# Patient Record
Sex: FEMALE | Race: OTHER | Employment: UNEMPLOYED | URBAN - METROPOLITAN AREA
[De-identification: names, ages, dates, MRNs, and addresses within clinical notes are randomized per-mention and may not be internally consistent; named-entity substitution may affect disease eponyms.]

---

## 2017-01-06 ENCOUNTER — CARE COORDINATION (OUTPATIENT)
Dept: CASE MANAGEMENT | Age: 72
End: 2017-01-06

## 2017-01-13 ENCOUNTER — CARE COORDINATION (OUTPATIENT)
Dept: CASE MANAGEMENT | Age: 72
End: 2017-01-13

## 2017-02-07 ENCOUNTER — HOSPITAL ENCOUNTER (OUTPATIENT)
Dept: CT IMAGING | Age: 72
Discharge: HOME | End: 2017-02-07
Payer: MEDICARE

## 2017-02-07 VITALS
SYSTOLIC BLOOD PRESSURE: 122 MMHG | WEIGHT: 233 LBS | BODY MASS INDEX: 35.31 KG/M2 | RESPIRATION RATE: 16 BRPM | HEART RATE: 68 BPM | HEIGHT: 68 IN | DIASTOLIC BLOOD PRESSURE: 73 MMHG

## 2017-02-07 DIAGNOSIS — C34.90 MALIGNANT NEOPLASM OF LUNG, UNSPECIFIED LATERALITY, UNSPECIFIED PART OF LUNG (HCC): ICD-10-CM

## 2017-02-07 PROCEDURE — 71275 CT ANGIOGRAPHY CHEST: CPT

## 2017-02-07 PROCEDURE — 6360000004 HC RX CONTRAST MEDICATION: Performed by: RADIOLOGY

## 2017-02-07 RX ADMIN — IOPAMIDOL 100 ML: 755 INJECTION, SOLUTION INTRAVENOUS at 12:35

## 2017-02-08 LAB
GFR AFRICAN AMERICAN: 59
GFR NON-AFRICAN AMERICAN: 49
PERFORMED ON: ABNORMAL
POC CREATININE: 1.1 MG/DL (ref 0.6–1.2)
POC SAMPLE TYPE: ABNORMAL

## 2017-04-21 ENCOUNTER — HOSPITAL ENCOUNTER (OUTPATIENT)
Dept: INFUSION THERAPY | Age: 72
Setting detail: INFUSION SERIES
Discharge: HOME | End: 2017-04-21
Payer: MEDICARE

## 2017-04-21 VITALS
SYSTOLIC BLOOD PRESSURE: 144 MMHG | RESPIRATION RATE: 18 BRPM | DIASTOLIC BLOOD PRESSURE: 67 MMHG | HEART RATE: 67 BPM | TEMPERATURE: 98.7 F

## 2017-04-21 PROCEDURE — 6360000002 HC RX W HCPCS: Performed by: INTERNAL MEDICINE

## 2017-04-21 PROCEDURE — 2580000003 HC RX 258: Performed by: INTERNAL MEDICINE

## 2017-04-21 PROCEDURE — 96413 CHEMO IV INFUSION 1 HR: CPT

## 2017-04-21 PROCEDURE — 96417 CHEMO IV INFUS EACH ADDL SEQ: CPT

## 2017-04-21 RX ORDER — SODIUM CHLORIDE 9 MG/ML
INJECTION, SOLUTION INTRAVENOUS CONTINUOUS
Status: DISCONTINUED | OUTPATIENT
Start: 2017-04-21 | End: 2017-04-22 | Stop reason: HOSPADM

## 2017-04-21 RX ORDER — SODIUM CHLORIDE 9 MG/ML
INJECTION, SOLUTION INTRAVENOUS
Status: DISCONTINUED
Start: 2017-04-21 | End: 2017-04-22 | Stop reason: HOSPADM

## 2017-04-21 RX ORDER — SODIUM CHLORIDE 0.9 % (FLUSH) 0.9 %
10 SYRINGE (ML) INJECTION 2 TIMES DAILY
Status: DISCONTINUED | OUTPATIENT
Start: 2017-04-21 | End: 2017-04-22 | Stop reason: HOSPADM

## 2017-04-21 RX ADMIN — SODIUM CHLORIDE: 9 INJECTION, SOLUTION INTRAVENOUS at 13:23

## 2017-04-21 RX ADMIN — BEVACIZUMAB: 400 INJECTION, SOLUTION INTRAVENOUS at 14:54

## 2017-04-27 ENCOUNTER — HOSPITAL ENCOUNTER (EMERGENCY)
Age: 72
Discharge: HOME | End: 2017-04-27
Payer: MEDICARE

## 2017-04-27 ENCOUNTER — APPOINTMENT (OUTPATIENT)
Dept: GENERAL RADIOLOGY | Age: 72
End: 2017-04-27
Payer: MEDICARE

## 2017-04-27 VITALS
DIASTOLIC BLOOD PRESSURE: 59 MMHG | BODY MASS INDEX: 36.83 KG/M2 | HEIGHT: 68 IN | OXYGEN SATURATION: 97 % | SYSTOLIC BLOOD PRESSURE: 121 MMHG | RESPIRATION RATE: 16 BRPM | WEIGHT: 243 LBS | HEART RATE: 69 BPM | TEMPERATURE: 97.4 F

## 2017-04-27 DIAGNOSIS — J02.9 ACUTE PHARYNGITIS, UNSPECIFIED ETIOLOGY: Primary | ICD-10-CM

## 2017-04-27 DIAGNOSIS — T50.905A MEDICATION REACTION, INITIAL ENCOUNTER: ICD-10-CM

## 2017-04-27 DIAGNOSIS — R73.9 HYPERGLYCEMIA: ICD-10-CM

## 2017-04-27 LAB
ALBUMIN SERPL-MCNC: 3.4 G/DL (ref 3.9–4.9)
ALP BLD-CCNC: 121 U/L (ref 40–130)
ALT SERPL-CCNC: 20 U/L (ref 0–33)
ANION GAP SERPL CALCULATED.3IONS-SCNC: 12 MEQ/L (ref 7–13)
APTT: 70.6 SEC (ref 21.6–35.4)
AST SERPL-CCNC: 24 U/L (ref 0–35)
BASOPHILS ABSOLUTE: 0 K/UL (ref 0–0.2)
BASOPHILS RELATIVE PERCENT: 0.4 %
BILIRUB SERPL-MCNC: 0.8 MG/DL (ref 0–1.2)
BILIRUBIN DIRECT: 0.2 MG/DL (ref 0–0.3)
BILIRUBIN, INDIRECT: 0.6 MG/DL (ref 0–0.6)
BUN BLDV-MCNC: 24 MG/DL (ref 8–23)
CALCIUM SERPL-MCNC: 9.1 MG/DL (ref 8.6–10.2)
CHLORIDE BLD-SCNC: 95 MEQ/L (ref 98–107)
CO2: 23 MEQ/L (ref 22–29)
CREAT SERPL-MCNC: 1.32 MG/DL (ref 0.5–0.9)
EOSINOPHILS ABSOLUTE: 0 K/UL (ref 0–0.7)
EOSINOPHILS RELATIVE PERCENT: 0.8 %
GFR AFRICAN AMERICAN: 48
GFR NON-AFRICAN AMERICAN: 39.7
GLUCOSE BLD-MCNC: 408 MG/DL (ref 74–109)
HCT VFR BLD CALC: 35.6 % (ref 37–47)
HEMOGLOBIN: 12.3 G/DL (ref 12–16)
INR BLD: 3
LYMPHOCYTES ABSOLUTE: 1.5 K/UL (ref 1–4.8)
LYMPHOCYTES RELATIVE PERCENT: 29.6 %
MCH RBC QN AUTO: 27.6 PG (ref 27–31.3)
MCHC RBC AUTO-ENTMCNC: 34.6 % (ref 33–37)
MCV RBC AUTO: 79.7 FL (ref 82–100)
MONOCYTES ABSOLUTE: 0.1 K/UL (ref 0.2–0.8)
MONOCYTES RELATIVE PERCENT: 2.5 %
NEUTROPHILS ABSOLUTE: 3.4 K/UL (ref 1.4–6.5)
NEUTROPHILS RELATIVE PERCENT: 66.7 %
PDW BLD-RTO: 15.4 % (ref 11.5–14.5)
PLATELET # BLD: 151 K/UL (ref 130–400)
POTASSIUM SERPL-SCNC: 4.2 MEQ/L (ref 3.5–5.1)
PROTHROMBIN TIME: 33.5 SEC (ref 8.1–13.7)
RBC # BLD: 4.47 M/UL (ref 4.2–5.4)
S PYO AG THROAT QL: NEGATIVE
SODIUM BLD-SCNC: 130 MEQ/L (ref 132–144)
TOTAL PROTEIN: 6.7 G/DL (ref 6.4–8.1)
WBC # BLD: 5.1 K/UL (ref 4.8–10.8)

## 2017-04-27 PROCEDURE — 6370000000 HC RX 637 (ALT 250 FOR IP): Performed by: PHYSICIAN ASSISTANT

## 2017-04-27 PROCEDURE — 99283 EMERGENCY DEPT VISIT LOW MDM: CPT

## 2017-04-27 PROCEDURE — 87081 CULTURE SCREEN ONLY: CPT

## 2017-04-27 PROCEDURE — 6360000002 HC RX W HCPCS: Performed by: PHYSICIAN ASSISTANT

## 2017-04-27 PROCEDURE — 96375 TX/PRO/DX INJ NEW DRUG ADDON: CPT

## 2017-04-27 PROCEDURE — 96372 THER/PROPH/DIAG INJ SC/IM: CPT

## 2017-04-27 PROCEDURE — 2580000003 HC RX 258: Performed by: PHYSICIAN ASSISTANT

## 2017-04-27 PROCEDURE — 85610 PROTHROMBIN TIME: CPT

## 2017-04-27 PROCEDURE — 36415 COLL VENOUS BLD VENIPUNCTURE: CPT

## 2017-04-27 PROCEDURE — 71010 XR CHEST PORTABLE: CPT

## 2017-04-27 PROCEDURE — 80076 HEPATIC FUNCTION PANEL: CPT

## 2017-04-27 PROCEDURE — 80048 BASIC METABOLIC PNL TOTAL CA: CPT

## 2017-04-27 PROCEDURE — 85025 COMPLETE CBC W/AUTO DIFF WBC: CPT

## 2017-04-27 PROCEDURE — 96374 THER/PROPH/DIAG INJ IV PUSH: CPT

## 2017-04-27 PROCEDURE — 87880 STREP A ASSAY W/OPTIC: CPT

## 2017-04-27 PROCEDURE — 85730 THROMBOPLASTIN TIME PARTIAL: CPT

## 2017-04-27 RX ORDER — 0.9 % SODIUM CHLORIDE 0.9 %
1000 INTRAVENOUS SOLUTION INTRAVENOUS ONCE
Status: COMPLETED | OUTPATIENT
Start: 2017-04-27 | End: 2017-04-27

## 2017-04-27 RX ORDER — HYDROCODONE BITARTRATE AND ACETAMINOPHEN 5; 217 MG/10ML; MG/10ML
10 SOLUTION ORAL EVERY 6 HOURS PRN
Qty: 120 ML | Refills: 0 | Status: SHIPPED | OUTPATIENT
Start: 2017-04-27 | End: 2017-04-30

## 2017-04-27 RX ORDER — MORPHINE SULFATE 4 MG/ML
4 INJECTION, SOLUTION INTRAMUSCULAR; INTRAVENOUS ONCE
Status: COMPLETED | OUTPATIENT
Start: 2017-04-27 | End: 2017-04-27

## 2017-04-27 RX ORDER — ONDANSETRON 2 MG/ML
4 INJECTION INTRAMUSCULAR; INTRAVENOUS ONCE
Status: COMPLETED | OUTPATIENT
Start: 2017-04-27 | End: 2017-04-27

## 2017-04-27 RX ADMIN — SODIUM CHLORIDE 1000 ML: 9 INJECTION, SOLUTION INTRAVENOUS at 15:43

## 2017-04-27 RX ADMIN — MORPHINE SULFATE 4 MG: 4 INJECTION, SOLUTION INTRAMUSCULAR; INTRAVENOUS at 15:47

## 2017-04-27 RX ADMIN — Medication 30 ML: at 14:38

## 2017-04-27 RX ADMIN — INSULIN HUMAN 8 UNITS: 100 INJECTION, SOLUTION PARENTERAL at 15:47

## 2017-04-27 RX ADMIN — ONDANSETRON 4 MG: 2 INJECTION, SOLUTION INTRAMUSCULAR; INTRAVENOUS at 15:45

## 2017-04-27 ASSESSMENT — PAIN SCALES - GENERAL
PAINLEVEL_OUTOF10: 0
PAINLEVEL_OUTOF10: 7
PAINLEVEL_OUTOF10: 2

## 2017-04-27 ASSESSMENT — PAIN DESCRIPTION - LOCATION: LOCATION: THROAT

## 2017-04-28 LAB — S PYO THROAT QL CULT: NORMAL

## 2017-05-12 ENCOUNTER — HOSPITAL ENCOUNTER (OUTPATIENT)
Dept: INFUSION THERAPY | Age: 72
Setting detail: INFUSION SERIES
Discharge: HOME | End: 2017-05-12
Payer: MEDICARE

## 2017-05-12 VITALS
RESPIRATION RATE: 16 BRPM | DIASTOLIC BLOOD PRESSURE: 70 MMHG | WEIGHT: 235 LBS | BODY MASS INDEX: 35.73 KG/M2 | TEMPERATURE: 97.8 F | HEART RATE: 74 BPM | SYSTOLIC BLOOD PRESSURE: 141 MMHG

## 2017-05-12 PROCEDURE — 96413 CHEMO IV INFUSION 1 HR: CPT

## 2017-05-12 PROCEDURE — 2580000003 HC RX 258

## 2017-05-12 PROCEDURE — 96375 TX/PRO/DX INJ NEW DRUG ADDON: CPT

## 2017-05-12 PROCEDURE — 2580000003 HC RX 258: Performed by: INTERNAL MEDICINE

## 2017-05-12 PROCEDURE — 6360000002 HC RX W HCPCS: Performed by: INTERNAL MEDICINE

## 2017-05-12 PROCEDURE — 96411 CHEMO IV PUSH ADDL DRUG: CPT

## 2017-05-12 PROCEDURE — 96417 CHEMO IV INFUS EACH ADDL SEQ: CPT

## 2017-05-12 RX ORDER — SODIUM CHLORIDE 9 MG/ML
INJECTION, SOLUTION INTRAVENOUS
Status: COMPLETED
Start: 2017-05-12 | End: 2017-05-12

## 2017-05-12 RX ADMIN — SODIUM CHLORIDE: 9 INJECTION, SOLUTION INTRAVENOUS at 14:00

## 2017-05-12 RX ADMIN — SODIUM CHLORIDE 250 ML: 9 INJECTION, SOLUTION INTRAVENOUS at 12:45

## 2017-05-12 RX ADMIN — DEXAMETHASONE SODIUM PHOSPHATE: 4 INJECTION, SOLUTION INTRA-ARTICULAR; INTRALESIONAL; INTRAMUSCULAR; INTRAVENOUS; SOFT TISSUE at 13:40

## 2017-05-12 RX ADMIN — BEVACIZUMAB 765 MG: 400 INJECTION, SOLUTION INTRAVENOUS at 12:50

## 2017-05-20 ENCOUNTER — HOSPITAL ENCOUNTER (INPATIENT)
Age: 72
LOS: 2 days | Discharge: HOME | DRG: 813 | End: 2017-05-23
Attending: EMERGENCY MEDICINE | Admitting: HOSPITALIST
Payer: MEDICARE

## 2017-05-20 DIAGNOSIS — R73.9 HYPERGLYCEMIA: Primary | ICD-10-CM

## 2017-05-20 DIAGNOSIS — K92.2 LOWER GI BLEED: ICD-10-CM

## 2017-05-20 LAB
ALBUMIN SERPL-MCNC: 3.3 G/DL (ref 3.9–4.9)
ALP BLD-CCNC: 125 U/L (ref 40–130)
ALT SERPL-CCNC: 17 U/L (ref 0–33)
ANION GAP SERPL CALCULATED.3IONS-SCNC: 13 MEQ/L (ref 7–13)
APTT: 61.8 SEC (ref 21.6–35.4)
AST SERPL-CCNC: 22 U/L (ref 0–35)
BILIRUB SERPL-MCNC: 0.4 MG/DL (ref 0–1.2)
BUN BLDV-MCNC: 16 MG/DL (ref 8–23)
CALCIUM SERPL-MCNC: 8.1 MG/DL (ref 8.6–10.2)
CHLORIDE BLD-SCNC: 91 MEQ/L (ref 98–107)
CK MB: 2.6 NG/ML (ref 0–3.8)
CO2: 20 MEQ/L (ref 22–29)
CREAT SERPL-MCNC: 1.07 MG/DL (ref 0.5–0.9)
CREATINE KINASE-MB INDEX: 4.1 % (ref 0–3.5)
GFR AFRICAN AMERICAN: >60
GFR NON-AFRICAN AMERICAN: 50.5
GLOBULIN: 2.9 G/DL (ref 2.3–3.5)
GLUCOSE BLD-MCNC: 652 MG/DL (ref 74–109)
INR BLD: 2.1
POTASSIUM SERPL-SCNC: 4.2 MEQ/L (ref 3.5–5.1)
PROTHROMBIN TIME: 22.7 SEC (ref 8.1–13.7)
SODIUM BLD-SCNC: 124 MEQ/L (ref 132–144)
TOTAL CK: 64 U/L (ref 0–170)
TOTAL PROTEIN: 6.2 G/DL (ref 6.4–8.1)
TROPONIN: <0.01 NG/ML (ref 0–0.01)

## 2017-05-20 PROCEDURE — 84484 ASSAY OF TROPONIN QUANT: CPT

## 2017-05-20 PROCEDURE — 85025 COMPLETE CBC W/AUTO DIFF WBC: CPT

## 2017-05-20 PROCEDURE — 82550 ASSAY OF CK (CPK): CPT

## 2017-05-20 PROCEDURE — 80053 COMPREHEN METABOLIC PANEL: CPT

## 2017-05-20 PROCEDURE — 99285 EMERGENCY DEPT VISIT HI MDM: CPT

## 2017-05-20 PROCEDURE — 82553 CREATINE MB FRACTION: CPT

## 2017-05-20 PROCEDURE — 96374 THER/PROPH/DIAG INJ IV PUSH: CPT

## 2017-05-20 PROCEDURE — 93005 ELECTROCARDIOGRAM TRACING: CPT

## 2017-05-20 PROCEDURE — 85730 THROMBOPLASTIN TIME PARTIAL: CPT

## 2017-05-20 PROCEDURE — 36415 COLL VENOUS BLD VENIPUNCTURE: CPT

## 2017-05-20 PROCEDURE — 85610 PROTHROMBIN TIME: CPT

## 2017-05-20 PROCEDURE — 96375 TX/PRO/DX INJ NEW DRUG ADDON: CPT

## 2017-05-20 ASSESSMENT — PAIN DESCRIPTION - PAIN TYPE: TYPE: ACUTE PAIN

## 2017-05-20 ASSESSMENT — PAIN DESCRIPTION - FREQUENCY: FREQUENCY: CONTINUOUS

## 2017-05-20 ASSESSMENT — PAIN SCALES - GENERAL: PAINLEVEL_OUTOF10: 8

## 2017-05-20 ASSESSMENT — PAIN DESCRIPTION - DESCRIPTORS: DESCRIPTORS: BURNING

## 2017-05-20 ASSESSMENT — PAIN DESCRIPTION - LOCATION: LOCATION: CHEST

## 2017-05-21 PROBLEM — E87.1 HYPONATREMIA: Status: ACTIVE | Noted: 2017-05-21

## 2017-05-21 PROBLEM — D72.819 LEUKOPENIA: Status: ACTIVE | Noted: 2017-05-21

## 2017-05-21 PROBLEM — K92.2 GI BLEED: Status: ACTIVE | Noted: 2017-05-21

## 2017-05-21 PROBLEM — R73.9 HYPERGLYCEMIA: Status: ACTIVE | Noted: 2017-05-21

## 2017-05-21 LAB
ANION GAP SERPL CALCULATED.3IONS-SCNC: 13 MEQ/L (ref 7–13)
ANISOCYTOSIS: ABNORMAL
BACTERIA: ABNORMAL /HPF
BASOPHILS ABSOLUTE: 0 K/UL (ref 0–0.2)
BASOPHILS ABSOLUTE: 0 K/UL (ref 0–0.2)
BASOPHILS RELATIVE PERCENT: 0.5 %
BASOPHILS RELATIVE PERCENT: 1 %
BILIRUBIN URINE: NEGATIVE
BLOOD, URINE: ABNORMAL
BUN BLDV-MCNC: 13 MG/DL (ref 8–23)
C-REACTIVE PROTEIN, HIGH SENSITIVITY: 34.3 MG/L (ref 0–5)
CALCIUM SERPL-MCNC: 7.9 MG/DL (ref 8.6–10.2)
CHLORIDE BLD-SCNC: 101 MEQ/L (ref 98–107)
CK MB: 1.7 NG/ML (ref 0–3.8)
CK MB: 1.8 NG/ML (ref 0–3.8)
CLARITY: ABNORMAL
CO2: 20 MEQ/L (ref 22–29)
COLOR: YELLOW
CREAT SERPL-MCNC: 0.89 MG/DL (ref 0.5–0.9)
CREATINE KINASE-MB INDEX: 3.1 % (ref 0–3.5)
CREATINE KINASE-MB INDEX: 3.2 % (ref 0–3.5)
EOSINOPHILS ABSOLUTE: 0 K/UL (ref 0–0.7)
EOSINOPHILS ABSOLUTE: 0 K/UL (ref 0–0.7)
EOSINOPHILS RELATIVE PERCENT: 1.3 %
EOSINOPHILS RELATIVE PERCENT: 1.3 %
EPITHELIAL CELLS, UA: ABNORMAL /HPF
GFR AFRICAN AMERICAN: >60
GFR NON-AFRICAN AMERICAN: >60
GLUCOSE BLD-MCNC: 137 MG/DL (ref 60–115)
GLUCOSE BLD-MCNC: 212 MG/DL (ref 60–115)
GLUCOSE BLD-MCNC: 229 MG/DL (ref 60–115)
GLUCOSE BLD-MCNC: 254 MG/DL (ref 60–115)
GLUCOSE BLD-MCNC: 290 MG/DL (ref 74–109)
GLUCOSE BLD-MCNC: 421 MG/DL (ref 60–115)
GLUCOSE URINE: >=1000 MG/DL
HBA1C MFR BLD: 11.4 % (ref 4.8–5.9)
HCT VFR BLD CALC: 29.6 % (ref 37–47)
HCT VFR BLD CALC: 33.8 % (ref 37–47)
HEMOGLOBIN: 10 G/DL (ref 12–16)
HEMOGLOBIN: 11 G/DL (ref 12–16)
HYPOCHROMIA: 0
INR BLD: 2.5
KETONES, URINE: NEGATIVE MG/DL
LEUKOCYTE ESTERASE, URINE: NEGATIVE
LYMPHOCYTES ABSOLUTE: 1.1 K/UL (ref 1–4.8)
LYMPHOCYTES ABSOLUTE: 2.1 K/UL (ref 1–4.8)
LYMPHOCYTES RELATIVE PERCENT: 36.8 %
LYMPHOCYTES RELATIVE PERCENT: 58 %
MACROCYTES: 0
MAGNESIUM: 1.8 MG/DL (ref 1.7–2.3)
MCH RBC QN AUTO: 27.6 PG (ref 27–31.3)
MCH RBC QN AUTO: 27.7 PG (ref 27–31.3)
MCHC RBC AUTO-ENTMCNC: 32.5 % (ref 33–37)
MCHC RBC AUTO-ENTMCNC: 33.8 % (ref 33–37)
MCV RBC AUTO: 82.2 FL (ref 82–100)
MCV RBC AUTO: 84.9 FL (ref 82–100)
MICROCYTES: ABNORMAL
MONOCYTES ABSOLUTE: 0.5 K/UL (ref 0.2–0.8)
MONOCYTES ABSOLUTE: 0.8 K/UL (ref 0.2–0.8)
MONOCYTES RELATIVE PERCENT: 13.4 %
MONOCYTES RELATIVE PERCENT: 25.2 %
MYELOCYTE PERCENT: 1 %
NEUTROPHILS ABSOLUTE: 1 K/UL (ref 1.4–6.5)
NEUTROPHILS ABSOLUTE: 1.1 K/UL (ref 1.4–6.5)
NEUTROPHILS RELATIVE PERCENT: 27 %
NEUTROPHILS RELATIVE PERCENT: 36.2 %
NITRITE, URINE: NEGATIVE
NUCLEATED RED BLOOD CELLS: 1 /100 WBC
PDW BLD-RTO: 16.4 % (ref 11.5–14.5)
PDW BLD-RTO: 16.7 % (ref 11.5–14.5)
PERFORMED ON: ABNORMAL
PH UA: 5 (ref 5–9)
PLATELET # BLD: 131 K/UL (ref 130–400)
PLATELET # BLD: 143 K/UL (ref 130–400)
PLATELET SLIDE REVIEW: NORMAL
POIKILOCYTES: 0
POLYCHROMASIA: 0
POTASSIUM SERPL-SCNC: 4.1 MEQ/L (ref 3.5–5.1)
PROTEIN UA: 100 MG/DL
PROTHROMBIN TIME: 27.5 SEC (ref 8.1–13.7)
RBC # BLD: 3.61 M/UL (ref 4.2–5.4)
RBC # BLD: 3.97 M/UL (ref 4.2–5.4)
RBC UA: ABNORMAL /HPF (ref 0–2)
SEDIMENTATION RATE, ERYTHROCYTE: 114 MM (ref 0–30)
SLIDE REVIEW: ABNORMAL
SODIUM BLD-SCNC: 134 MEQ/L (ref 132–144)
SPECIFIC GRAVITY UA: 1.02 (ref 1–1.03)
TOTAL CK: 55 U/L (ref 0–170)
TOTAL CK: 57 U/L (ref 0–170)
TROPONIN: <0.01 NG/ML (ref 0–0.01)
TROPONIN: <0.01 NG/ML (ref 0–0.01)
UROBILINOGEN, URINE: 0.2 E.U./DL
WBC # BLD: 3 K/UL (ref 4.8–10.8)
WBC # BLD: 3.7 K/UL (ref 4.8–10.8)
WBC UA: ABNORMAL /HPF (ref 0–5)

## 2017-05-21 PROCEDURE — 84484 ASSAY OF TROPONIN QUANT: CPT

## 2017-05-21 PROCEDURE — 6360000002 HC RX W HCPCS: Performed by: INTERNAL MEDICINE

## 2017-05-21 PROCEDURE — 82553 CREATINE MB FRACTION: CPT

## 2017-05-21 PROCEDURE — 36415 COLL VENOUS BLD VENIPUNCTURE: CPT

## 2017-05-21 PROCEDURE — 2580000003 HC RX 258: Performed by: EMERGENCY MEDICINE

## 2017-05-21 PROCEDURE — 81001 URINALYSIS AUTO W/SCOPE: CPT

## 2017-05-21 PROCEDURE — G8978 MOBILITY CURRENT STATUS: HCPCS

## 2017-05-21 PROCEDURE — 1210000000 HC MED SURG R&B

## 2017-05-21 PROCEDURE — 85610 PROTHROMBIN TIME: CPT

## 2017-05-21 PROCEDURE — 97162 PT EVAL MOD COMPLEX 30 MIN: CPT

## 2017-05-21 PROCEDURE — 86141 C-REACTIVE PROTEIN HS: CPT

## 2017-05-21 PROCEDURE — 80048 BASIC METABOLIC PNL TOTAL CA: CPT

## 2017-05-21 PROCEDURE — G8979 MOBILITY GOAL STATUS: HCPCS

## 2017-05-21 PROCEDURE — 82550 ASSAY OF CK (CPK): CPT

## 2017-05-21 PROCEDURE — 6360000002 HC RX W HCPCS: Performed by: EMERGENCY MEDICINE

## 2017-05-21 PROCEDURE — 6370000000 HC RX 637 (ALT 250 FOR IP): Performed by: HOSPITALIST

## 2017-05-21 PROCEDURE — 85652 RBC SED RATE AUTOMATED: CPT

## 2017-05-21 PROCEDURE — 2580000003 HC RX 258: Performed by: HOSPITALIST

## 2017-05-21 PROCEDURE — 85025 COMPLETE CBC W/AUTO DIFF WBC: CPT

## 2017-05-21 PROCEDURE — 6370000000 HC RX 637 (ALT 250 FOR IP): Performed by: INTERNAL MEDICINE

## 2017-05-21 PROCEDURE — 94664 DEMO&/EVAL PT USE INHALER: CPT

## 2017-05-21 PROCEDURE — 83735 ASSAY OF MAGNESIUM: CPT

## 2017-05-21 PROCEDURE — 83036 HEMOGLOBIN GLYCOSYLATED A1C: CPT

## 2017-05-21 PROCEDURE — 6370000000 HC RX 637 (ALT 250 FOR IP): Performed by: EMERGENCY MEDICINE

## 2017-05-21 RX ORDER — WARFARIN SODIUM 2.5 MG/1
2.5 TABLET ORAL
Status: DISCONTINUED | OUTPATIENT
Start: 2017-05-21 | End: 2017-05-21

## 2017-05-21 RX ORDER — SODIUM CHLORIDE 9 MG/ML
INJECTION, SOLUTION INTRAVENOUS CONTINUOUS
Status: DISCONTINUED | OUTPATIENT
Start: 2017-05-21 | End: 2017-05-23 | Stop reason: HOSPADM

## 2017-05-21 RX ORDER — DOCUSATE SODIUM 100 MG/1
100 CAPSULE, LIQUID FILLED ORAL 2 TIMES DAILY
Status: DISCONTINUED | OUTPATIENT
Start: 2017-05-21 | End: 2017-05-23 | Stop reason: HOSPADM

## 2017-05-21 RX ORDER — PRAVASTATIN SODIUM 40 MG
80 TABLET ORAL DAILY
Status: DISCONTINUED | OUTPATIENT
Start: 2017-05-21 | End: 2017-05-23 | Stop reason: HOSPADM

## 2017-05-21 RX ORDER — LEVOTHYROXINE SODIUM 0.15 MG/1
150 TABLET ORAL DAILY
Status: DISCONTINUED | OUTPATIENT
Start: 2017-05-21 | End: 2017-05-23 | Stop reason: HOSPADM

## 2017-05-21 RX ORDER — ONDANSETRON 2 MG/ML
4 INJECTION INTRAMUSCULAR; INTRAVENOUS EVERY 6 HOURS PRN
Status: DISCONTINUED | OUTPATIENT
Start: 2017-05-21 | End: 2017-05-23 | Stop reason: HOSPADM

## 2017-05-21 RX ORDER — SODIUM CHLORIDE 0.9 % (FLUSH) 0.9 %
10 SYRINGE (ML) INJECTION EVERY 12 HOURS SCHEDULED
Status: DISCONTINUED | OUTPATIENT
Start: 2017-05-21 | End: 2017-05-23 | Stop reason: HOSPADM

## 2017-05-21 RX ORDER — FOLIC ACID 1 MG/1
1 TABLET ORAL DAILY
Status: DISCONTINUED | OUTPATIENT
Start: 2017-05-21 | End: 2017-05-23 | Stop reason: HOSPADM

## 2017-05-21 RX ORDER — ONDANSETRON 2 MG/ML
4 INJECTION INTRAMUSCULAR; INTRAVENOUS ONCE
Status: COMPLETED | OUTPATIENT
Start: 2017-05-21 | End: 2017-05-21

## 2017-05-21 RX ORDER — DEXTROSE MONOHYDRATE 25 G/50ML
12.5 INJECTION, SOLUTION INTRAVENOUS PRN
Status: DISCONTINUED | OUTPATIENT
Start: 2017-05-21 | End: 2017-05-23 | Stop reason: HOSPADM

## 2017-05-21 RX ORDER — FAMOTIDINE 20 MG/1
40 TABLET, FILM COATED ORAL DAILY
Status: DISCONTINUED | OUTPATIENT
Start: 2017-05-21 | End: 2017-05-23 | Stop reason: HOSPADM

## 2017-05-21 RX ORDER — NITROGLYCERIN 0.4 MG/1
0.4 TABLET SUBLINGUAL EVERY 5 MIN PRN
Status: DISCONTINUED | OUTPATIENT
Start: 2017-05-21 | End: 2017-05-23 | Stop reason: HOSPADM

## 2017-05-21 RX ORDER — DEXTROSE MONOHYDRATE 50 MG/ML
100 INJECTION, SOLUTION INTRAVENOUS PRN
Status: DISCONTINUED | OUTPATIENT
Start: 2017-05-21 | End: 2017-05-23 | Stop reason: HOSPADM

## 2017-05-21 RX ORDER — DOXEPIN HYDROCHLORIDE 50 MG/1
200 CAPSULE ORAL NIGHTLY
Status: DISCONTINUED | OUTPATIENT
Start: 2017-05-21 | End: 2017-05-23 | Stop reason: HOSPADM

## 2017-05-21 RX ORDER — ASPIRIN 81 MG/1
81 TABLET ORAL DAILY
Status: DISCONTINUED | OUTPATIENT
Start: 2017-05-21 | End: 2017-05-21

## 2017-05-21 RX ORDER — LORAZEPAM 2 MG/ML
1 INJECTION INTRAMUSCULAR ONCE
Status: COMPLETED | OUTPATIENT
Start: 2017-05-21 | End: 2017-05-21

## 2017-05-21 RX ORDER — WARFARIN SODIUM 5 MG/1
5 TABLET ORAL
Status: DISCONTINUED | OUTPATIENT
Start: 2017-05-24 | End: 2017-05-21

## 2017-05-21 RX ORDER — HYDROCHLOROTHIAZIDE 25 MG/1
25 TABLET ORAL
Status: DISCONTINUED | OUTPATIENT
Start: 2017-05-22 | End: 2017-05-23 | Stop reason: HOSPADM

## 2017-05-21 RX ORDER — ALBUTEROL SULFATE 90 UG/1
2 AEROSOL, METERED RESPIRATORY (INHALATION) EVERY 6 HOURS PRN
Status: DISCONTINUED | OUTPATIENT
Start: 2017-05-21 | End: 2017-05-23 | Stop reason: HOSPADM

## 2017-05-21 RX ORDER — PRIMIDONE 50 MG/1
50 TABLET ORAL NIGHTLY
Status: DISCONTINUED | OUTPATIENT
Start: 2017-05-21 | End: 2017-05-23 | Stop reason: HOSPADM

## 2017-05-21 RX ORDER — SODIUM CHLORIDE 0.9 % (FLUSH) 0.9 %
10 SYRINGE (ML) INJECTION PRN
Status: DISCONTINUED | OUTPATIENT
Start: 2017-05-21 | End: 2017-05-23 | Stop reason: HOSPADM

## 2017-05-21 RX ORDER — ACETAMINOPHEN 325 MG/1
650 TABLET ORAL EVERY 4 HOURS PRN
Status: DISCONTINUED | OUTPATIENT
Start: 2017-05-21 | End: 2017-05-23 | Stop reason: HOSPADM

## 2017-05-21 RX ORDER — PANTOPRAZOLE SODIUM 40 MG/1
40 TABLET, DELAYED RELEASE ORAL
Status: DISCONTINUED | OUTPATIENT
Start: 2017-05-21 | End: 2017-05-23 | Stop reason: HOSPADM

## 2017-05-21 RX ORDER — 0.9 % SODIUM CHLORIDE 0.9 %
1000 INTRAVENOUS SOLUTION INTRAVENOUS ONCE
Status: COMPLETED | OUTPATIENT
Start: 2017-05-21 | End: 2017-05-21

## 2017-05-21 RX ORDER — MAGNESIUM HYDROXIDE/ALUMINUM HYDROXICE/SIMETHICONE 120; 1200; 1200 MG/30ML; MG/30ML; MG/30ML
30 SUSPENSION ORAL
Status: COMPLETED | OUTPATIENT
Start: 2017-05-21 | End: 2017-05-21

## 2017-05-21 RX ORDER — KETOROLAC TROMETHAMINE 30 MG/ML
30 INJECTION, SOLUTION INTRAMUSCULAR; INTRAVENOUS EVERY 8 HOURS PRN
Status: DISCONTINUED | OUTPATIENT
Start: 2017-05-21 | End: 2017-05-23 | Stop reason: HOSPADM

## 2017-05-21 RX ORDER — ISOSORBIDE MONONITRATE 30 MG/1
30 TABLET, EXTENDED RELEASE ORAL DAILY
Status: DISCONTINUED | OUTPATIENT
Start: 2017-05-21 | End: 2017-05-23 | Stop reason: HOSPADM

## 2017-05-21 RX ORDER — NICOTINE POLACRILEX 4 MG
15 LOZENGE BUCCAL PRN
Status: DISCONTINUED | OUTPATIENT
Start: 2017-05-21 | End: 2017-05-23 | Stop reason: HOSPADM

## 2017-05-21 RX ORDER — PROPRANOLOL HCL 60 MG
60 CAPSULE, EXTENDED RELEASE 24HR ORAL DAILY
Status: DISCONTINUED | OUTPATIENT
Start: 2017-05-21 | End: 2017-05-23 | Stop reason: HOSPADM

## 2017-05-21 RX ADMIN — LEVOTHYROXINE SODIUM 150 MCG: 150 TABLET ORAL at 06:28

## 2017-05-21 RX ADMIN — INSULIN HUMAN 90 UNITS: 100 INJECTION, SUSPENSION SUBCUTANEOUS at 09:17

## 2017-05-21 RX ADMIN — PRIMIDONE 50 MG: 50 TABLET ORAL at 22:33

## 2017-05-21 RX ADMIN — Medication 5 ML: at 00:31

## 2017-05-21 RX ADMIN — DOXEPIN HYDROCHLORIDE 200 MG: 50 CAPSULE ORAL at 23:37

## 2017-05-21 RX ADMIN — PRAVASTATIN SODIUM 80 MG: 40 TABLET ORAL at 09:16

## 2017-05-21 RX ADMIN — FAMOTIDINE 40 MG: 20 TABLET ORAL at 18:08

## 2017-05-21 RX ADMIN — ISOSORBIDE MONONITRATE 30 MG: 30 TABLET, EXTENDED RELEASE ORAL at 09:17

## 2017-05-21 RX ADMIN — ALUMINUM HYDROXIDE, MAGNESIUM HYDROXIDE, AND SIMETHICONE 30 ML: 200; 200; 20 SUSPENSION ORAL at 00:31

## 2017-05-21 RX ADMIN — SODIUM CHLORIDE: 900 INJECTION, SOLUTION INTRAVENOUS at 12:15

## 2017-05-21 RX ADMIN — SODIUM CHLORIDE 1000 ML: 900 INJECTION, SOLUTION INTRAVENOUS at 22:37

## 2017-05-21 RX ADMIN — SODIUM CHLORIDE 1000 ML: 9 INJECTION, SOLUTION INTRAVENOUS at 00:33

## 2017-05-21 RX ADMIN — PANTOPRAZOLE SODIUM 40 MG: 40 TABLET, DELAYED RELEASE ORAL at 12:15

## 2017-05-21 RX ADMIN — INSULIN HUMAN 10 UNITS: 100 INJECTION, SOLUTION PARENTERAL at 00:34

## 2017-05-21 RX ADMIN — Medication 800 MG: at 10:48

## 2017-05-21 RX ADMIN — INSULIN LISPRO 6 UNITS: 100 INJECTION, SOLUTION INTRAVENOUS; SUBCUTANEOUS at 09:18

## 2017-05-21 RX ADMIN — KETOROLAC TROMETHAMINE 30 MG: 30 INJECTION, SOLUTION INTRAMUSCULAR at 18:06

## 2017-05-21 RX ADMIN — ACETAMINOPHEN 650 MG: 325 TABLET ORAL at 17:25

## 2017-05-21 RX ADMIN — DOCUSATE SODIUM 100 MG: 100 CAPSULE ORAL at 22:33

## 2017-05-21 RX ADMIN — FOLIC ACID 1 MG: 1 TABLET ORAL at 09:17

## 2017-05-21 RX ADMIN — ONDANSETRON 4 MG: 2 INJECTION, SOLUTION INTRAMUSCULAR; INTRAVENOUS at 00:33

## 2017-05-21 RX ADMIN — LORAZEPAM 1 MG: 2 INJECTION INTRAMUSCULAR; INTRAVENOUS at 01:41

## 2017-05-21 RX ADMIN — DOCUSATE SODIUM 100 MG: 100 CAPSULE ORAL at 09:17

## 2017-05-21 RX ADMIN — SODIUM CHLORIDE: 900 INJECTION, SOLUTION INTRAVENOUS at 03:22

## 2017-05-21 RX ADMIN — INSULIN LISPRO 4 UNITS: 100 INJECTION, SOLUTION INTRAVENOUS; SUBCUTANEOUS at 12:15

## 2017-05-21 RX ADMIN — INSULIN HUMAN 90 UNITS: 100 INJECTION, SUSPENSION SUBCUTANEOUS at 17:25

## 2017-05-21 RX ADMIN — INSULIN LISPRO 4 UNITS: 100 INJECTION, SOLUTION INTRAVENOUS; SUBCUTANEOUS at 17:25

## 2017-05-21 ASSESSMENT — PAIN SCALES - GENERAL
PAINLEVEL_OUTOF10: 6
PAINLEVEL_OUTOF10: 0
PAINLEVEL_OUTOF10: 9

## 2017-05-21 ASSESSMENT — ENCOUNTER SYMPTOMS
ANAL BLEEDING: 1
BLOOD IN STOOL: 1

## 2017-05-22 LAB
ANION GAP SERPL CALCULATED.3IONS-SCNC: 13 MEQ/L (ref 7–13)
BASOPHILS ABSOLUTE: 0 K/UL (ref 0–0.2)
BASOPHILS RELATIVE PERCENT: 0.1 %
BUN BLDV-MCNC: 15 MG/DL (ref 8–23)
CALCIUM SERPL-MCNC: 7.9 MG/DL (ref 8.6–10.2)
CHLORIDE BLD-SCNC: 106 MEQ/L (ref 98–107)
CO2: 19 MEQ/L (ref 22–29)
CREAT SERPL-MCNC: 1.24 MG/DL (ref 0.5–0.9)
EOSINOPHILS ABSOLUTE: 0 K/UL (ref 0–0.7)
EOSINOPHILS RELATIVE PERCENT: 0.9 %
GFR AFRICAN AMERICAN: 51.6
GFR NON-AFRICAN AMERICAN: 42.6
GLUCOSE BLD-MCNC: 188 MG/DL (ref 60–115)
GLUCOSE BLD-MCNC: 215 MG/DL (ref 60–115)
GLUCOSE BLD-MCNC: 234 MG/DL (ref 60–115)
GLUCOSE BLD-MCNC: 56 MG/DL (ref 74–109)
GLUCOSE BLD-MCNC: 65 MG/DL (ref 60–115)
HCT VFR BLD CALC: 28.6 % (ref 37–47)
HEMOGLOBIN: 9.8 G/DL (ref 12–16)
INR BLD: 2.4
LYMPHOCYTES ABSOLUTE: 1.6 K/UL (ref 1–4.8)
LYMPHOCYTES RELATIVE PERCENT: 44 %
MAGNESIUM: 1.9 MG/DL (ref 1.7–2.3)
MCH RBC QN AUTO: 28 PG (ref 27–31.3)
MCHC RBC AUTO-ENTMCNC: 34.1 % (ref 33–37)
MCV RBC AUTO: 81.9 FL (ref 82–100)
MONOCYTES ABSOLUTE: 0.9 K/UL (ref 0.2–0.8)
MONOCYTES RELATIVE PERCENT: 26.4 %
NEUTROPHILS ABSOLUTE: 1 K/UL (ref 1.4–6.5)
NEUTROPHILS RELATIVE PERCENT: 28.6 %
PDW BLD-RTO: 16.7 % (ref 11.5–14.5)
PERFORMED ON: ABNORMAL
PERFORMED ON: NORMAL
PLATELET # BLD: 136 K/UL (ref 130–400)
POTASSIUM SERPL-SCNC: 3.8 MEQ/L (ref 3.5–5.1)
PROTHROMBIN TIME: 26.8 SEC (ref 8.1–13.7)
RBC # BLD: 3.49 M/UL (ref 4.2–5.4)
SLIDE REVIEW: ABNORMAL
SODIUM BLD-SCNC: 138 MEQ/L (ref 132–144)
T4 FREE: 1.3 NG/DL (ref 0.93–1.7)
TSH SERPL DL<=0.05 MIU/L-ACNC: 2.66 UIU/ML (ref 0.27–4.2)
WBC # BLD: 3.6 K/UL (ref 4.8–10.8)

## 2017-05-22 PROCEDURE — 84439 ASSAY OF FREE THYROXINE: CPT

## 2017-05-22 PROCEDURE — 85610 PROTHROMBIN TIME: CPT

## 2017-05-22 PROCEDURE — 6370000000 HC RX 637 (ALT 250 FOR IP): Performed by: INTERNAL MEDICINE

## 2017-05-22 PROCEDURE — 6370000000 HC RX 637 (ALT 250 FOR IP): Performed by: HOSPITALIST

## 2017-05-22 PROCEDURE — 1210000000 HC MED SURG R&B

## 2017-05-22 PROCEDURE — 80048 BASIC METABOLIC PNL TOTAL CA: CPT

## 2017-05-22 PROCEDURE — 2580000003 HC RX 258: Performed by: HOSPITALIST

## 2017-05-22 PROCEDURE — 84443 ASSAY THYROID STIM HORMONE: CPT

## 2017-05-22 PROCEDURE — 99222 1ST HOSP IP/OBS MODERATE 55: CPT | Performed by: INTERNAL MEDICINE

## 2017-05-22 PROCEDURE — 85025 COMPLETE CBC W/AUTO DIFF WBC: CPT

## 2017-05-22 PROCEDURE — 36415 COLL VENOUS BLD VENIPUNCTURE: CPT

## 2017-05-22 PROCEDURE — 6360000002 HC RX W HCPCS: Performed by: INTERNAL MEDICINE

## 2017-05-22 PROCEDURE — 83735 ASSAY OF MAGNESIUM: CPT

## 2017-05-22 PROCEDURE — 97116 GAIT TRAINING THERAPY: CPT

## 2017-05-22 RX ORDER — POLYETHYLENE GLYCOL 3350 17 G/17G
17 POWDER, FOR SOLUTION ORAL DAILY
Status: DISCONTINUED | OUTPATIENT
Start: 2017-05-22 | End: 2017-05-23 | Stop reason: HOSPADM

## 2017-05-22 RX ADMIN — HYDROCHLOROTHIAZIDE 25 MG: 25 TABLET ORAL at 08:44

## 2017-05-22 RX ADMIN — PANTOPRAZOLE SODIUM 40 MG: 40 TABLET, DELAYED RELEASE ORAL at 06:51

## 2017-05-22 RX ADMIN — PRAVASTATIN SODIUM 80 MG: 40 TABLET ORAL at 08:44

## 2017-05-22 RX ADMIN — INSULIN LISPRO 2 UNITS: 100 INJECTION, SOLUTION INTRAVENOUS; SUBCUTANEOUS at 12:25

## 2017-05-22 RX ADMIN — INSULIN LISPRO 2 UNITS: 100 INJECTION, SOLUTION INTRAVENOUS; SUBCUTANEOUS at 23:23

## 2017-05-22 RX ADMIN — PRIMIDONE 50 MG: 50 TABLET ORAL at 21:11

## 2017-05-22 RX ADMIN — SODIUM CHLORIDE: 900 INJECTION, SOLUTION INTRAVENOUS at 15:30

## 2017-05-22 RX ADMIN — PROPRANOLOL HYDROCHLORIDE 60 MG: 60 CAPSULE, EXTENDED RELEASE ORAL at 08:44

## 2017-05-22 RX ADMIN — SODIUM CHLORIDE: 900 INJECTION, SOLUTION INTRAVENOUS at 08:47

## 2017-05-22 RX ADMIN — SODIUM CHLORIDE: 900 INJECTION, SOLUTION INTRAVENOUS at 10:28

## 2017-05-22 RX ADMIN — INSULIN LISPRO 4 UNITS: 100 INJECTION, SOLUTION INTRAVENOUS; SUBCUTANEOUS at 18:27

## 2017-05-22 RX ADMIN — DOXEPIN HYDROCHLORIDE 200 MG: 50 CAPSULE ORAL at 21:11

## 2017-05-22 RX ADMIN — KETOROLAC TROMETHAMINE 30 MG: 30 INJECTION, SOLUTION INTRAMUSCULAR at 21:35

## 2017-05-22 RX ADMIN — ACETAMINOPHEN 650 MG: 325 TABLET ORAL at 21:11

## 2017-05-22 RX ADMIN — Medication 10 ML: at 09:00

## 2017-05-22 RX ADMIN — DOCUSATE SODIUM 100 MG: 100 CAPSULE ORAL at 21:11

## 2017-05-22 RX ADMIN — ISOSORBIDE MONONITRATE 30 MG: 30 TABLET, EXTENDED RELEASE ORAL at 08:45

## 2017-05-22 RX ADMIN — INSULIN LISPRO 40 UNITS: 100 INJECTION, SUSPENSION SUBCUTANEOUS at 18:30

## 2017-05-22 RX ADMIN — SODIUM CHLORIDE 1000 ML: 900 INJECTION, SOLUTION INTRAVENOUS at 23:41

## 2017-05-22 RX ADMIN — FAMOTIDINE 40 MG: 20 TABLET ORAL at 08:44

## 2017-05-22 RX ADMIN — DOCUSATE SODIUM 100 MG: 100 CAPSULE ORAL at 08:45

## 2017-05-22 RX ADMIN — ACETAMINOPHEN 650 MG: 325 TABLET ORAL at 15:11

## 2017-05-22 RX ADMIN — LEVOTHYROXINE SODIUM 150 MCG: 150 TABLET ORAL at 06:51

## 2017-05-22 RX ADMIN — FOLIC ACID 1 MG: 1 TABLET ORAL at 08:45

## 2017-05-22 ASSESSMENT — PAIN SCALES - GENERAL
PAINLEVEL_OUTOF10: 0
PAINLEVEL_OUTOF10: 5
PAINLEVEL_OUTOF10: 0
PAINLEVEL_OUTOF10: 10
PAINLEVEL_OUTOF10: 0
PAINLEVEL_OUTOF10: 0
PAINLEVEL_OUTOF10: 10
PAINLEVEL_OUTOF10: 0
PAINLEVEL_OUTOF10: 8
PAINLEVEL_OUTOF10: 0
PAINLEVEL_OUTOF10: 0

## 2017-05-22 ASSESSMENT — PAIN DESCRIPTION - PROGRESSION
CLINICAL_PROGRESSION: GRADUALLY IMPROVING

## 2017-05-22 ASSESSMENT — PAIN DESCRIPTION - PAIN TYPE: TYPE: ACUTE PAIN

## 2017-05-22 ASSESSMENT — PAIN DESCRIPTION - DESCRIPTORS: DESCRIPTORS: ACHING;DISCOMFORT

## 2017-05-23 VITALS
WEIGHT: 234.4 LBS | TEMPERATURE: 97.3 F | BODY MASS INDEX: 35.52 KG/M2 | RESPIRATION RATE: 18 BRPM | OXYGEN SATURATION: 100 % | HEART RATE: 80 BPM | SYSTOLIC BLOOD PRESSURE: 83 MMHG | DIASTOLIC BLOOD PRESSURE: 68 MMHG | HEIGHT: 68 IN

## 2017-05-23 LAB
ANION GAP SERPL CALCULATED.3IONS-SCNC: 11 MEQ/L (ref 7–13)
BASOPHILS ABSOLUTE: 0 K/UL (ref 0–0.2)
BASOPHILS RELATIVE PERCENT: 0.2 %
BUN BLDV-MCNC: 11 MG/DL (ref 8–23)
CALCIUM SERPL-MCNC: 8 MG/DL (ref 8.6–10.2)
CHLORIDE BLD-SCNC: 105 MEQ/L (ref 98–107)
CO2: 20 MEQ/L (ref 22–29)
CREAT SERPL-MCNC: 0.95 MG/DL (ref 0.5–0.9)
EOSINOPHILS ABSOLUTE: 0 K/UL (ref 0–0.7)
EOSINOPHILS RELATIVE PERCENT: 1.1 %
GFR AFRICAN AMERICAN: >60
GFR NON-AFRICAN AMERICAN: 57.9
GLUCOSE BLD-MCNC: 129 MG/DL (ref 74–109)
GLUCOSE BLD-MCNC: 133 MG/DL (ref 60–115)
GLUCOSE BLD-MCNC: 204 MG/DL (ref 60–115)
GLUCOSE BLD-MCNC: 98 MG/DL (ref 60–115)
HCT VFR BLD CALC: 27.8 % (ref 37–47)
HEMOGLOBIN: 9.5 G/DL (ref 12–16)
INR BLD: 1.9
LYMPHOCYTES ABSOLUTE: 1.4 K/UL (ref 1–4.8)
LYMPHOCYTES RELATIVE PERCENT: 41.8 %
MAGNESIUM: 1.9 MG/DL (ref 1.7–2.3)
MCH RBC QN AUTO: 28.4 PG (ref 27–31.3)
MCHC RBC AUTO-ENTMCNC: 34.1 % (ref 33–37)
MCV RBC AUTO: 83.2 FL (ref 82–100)
MONOCYTES ABSOLUTE: 0.9 K/UL (ref 0.2–0.8)
MONOCYTES RELATIVE PERCENT: 28.5 %
NEUTROPHILS ABSOLUTE: 0.9 K/UL (ref 1.4–6.5)
NEUTROPHILS RELATIVE PERCENT: 28.4 %
PDW BLD-RTO: 16.8 % (ref 11.5–14.5)
PERFORMED ON: ABNORMAL
PERFORMED ON: ABNORMAL
PERFORMED ON: NORMAL
PLATELET # BLD: 135 K/UL (ref 130–400)
POTASSIUM SERPL-SCNC: 3.6 MEQ/L (ref 3.5–5.1)
PROTHROMBIN TIME: 21.3 SEC (ref 8.1–13.7)
RBC # BLD: 3.34 M/UL (ref 4.2–5.4)
SODIUM BLD-SCNC: 136 MEQ/L (ref 132–144)
WBC # BLD: 3.3 K/UL (ref 4.8–10.8)

## 2017-05-23 PROCEDURE — 85610 PROTHROMBIN TIME: CPT

## 2017-05-23 PROCEDURE — 83735 ASSAY OF MAGNESIUM: CPT

## 2017-05-23 PROCEDURE — 2580000003 HC RX 258: Performed by: HOSPITALIST

## 2017-05-23 PROCEDURE — 6360000002 HC RX W HCPCS: Performed by: INTERNAL MEDICINE

## 2017-05-23 PROCEDURE — 6370000000 HC RX 637 (ALT 250 FOR IP): Performed by: INTERNAL MEDICINE

## 2017-05-23 PROCEDURE — 6370000000 HC RX 637 (ALT 250 FOR IP): Performed by: HOSPITALIST

## 2017-05-23 PROCEDURE — 85025 COMPLETE CBC W/AUTO DIFF WBC: CPT

## 2017-05-23 PROCEDURE — 80048 BASIC METABOLIC PNL TOTAL CA: CPT

## 2017-05-23 PROCEDURE — 36415 COLL VENOUS BLD VENIPUNCTURE: CPT

## 2017-05-23 PROCEDURE — 99232 SBSQ HOSP IP/OBS MODERATE 35: CPT | Performed by: INTERNAL MEDICINE

## 2017-05-23 RX ORDER — GUAIFENESIN/DEXTROMETHORPHAN 100-10MG/5
5 SYRUP ORAL EVERY 4 HOURS PRN
Status: DISCONTINUED | OUTPATIENT
Start: 2017-05-23 | End: 2017-05-23 | Stop reason: HOSPADM

## 2017-05-23 RX ADMIN — PANTOPRAZOLE SODIUM 40 MG: 40 TABLET, DELAYED RELEASE ORAL at 05:52

## 2017-05-23 RX ADMIN — GUAIFENESIN AND DEXTROMETHORPHAN 5 ML: 100; 10 SYRUP ORAL at 14:44

## 2017-05-23 RX ADMIN — LEVOTHYROXINE SODIUM 150 MCG: 150 TABLET ORAL at 05:52

## 2017-05-23 RX ADMIN — ISOSORBIDE MONONITRATE 30 MG: 30 TABLET, EXTENDED RELEASE ORAL at 10:10

## 2017-05-23 RX ADMIN — PROPRANOLOL HYDROCHLORIDE 60 MG: 60 CAPSULE, EXTENDED RELEASE ORAL at 10:10

## 2017-05-23 RX ADMIN — PRAVASTATIN SODIUM 80 MG: 40 TABLET ORAL at 10:09

## 2017-05-23 RX ADMIN — FAMOTIDINE 40 MG: 20 TABLET ORAL at 10:10

## 2017-05-23 RX ADMIN — KETOROLAC TROMETHAMINE 30 MG: 30 INJECTION, SOLUTION INTRAMUSCULAR at 14:30

## 2017-05-23 RX ADMIN — ACETAMINOPHEN 650 MG: 325 TABLET ORAL at 18:11

## 2017-05-23 RX ADMIN — INSULIN LISPRO 4 UNITS: 100 INJECTION, SOLUTION INTRAVENOUS; SUBCUTANEOUS at 14:31

## 2017-05-23 RX ADMIN — POLYETHYLENE GLYCOL 3350 17 G: 17 POWDER, FOR SOLUTION ORAL at 10:09

## 2017-05-23 RX ADMIN — INSULIN LISPRO 40 UNITS: 100 INJECTION, SUSPENSION SUBCUTANEOUS at 10:12

## 2017-05-23 RX ADMIN — SODIUM CHLORIDE: 900 INJECTION, SOLUTION INTRAVENOUS at 05:55

## 2017-05-23 RX ADMIN — DOCUSATE SODIUM 100 MG: 100 CAPSULE ORAL at 10:10

## 2017-05-23 RX ADMIN — FOLIC ACID 1 MG: 1 TABLET ORAL at 10:10

## 2017-05-23 ASSESSMENT — PAIN SCALES - GENERAL
PAINLEVEL_OUTOF10: 10
PAINLEVEL_OUTOF10: 9

## 2017-05-23 ASSESSMENT — PAIN DESCRIPTION - PROGRESSION
CLINICAL_PROGRESSION: GRADUALLY IMPROVING

## 2017-05-24 ENCOUNTER — OFFICE VISIT (OUTPATIENT)
Dept: SURGERY | Age: 72
End: 2017-05-24

## 2017-05-24 VITALS — DIASTOLIC BLOOD PRESSURE: 75 MMHG | SYSTOLIC BLOOD PRESSURE: 132 MMHG | HEART RATE: 72 BPM

## 2017-05-24 LAB — GLUCOSE BLD-MCNC: 248 MG/DL

## 2017-05-24 PROCEDURE — 82962 GLUCOSE BLOOD TEST: CPT | Performed by: INTERNAL MEDICINE

## 2017-05-24 PROCEDURE — 99213 OFFICE O/P EST LOW 20 MIN: CPT | Performed by: INTERNAL MEDICINE

## 2017-05-26 LAB
EKG ATRIAL RATE: 80 BPM
EKG P AXIS: 15 DEGREES
EKG P-R INTERVAL: 168 MS
EKG Q-T INTERVAL: 360 MS
EKG QRS DURATION: 84 MS
EKG QTC CALCULATION (BAZETT): 415 MS
EKG R AXIS: 3 DEGREES
EKG T AXIS: 17 DEGREES
EKG VENTRICULAR RATE: 80 BPM

## 2017-05-31 ASSESSMENT — ENCOUNTER SYMPTOMS
WHEEZING: 1
SHORTNESS OF BREATH: 1
VISUAL CHANGE: 0

## 2017-06-09 ENCOUNTER — HOSPITAL ENCOUNTER (OUTPATIENT)
Dept: INFUSION THERAPY | Age: 72
Setting detail: INFUSION SERIES
Discharge: HOME | End: 2017-06-09
Payer: MEDICARE

## 2017-06-09 VITALS
TEMPERATURE: 97 F | RESPIRATION RATE: 18 BRPM | WEIGHT: 188 LBS | BODY MASS INDEX: 28.49 KG/M2 | HEART RATE: 65 BPM | DIASTOLIC BLOOD PRESSURE: 56 MMHG | SYSTOLIC BLOOD PRESSURE: 123 MMHG | HEIGHT: 68 IN

## 2017-06-09 PROCEDURE — 96409 CHEMO IV PUSH SNGL DRUG: CPT

## 2017-06-09 PROCEDURE — 2580000003 HC RX 258: Performed by: INTERNAL MEDICINE

## 2017-06-09 PROCEDURE — 96375 TX/PRO/DX INJ NEW DRUG ADDON: CPT

## 2017-06-09 PROCEDURE — 2580000003 HC RX 258

## 2017-06-09 PROCEDURE — 6360000002 HC RX W HCPCS: Performed by: INTERNAL MEDICINE

## 2017-06-09 RX ORDER — SODIUM CHLORIDE 0.9 % (FLUSH) 0.9 %
SYRINGE (ML) INJECTION
Status: COMPLETED
Start: 2017-06-09 | End: 2017-06-09

## 2017-06-09 RX ORDER — SODIUM CHLORIDE 9 MG/ML
INJECTION, SOLUTION INTRAVENOUS
Status: COMPLETED
Start: 2017-06-09 | End: 2017-06-09

## 2017-06-09 RX ADMIN — SODIUM CHLORIDE, PRESERVATIVE FREE 10 ML: 5 INJECTION INTRAVENOUS at 12:35

## 2017-06-09 RX ADMIN — SODIUM CHLORIDE 740 MG: 9 INJECTION, SOLUTION INTRAVENOUS at 13:15

## 2017-06-09 RX ADMIN — SODIUM CHLORIDE 250 ML: 9 INJECTION, SOLUTION INTRAVENOUS at 12:38

## 2017-06-09 RX ADMIN — DEXAMETHASONE SODIUM PHOSPHATE: 4 INJECTION, SOLUTION INTRAMUSCULAR; INTRAVENOUS at 12:40

## 2017-06-14 ENCOUNTER — TELEPHONE (OUTPATIENT)
Dept: SURGERY | Age: 72
End: 2017-06-14

## 2017-06-22 ENCOUNTER — OFFICE VISIT (OUTPATIENT)
Dept: SURGERY | Age: 72
End: 2017-06-22

## 2017-06-22 VITALS
BODY MASS INDEX: 35.61 KG/M2 | HEIGHT: 68 IN | WEIGHT: 235 LBS | HEART RATE: 72 BPM | DIASTOLIC BLOOD PRESSURE: 68 MMHG | SYSTOLIC BLOOD PRESSURE: 118 MMHG

## 2017-06-22 DIAGNOSIS — E11.65 TYPE 2 DIABETES MELLITUS WITH HYPERGLYCEMIA, WITHOUT LONG-TERM CURRENT USE OF INSULIN (HCC): Primary | ICD-10-CM

## 2017-06-22 PROCEDURE — 99213 OFFICE O/P EST LOW 20 MIN: CPT | Performed by: INTERNAL MEDICINE

## 2017-06-22 RX ORDER — FUROSEMIDE 40 MG/1
40 TABLET ORAL DAILY
Status: ON HOLD | COMMUNITY
End: 2017-08-30 | Stop reason: HOSPADM

## 2017-06-29 ENCOUNTER — HOSPITAL ENCOUNTER (OUTPATIENT)
Dept: CT IMAGING | Age: 72
Discharge: HOME | End: 2017-06-29
Payer: MEDICARE

## 2017-06-29 ENCOUNTER — HOSPITAL ENCOUNTER (OUTPATIENT)
Dept: CT IMAGING | Age: 72
End: 2017-06-29
Payer: MEDICARE

## 2017-06-29 DIAGNOSIS — C34.90 MALIGNANT NEOPLASM OF LUNG, UNSPECIFIED LATERALITY, UNSPECIFIED PART OF LUNG (HCC): ICD-10-CM

## 2017-06-29 PROCEDURE — 2580000003 HC RX 258: Performed by: RADIOLOGY

## 2017-06-29 PROCEDURE — 6360000004 HC RX CONTRAST MEDICATION: Performed by: RADIOLOGY

## 2017-06-29 PROCEDURE — 71260 CT THORAX DX C+: CPT

## 2017-06-29 PROCEDURE — 74177 CT ABD & PELVIS W/CONTRAST: CPT

## 2017-06-29 PROCEDURE — 2500000003 HC RX 250 WO HCPCS: Performed by: RADIOLOGY

## 2017-06-29 RX ORDER — SODIUM CHLORIDE 0.9 % (FLUSH) 0.9 %
10 SYRINGE (ML) INJECTION ONCE
Status: COMPLETED | OUTPATIENT
Start: 2017-06-29 | End: 2017-06-29

## 2017-06-29 RX ADMIN — Medication 10 ML: at 12:14

## 2017-06-29 RX ADMIN — IOPAMIDOL 100 ML: 755 INJECTION, SOLUTION INTRAVENOUS at 12:13

## 2017-06-29 RX ADMIN — BARIUM SULFATE 450 ML: 21 SUSPENSION ORAL at 12:13

## 2017-07-14 ENCOUNTER — HOSPITAL ENCOUNTER (OUTPATIENT)
Dept: INFUSION THERAPY | Age: 72
Setting detail: INFUSION SERIES
Discharge: HOME | End: 2017-07-14
Payer: MEDICARE

## 2017-07-14 VITALS
SYSTOLIC BLOOD PRESSURE: 133 MMHG | RESPIRATION RATE: 18 BRPM | HEART RATE: 69 BPM | DIASTOLIC BLOOD PRESSURE: 65 MMHG | TEMPERATURE: 97 F

## 2017-07-14 PROCEDURE — 2580000003 HC RX 258: Performed by: INTERNAL MEDICINE

## 2017-07-14 PROCEDURE — 96413 CHEMO IV INFUSION 1 HR: CPT

## 2017-07-14 PROCEDURE — 2580000003 HC RX 258

## 2017-07-14 PROCEDURE — 6360000002 HC RX W HCPCS: Performed by: INTERNAL MEDICINE

## 2017-07-14 RX ORDER — SODIUM CHLORIDE 9 MG/ML
INJECTION, SOLUTION INTRAVENOUS CONTINUOUS
Status: DISCONTINUED | OUTPATIENT
Start: 2017-07-14 | End: 2017-07-15 | Stop reason: HOSPADM

## 2017-07-14 RX ORDER — SODIUM CHLORIDE 9 MG/ML
INJECTION, SOLUTION INTRAVENOUS
Status: COMPLETED
Start: 2017-07-14 | End: 2017-07-14

## 2017-07-14 RX ORDER — SODIUM CHLORIDE 0.9 % (FLUSH) 0.9 %
10 SYRINGE (ML) INJECTION 2 TIMES DAILY
Status: DISCONTINUED | OUTPATIENT
Start: 2017-07-14 | End: 2017-07-15 | Stop reason: HOSPADM

## 2017-07-14 RX ADMIN — SODIUM CHLORIDE 250 ML: 9 INJECTION, SOLUTION INTRAVENOUS at 13:50

## 2017-07-14 RX ADMIN — SODIUM CHLORIDE, PRESERVATIVE FREE 10 ML: 5 INJECTION INTRAVENOUS at 11:40

## 2017-07-14 RX ADMIN — SODIUM CHLORIDE 240 MG: 9 INJECTION, SOLUTION INTRAVENOUS at 12:50

## 2017-08-01 ENCOUNTER — HOSPITAL ENCOUNTER (OUTPATIENT)
Dept: INFUSION THERAPY | Age: 72
Setting detail: INFUSION SERIES
Discharge: HOME | End: 2017-08-01
Payer: MEDICARE

## 2017-08-01 VITALS
SYSTOLIC BLOOD PRESSURE: 134 MMHG | RESPIRATION RATE: 16 BRPM | TEMPERATURE: 98.4 F | HEART RATE: 70 BPM | DIASTOLIC BLOOD PRESSURE: 66 MMHG

## 2017-08-01 PROCEDURE — 96413 CHEMO IV INFUSION 1 HR: CPT

## 2017-08-01 PROCEDURE — 2580000003 HC RX 258: Performed by: INTERNAL MEDICINE

## 2017-08-01 PROCEDURE — 6360000002 HC RX W HCPCS: Performed by: INTERNAL MEDICINE

## 2017-08-01 RX ORDER — SODIUM CHLORIDE 9 MG/ML
INJECTION, SOLUTION INTRAVENOUS
Status: DISCONTINUED
Start: 2017-08-01 | End: 2017-08-02 | Stop reason: HOSPADM

## 2017-08-01 RX ORDER — 0.9 % SODIUM CHLORIDE 0.9 %
250 INTRAVENOUS SOLUTION INTRAVENOUS ONCE
Status: DISCONTINUED | OUTPATIENT
Start: 2017-08-01 | End: 2017-08-02 | Stop reason: HOSPADM

## 2017-08-01 RX ADMIN — SODIUM CHLORIDE: 9 INJECTION, SOLUTION INTRAVENOUS at 13:50

## 2017-08-17 ENCOUNTER — HOSPITAL ENCOUNTER (OUTPATIENT)
Dept: INFUSION THERAPY | Age: 72
Setting detail: INFUSION SERIES
Discharge: HOME | End: 2017-08-17
Payer: MEDICARE

## 2017-08-17 VITALS
TEMPERATURE: 97.4 F | RESPIRATION RATE: 18 BRPM | SYSTOLIC BLOOD PRESSURE: 124 MMHG | DIASTOLIC BLOOD PRESSURE: 60 MMHG | HEART RATE: 66 BPM

## 2017-08-17 PROCEDURE — 96413 CHEMO IV INFUSION 1 HR: CPT

## 2017-08-17 PROCEDURE — 2580000003 HC RX 258: Performed by: INTERNAL MEDICINE

## 2017-08-17 PROCEDURE — 6360000002 HC RX W HCPCS: Performed by: INTERNAL MEDICINE

## 2017-08-17 PROCEDURE — 2580000003 HC RX 258

## 2017-08-17 RX ORDER — SODIUM CHLORIDE 9 MG/ML
INJECTION, SOLUTION INTRAVENOUS
Status: COMPLETED
Start: 2017-08-17 | End: 2017-08-17

## 2017-08-17 RX ADMIN — SODIUM CHLORIDE: 9 INJECTION, SOLUTION INTRAVENOUS at 13:38

## 2017-08-17 RX ADMIN — SODIUM CHLORIDE 250 ML: 9 INJECTION, SOLUTION INTRAVENOUS at 13:37

## 2017-08-17 ASSESSMENT — PAIN SCALES - GENERAL: PAINLEVEL_OUTOF10: 0

## 2017-08-24 ENCOUNTER — HOSPITAL ENCOUNTER (EMERGENCY)
Age: 72
Discharge: HOME | End: 2017-08-24
Payer: MEDICARE

## 2017-08-24 ENCOUNTER — OFFICE VISIT (OUTPATIENT)
Dept: SURGERY | Age: 72
End: 2017-08-24

## 2017-08-24 ENCOUNTER — APPOINTMENT (OUTPATIENT)
Dept: GENERAL RADIOLOGY | Age: 72
End: 2017-08-24
Payer: MEDICARE

## 2017-08-24 VITALS
TEMPERATURE: 98.1 F | BODY MASS INDEX: 35.61 KG/M2 | OXYGEN SATURATION: 99 % | DIASTOLIC BLOOD PRESSURE: 95 MMHG | HEART RATE: 87 BPM | RESPIRATION RATE: 20 BRPM | WEIGHT: 235 LBS | SYSTOLIC BLOOD PRESSURE: 114 MMHG | HEIGHT: 68 IN

## 2017-08-24 VITALS
WEIGHT: 236 LBS | BODY MASS INDEX: 35.77 KG/M2 | SYSTOLIC BLOOD PRESSURE: 138 MMHG | DIASTOLIC BLOOD PRESSURE: 79 MMHG | HEART RATE: 78 BPM | HEIGHT: 68 IN

## 2017-08-24 DIAGNOSIS — E11.65 TYPE 2 DIABETES MELLITUS WITH HYPERGLYCEMIA, WITH LONG-TERM CURRENT USE OF INSULIN (HCC): ICD-10-CM

## 2017-08-24 DIAGNOSIS — L02.91 ABSCESS: ICD-10-CM

## 2017-08-24 DIAGNOSIS — R06.02 SOB (SHORTNESS OF BREATH): ICD-10-CM

## 2017-08-24 DIAGNOSIS — Z79.4 TYPE 2 DIABETES MELLITUS WITH HYPERGLYCEMIA, WITH LONG-TERM CURRENT USE OF INSULIN (HCC): ICD-10-CM

## 2017-08-24 DIAGNOSIS — L30.9 DERMATITIS: Primary | ICD-10-CM

## 2017-08-24 LAB
ALBUMIN SERPL-MCNC: 3.8 G/DL (ref 3.9–4.9)
ALP BLD-CCNC: 136 U/L (ref 40–130)
ALT SERPL-CCNC: 16 U/L (ref 0–33)
ANION GAP SERPL CALCULATED.3IONS-SCNC: 14 MEQ/L (ref 7–13)
AST SERPL-CCNC: 24 U/L (ref 0–35)
BASOPHILS ABSOLUTE: 0.1 K/UL (ref 0–0.2)
BASOPHILS RELATIVE PERCENT: 1.1 %
BILIRUB SERPL-MCNC: 0.3 MG/DL (ref 0–1.2)
BUN BLDV-MCNC: 18 MG/DL (ref 8–23)
CALCIUM SERPL-MCNC: 9.7 MG/DL (ref 8.6–10.2)
CHLORIDE BLD-SCNC: 95 MEQ/L (ref 98–107)
CO2: 25 MEQ/L (ref 22–29)
CREAT SERPL-MCNC: 1.12 MG/DL (ref 0.5–0.9)
EKG ATRIAL RATE: 75 BPM
EKG P AXIS: 23 DEGREES
EKG P-R INTERVAL: 178 MS
EKG Q-T INTERVAL: 394 MS
EKG QRS DURATION: 84 MS
EKG QTC CALCULATION (BAZETT): 439 MS
EKG R AXIS: 6 DEGREES
EKG T AXIS: 14 DEGREES
EKG VENTRICULAR RATE: 75 BPM
EOSINOPHILS ABSOLUTE: 0.1 K/UL (ref 0–0.7)
EOSINOPHILS RELATIVE PERCENT: 1.5 %
GFR AFRICAN AMERICAN: 57.9
GFR NON-AFRICAN AMERICAN: 47.9
GLOBULIN: 3.6 G/DL (ref 2.3–3.5)
GLUCOSE BLD-MCNC: 335 MG/DL (ref 74–109)
GLUCOSE BLD-MCNC: 493 MG/DL
HBA1C MFR BLD: 12.1 %
HCT VFR BLD CALC: 39.8 % (ref 37–47)
HEMOGLOBIN: 13.3 G/DL (ref 12–16)
INR BLD: 2.4
LYMPHOCYTES ABSOLUTE: 1.7 K/UL (ref 1–4.8)
LYMPHOCYTES RELATIVE PERCENT: 25.6 %
MCH RBC QN AUTO: 27.8 PG (ref 27–31.3)
MCHC RBC AUTO-ENTMCNC: 33.4 % (ref 33–37)
MCV RBC AUTO: 83.3 FL (ref 82–100)
MONOCYTES ABSOLUTE: 0.6 K/UL (ref 0.2–0.8)
MONOCYTES RELATIVE PERCENT: 8.7 %
NEUTROPHILS ABSOLUTE: 4.1 K/UL (ref 1.4–6.5)
NEUTROPHILS RELATIVE PERCENT: 63.1 %
PDW BLD-RTO: 16.1 % (ref 11.5–14.5)
PLATELET # BLD: 225 K/UL (ref 130–400)
POTASSIUM SERPL-SCNC: 4.5 MEQ/L (ref 3.5–5.1)
PRO-BNP: 186 PG/ML
PROTHROMBIN TIME: 25.3 SEC (ref 8.1–13.7)
RBC # BLD: 4.78 M/UL (ref 4.2–5.4)
SODIUM BLD-SCNC: 134 MEQ/L (ref 132–144)
TOTAL PROTEIN: 7.4 G/DL (ref 6.4–8.1)
TROPONIN: <0.01 NG/ML (ref 0–0.01)
WBC # BLD: 6.5 K/UL (ref 4.8–10.8)

## 2017-08-24 PROCEDURE — 99213 OFFICE O/P EST LOW 20 MIN: CPT | Performed by: INTERNAL MEDICINE

## 2017-08-24 PROCEDURE — 83036 HEMOGLOBIN GLYCOSYLATED A1C: CPT | Performed by: INTERNAL MEDICINE

## 2017-08-24 PROCEDURE — 6370000000 HC RX 637 (ALT 250 FOR IP): Performed by: PHYSICIAN ASSISTANT

## 2017-08-24 PROCEDURE — 6360000002 HC RX W HCPCS: Performed by: PHYSICIAN ASSISTANT

## 2017-08-24 PROCEDURE — 94640 AIRWAY INHALATION TREATMENT: CPT

## 2017-08-24 PROCEDURE — 96374 THER/PROPH/DIAG INJ IV PUSH: CPT

## 2017-08-24 PROCEDURE — 83880 ASSAY OF NATRIURETIC PEPTIDE: CPT

## 2017-08-24 PROCEDURE — 80053 COMPREHEN METABOLIC PANEL: CPT

## 2017-08-24 PROCEDURE — 85025 COMPLETE CBC W/AUTO DIFF WBC: CPT

## 2017-08-24 PROCEDURE — 71010 XR CHEST PORTABLE: CPT

## 2017-08-24 PROCEDURE — 99285 EMERGENCY DEPT VISIT HI MDM: CPT

## 2017-08-24 PROCEDURE — 84484 ASSAY OF TROPONIN QUANT: CPT

## 2017-08-24 PROCEDURE — 85610 PROTHROMBIN TIME: CPT

## 2017-08-24 PROCEDURE — 36415 COLL VENOUS BLD VENIPUNCTURE: CPT

## 2017-08-24 PROCEDURE — 93005 ELECTROCARDIOGRAM TRACING: CPT

## 2017-08-24 PROCEDURE — 82962 GLUCOSE BLOOD TEST: CPT | Performed by: INTERNAL MEDICINE

## 2017-08-24 RX ORDER — MUPIROCIN CALCIUM 20 MG/G
CREAM TOPICAL
Qty: 1 TUBE | Refills: 0 | Status: SHIPPED | OUTPATIENT
Start: 2017-08-24 | End: 2017-09-23

## 2017-08-24 RX ORDER — HYDROXYZINE HYDROCHLORIDE 25 MG/1
25 TABLET, FILM COATED ORAL EVERY 6 HOURS PRN
Qty: 20 TABLET | Refills: 0 | Status: SHIPPED | OUTPATIENT
Start: 2017-08-24 | End: 2017-09-03

## 2017-08-24 RX ORDER — POTASSIUM CHLORIDE 1.5 G/1.77G
20 POWDER, FOR SOLUTION ORAL DAILY
Status: ON HOLD | COMMUNITY
End: 2017-08-30 | Stop reason: HOSPADM

## 2017-08-24 RX ORDER — IPRATROPIUM BROMIDE AND ALBUTEROL SULFATE 2.5; .5 MG/3ML; MG/3ML
1 SOLUTION RESPIRATORY (INHALATION) CONTINUOUS PRN
Status: DISCONTINUED | OUTPATIENT
Start: 2017-08-24 | End: 2017-08-24 | Stop reason: HOSPADM

## 2017-08-24 RX ORDER — DIPHENHYDRAMINE HYDROCHLORIDE 50 MG/ML
50 INJECTION INTRAMUSCULAR; INTRAVENOUS ONCE
Status: COMPLETED | OUTPATIENT
Start: 2017-08-24 | End: 2017-08-24

## 2017-08-24 RX ORDER — METHYLPREDNISOLONE SODIUM SUCCINATE 125 MG/2ML
80 INJECTION, POWDER, LYOPHILIZED, FOR SOLUTION INTRAMUSCULAR; INTRAVENOUS ONCE
Status: COMPLETED | OUTPATIENT
Start: 2017-08-24 | End: 2017-08-24

## 2017-08-24 RX ADMIN — IPRATROPIUM BROMIDE AND ALBUTEROL SULFATE 1 AMPULE: .5; 3 SOLUTION RESPIRATORY (INHALATION) at 01:49

## 2017-08-24 RX ADMIN — DIPHENHYDRAMINE HYDROCHLORIDE 50 MG: 50 INJECTION, SOLUTION INTRAMUSCULAR; INTRAVENOUS at 02:40

## 2017-08-24 RX ADMIN — METHYLPREDNISOLONE SODIUM SUCCINATE 80 MG: 125 INJECTION, POWDER, FOR SOLUTION INTRAMUSCULAR; INTRAVENOUS at 02:37

## 2017-08-24 RX ADMIN — INSULIN HUMAN 5 UNITS: 100 INJECTION, SOLUTION PARENTERAL at 03:30

## 2017-08-24 ASSESSMENT — ENCOUNTER SYMPTOMS
VOICE CHANGE: 0
NAUSEA: 0
ABDOMINAL DISTENTION: 0
SHORTNESS OF BREATH: 1
EYE DISCHARGE: 0
PHOTOPHOBIA: 0
EYE PAIN: 0
ANAL BLEEDING: 0
SHORTNESS OF BREATH: 1
APNEA: 0
COUGH: 1
BACK PAIN: 0
VOMITING: 0

## 2017-08-25 ENCOUNTER — HOSPITAL ENCOUNTER (EMERGENCY)
Age: 72
Discharge: HOME | DRG: 607 | End: 2017-08-25
Payer: MEDICARE

## 2017-08-25 ENCOUNTER — APPOINTMENT (OUTPATIENT)
Dept: GENERAL RADIOLOGY | Age: 72
DRG: 607 | End: 2017-08-25
Payer: MEDICARE

## 2017-08-25 VITALS
WEIGHT: 235 LBS | RESPIRATION RATE: 18 BRPM | HEART RATE: 68 BPM | OXYGEN SATURATION: 98 % | HEIGHT: 68 IN | BODY MASS INDEX: 35.61 KG/M2 | SYSTOLIC BLOOD PRESSURE: 139 MMHG | DIASTOLIC BLOOD PRESSURE: 60 MMHG | TEMPERATURE: 97.1 F

## 2017-08-25 DIAGNOSIS — L50.9 URTICARIA: Primary | ICD-10-CM

## 2017-08-25 LAB
ALBUMIN SERPL-MCNC: 3.6 G/DL (ref 3.9–4.9)
ALP BLD-CCNC: 113 U/L (ref 40–130)
ALT SERPL-CCNC: 15 U/L (ref 0–33)
AMORPHOUS: NORMAL
ANION GAP SERPL CALCULATED.3IONS-SCNC: 16 MEQ/L (ref 7–13)
AST SERPL-CCNC: 23 U/L (ref 0–35)
BACTERIA: NORMAL /HPF
BASOPHILS ABSOLUTE: 0 K/UL (ref 0–0.2)
BASOPHILS RELATIVE PERCENT: 0.5 %
BILIRUB SERPL-MCNC: 0.3 MG/DL (ref 0–1.2)
BILIRUBIN URINE: NEGATIVE
BLOOD, URINE: NEGATIVE
BUN BLDV-MCNC: 24 MG/DL (ref 8–23)
CALCIUM SERPL-MCNC: 9.3 MG/DL (ref 8.6–10.2)
CHLORIDE BLD-SCNC: 96 MEQ/L (ref 98–107)
CLARITY: CLEAR
CO2: 23 MEQ/L (ref 22–29)
COLOR: YELLOW
CREAT SERPL-MCNC: 1.13 MG/DL (ref 0.5–0.9)
EOSINOPHILS ABSOLUTE: 0.2 K/UL (ref 0–0.7)
EOSINOPHILS RELATIVE PERCENT: 1.9 %
EPITHELIAL CELLS, UA: NORMAL /HPF
GFR AFRICAN AMERICAN: 57.3
GFR NON-AFRICAN AMERICAN: 47.4
GLOBULIN: 3.2 G/DL (ref 2.3–3.5)
GLUCOSE BLD-MCNC: 158 MG/DL (ref 74–109)
GLUCOSE URINE: 250 MG/DL
HCT VFR BLD CALC: 36.1 % (ref 37–47)
HEMOGLOBIN: 12.1 G/DL (ref 12–16)
INR BLD: 2.7
KETONES, URINE: NEGATIVE MG/DL
LEUKOCYTE ESTERASE, URINE: NEGATIVE
LYMPHOCYTES ABSOLUTE: 2.1 K/UL (ref 1–4.8)
LYMPHOCYTES RELATIVE PERCENT: 21.8 %
MAGNESIUM: 1.8 MG/DL (ref 1.7–2.3)
MCH RBC QN AUTO: 27.8 PG (ref 27–31.3)
MCHC RBC AUTO-ENTMCNC: 33.5 % (ref 33–37)
MCV RBC AUTO: 83 FL (ref 82–100)
MONOCYTES ABSOLUTE: 0.8 K/UL (ref 0.2–0.8)
MONOCYTES RELATIVE PERCENT: 8.3 %
NEUTROPHILS ABSOLUTE: 6.7 K/UL (ref 1.4–6.5)
NEUTROPHILS RELATIVE PERCENT: 67.5 %
NITRITE, URINE: NEGATIVE
PDW BLD-RTO: 16.1 % (ref 11.5–14.5)
PH UA: 5 (ref 5–9)
PHOSPHORUS: 3.4 MG/DL (ref 2.5–4.5)
PLATELET # BLD: 222 K/UL (ref 130–400)
POTASSIUM SERPL-SCNC: 3.7 MEQ/L (ref 3.5–5.1)
PROTEIN UA: 100 MG/DL
PROTHROMBIN TIME: 29 SEC (ref 8.1–13.7)
RBC # BLD: 4.36 M/UL (ref 4.2–5.4)
RBC UA: NORMAL /HPF (ref 0–2)
SODIUM BLD-SCNC: 135 MEQ/L (ref 132–144)
SPECIFIC GRAVITY UA: 1.01 (ref 1–1.03)
TOTAL PROTEIN: 6.8 G/DL (ref 6.4–8.1)
URINE REFLEX TO CULTURE: YES
UROBILINOGEN, URINE: 0.2 E.U./DL
WBC # BLD: 9.9 K/UL (ref 4.8–10.8)
WBC UA: NORMAL /HPF (ref 0–5)

## 2017-08-25 PROCEDURE — 6360000002 HC RX W HCPCS: Performed by: PHYSICIAN ASSISTANT

## 2017-08-25 PROCEDURE — 96375 TX/PRO/DX INJ NEW DRUG ADDON: CPT

## 2017-08-25 PROCEDURE — 6370000000 HC RX 637 (ALT 250 FOR IP): Performed by: PHYSICIAN ASSISTANT

## 2017-08-25 PROCEDURE — 71020 XR CHEST STANDARD TWO VW: CPT

## 2017-08-25 PROCEDURE — 80053 COMPREHEN METABOLIC PANEL: CPT

## 2017-08-25 PROCEDURE — 85025 COMPLETE CBC W/AUTO DIFF WBC: CPT

## 2017-08-25 PROCEDURE — 2580000003 HC RX 258: Performed by: PHYSICIAN ASSISTANT

## 2017-08-25 PROCEDURE — 81001 URINALYSIS AUTO W/SCOPE: CPT

## 2017-08-25 PROCEDURE — 36415 COLL VENOUS BLD VENIPUNCTURE: CPT

## 2017-08-25 PROCEDURE — 83735 ASSAY OF MAGNESIUM: CPT

## 2017-08-25 PROCEDURE — 96374 THER/PROPH/DIAG INJ IV PUSH: CPT

## 2017-08-25 PROCEDURE — 99283 EMERGENCY DEPT VISIT LOW MDM: CPT

## 2017-08-25 PROCEDURE — 85610 PROTHROMBIN TIME: CPT

## 2017-08-25 PROCEDURE — 2500000003 HC RX 250 WO HCPCS: Performed by: PHYSICIAN ASSISTANT

## 2017-08-25 PROCEDURE — S0028 INJECTION, FAMOTIDINE, 20 MG: HCPCS | Performed by: PHYSICIAN ASSISTANT

## 2017-08-25 PROCEDURE — 87086 URINE CULTURE/COLONY COUNT: CPT

## 2017-08-25 PROCEDURE — 84100 ASSAY OF PHOSPHORUS: CPT

## 2017-08-25 RX ORDER — LORAZEPAM 2 MG/ML
1 INJECTION INTRAMUSCULAR ONCE
Status: COMPLETED | OUTPATIENT
Start: 2017-08-25 | End: 2017-08-25

## 2017-08-25 RX ORDER — METHYLPREDNISOLONE SODIUM SUCCINATE 125 MG/2ML
125 INJECTION, POWDER, LYOPHILIZED, FOR SOLUTION INTRAMUSCULAR; INTRAVENOUS ONCE
Status: COMPLETED | OUTPATIENT
Start: 2017-08-25 | End: 2017-08-25

## 2017-08-25 RX ORDER — 0.9 % SODIUM CHLORIDE 0.9 %
1000 INTRAVENOUS SOLUTION INTRAVENOUS ONCE
Status: COMPLETED | OUTPATIENT
Start: 2017-08-25 | End: 2017-08-25

## 2017-08-25 RX ORDER — HYDROXYZINE PAMOATE 25 MG/1
50 CAPSULE ORAL ONCE
Status: COMPLETED | OUTPATIENT
Start: 2017-08-25 | End: 2017-08-25

## 2017-08-25 RX ORDER — DIPHENHYDRAMINE HYDROCHLORIDE 50 MG/ML
50 INJECTION INTRAMUSCULAR; INTRAVENOUS ONCE
Status: COMPLETED | OUTPATIENT
Start: 2017-08-25 | End: 2017-08-25

## 2017-08-25 RX ORDER — LORAZEPAM 1 MG/1
1 TABLET ORAL NIGHTLY PRN
Qty: 7 TABLET | Refills: 0 | Status: SHIPPED | OUTPATIENT
Start: 2017-08-25 | End: 2017-09-04

## 2017-08-25 RX ORDER — METHYLPREDNISOLONE 4 MG/1
TABLET ORAL
Qty: 21 TABLET | Refills: 0 | Status: ON HOLD | OUTPATIENT
Start: 2017-08-25 | End: 2017-08-30 | Stop reason: HOSPADM

## 2017-08-25 RX ADMIN — METHYLPREDNISOLONE SODIUM SUCCINATE 125 MG: 125 INJECTION, POWDER, FOR SOLUTION INTRAMUSCULAR; INTRAVENOUS at 14:32

## 2017-08-25 RX ADMIN — SODIUM CHLORIDE 1000 ML: 9 INJECTION, SOLUTION INTRAVENOUS at 14:32

## 2017-08-25 RX ADMIN — LORAZEPAM 1 MG: 2 INJECTION INTRAMUSCULAR; INTRAVENOUS at 16:06

## 2017-08-25 RX ADMIN — FAMOTIDINE 20 MG: 10 INJECTION INTRAVENOUS at 14:32

## 2017-08-25 RX ADMIN — HYDROXYZINE PAMOATE 50 MG: 25 CAPSULE ORAL at 15:22

## 2017-08-25 RX ADMIN — DIPHENHYDRAMINE HYDROCHLORIDE 50 MG: 50 INJECTION, SOLUTION INTRAMUSCULAR; INTRAVENOUS at 14:32

## 2017-08-25 ASSESSMENT — ENCOUNTER SYMPTOMS
TROUBLE SWALLOWING: 0
ALLERGIC/IMMUNOLOGIC NEGATIVE: 1
APNEA: 0
COLOR CHANGE: 0
EYE PAIN: 0
ABDOMINAL PAIN: 0
SHORTNESS OF BREATH: 0

## 2017-08-27 LAB — URINE CULTURE, ROUTINE: NORMAL

## 2017-08-28 ENCOUNTER — HOSPITAL ENCOUNTER (INPATIENT)
Age: 72
LOS: 4 days | Discharge: HOME | DRG: 607 | End: 2017-09-01
Attending: INTERNAL MEDICINE | Admitting: FAMILY MEDICINE
Payer: MEDICARE

## 2017-08-28 LAB
GLUCOSE BLD-MCNC: 91 MG/DL (ref 60–115)
PERFORMED ON: NORMAL

## 2017-08-28 PROCEDURE — 2580000003 HC RX 258: Performed by: NURSE PRACTITIONER

## 2017-08-28 PROCEDURE — 6360000002 HC RX W HCPCS: Performed by: NURSE PRACTITIONER

## 2017-08-28 PROCEDURE — 6370000000 HC RX 637 (ALT 250 FOR IP): Performed by: NURSE PRACTITIONER

## 2017-08-28 PROCEDURE — 1210000000 HC MED SURG R&B

## 2017-08-28 RX ORDER — NICOTINE POLACRILEX 4 MG
15 LOZENGE BUCCAL PRN
Status: DISCONTINUED | OUTPATIENT
Start: 2017-08-28 | End: 2017-08-29 | Stop reason: ALTCHOICE

## 2017-08-28 RX ORDER — FAMOTIDINE 20 MG/1
20 TABLET, FILM COATED ORAL 2 TIMES DAILY
Status: DISCONTINUED | OUTPATIENT
Start: 2017-08-28 | End: 2017-09-01 | Stop reason: HOSPADM

## 2017-08-28 RX ORDER — DEXTROSE MONOHYDRATE 25 G/50ML
12.5 INJECTION, SOLUTION INTRAVENOUS PRN
Status: DISCONTINUED | OUTPATIENT
Start: 2017-08-28 | End: 2017-08-29 | Stop reason: ALTCHOICE

## 2017-08-28 RX ORDER — ACETAMINOPHEN 325 MG/1
650 TABLET ORAL EVERY 4 HOURS PRN
Status: DISCONTINUED | OUTPATIENT
Start: 2017-08-28 | End: 2017-09-01 | Stop reason: HOSPADM

## 2017-08-28 RX ORDER — METHYLPREDNISOLONE SODIUM SUCCINATE 40 MG/ML
40 INJECTION, POWDER, LYOPHILIZED, FOR SOLUTION INTRAMUSCULAR; INTRAVENOUS EVERY 8 HOURS
Status: DISCONTINUED | OUTPATIENT
Start: 2017-08-28 | End: 2017-08-30

## 2017-08-28 RX ORDER — ONDANSETRON 2 MG/ML
4 INJECTION INTRAMUSCULAR; INTRAVENOUS EVERY 6 HOURS PRN
Status: DISCONTINUED | OUTPATIENT
Start: 2017-08-28 | End: 2017-09-01 | Stop reason: HOSPADM

## 2017-08-28 RX ORDER — SODIUM CHLORIDE 0.9 % (FLUSH) 0.9 %
10 SYRINGE (ML) INJECTION PRN
Status: DISCONTINUED | OUTPATIENT
Start: 2017-08-28 | End: 2017-09-01 | Stop reason: HOSPADM

## 2017-08-28 RX ORDER — SODIUM CHLORIDE 9 MG/ML
INJECTION, SOLUTION INTRAVENOUS CONTINUOUS
Status: DISPENSED | OUTPATIENT
Start: 2017-08-28 | End: 2017-08-29

## 2017-08-28 RX ORDER — DIPHENHYDRAMINE HYDROCHLORIDE 50 MG/ML
50 INJECTION INTRAMUSCULAR; INTRAVENOUS EVERY 6 HOURS PRN
Status: DISCONTINUED | OUTPATIENT
Start: 2017-08-28 | End: 2017-09-01 | Stop reason: HOSPADM

## 2017-08-28 RX ORDER — SODIUM CHLORIDE 0.9 % (FLUSH) 0.9 %
10 SYRINGE (ML) INJECTION EVERY 12 HOURS SCHEDULED
Status: DISCONTINUED | OUTPATIENT
Start: 2017-08-28 | End: 2017-09-01 | Stop reason: HOSPADM

## 2017-08-28 RX ORDER — HEPARIN SODIUM 5000 [USP'U]/ML
5000 INJECTION, SOLUTION INTRAVENOUS; SUBCUTANEOUS EVERY 8 HOURS SCHEDULED
Status: DISCONTINUED | OUTPATIENT
Start: 2017-08-28 | End: 2017-09-01 | Stop reason: HOSPADM

## 2017-08-28 RX ORDER — DEXTROSE MONOHYDRATE 50 MG/ML
100 INJECTION, SOLUTION INTRAVENOUS PRN
Status: DISCONTINUED | OUTPATIENT
Start: 2017-08-28 | End: 2017-08-29 | Stop reason: ALTCHOICE

## 2017-08-28 RX ADMIN — DIPHENHYDRAMINE HYDROCHLORIDE 50 MG: 50 INJECTION, SOLUTION INTRAMUSCULAR; INTRAVENOUS at 22:29

## 2017-08-28 RX ADMIN — SODIUM CHLORIDE: 9 INJECTION, SOLUTION INTRAVENOUS at 21:54

## 2017-08-28 RX ADMIN — HEPARIN SODIUM 5000 UNITS: 5000 INJECTION, SOLUTION INTRAVENOUS; SUBCUTANEOUS at 22:37

## 2017-08-28 RX ADMIN — FAMOTIDINE 20 MG: 20 TABLET ORAL at 22:37

## 2017-08-28 RX ADMIN — METHYLPREDNISOLONE SODIUM SUCCINATE 40 MG: 40 INJECTION, POWDER, FOR SOLUTION INTRAMUSCULAR; INTRAVENOUS at 21:54

## 2017-08-29 ENCOUNTER — APPOINTMENT (OUTPATIENT)
Dept: CT IMAGING | Age: 72
DRG: 607 | End: 2017-08-29
Attending: INTERNAL MEDICINE
Payer: MEDICARE

## 2017-08-29 LAB
ALBUMIN SERPL-MCNC: 3.4 G/DL (ref 3.9–4.9)
ALP BLD-CCNC: 105 U/L (ref 40–130)
ALT SERPL-CCNC: 18 U/L (ref 0–33)
ANION GAP SERPL CALCULATED.3IONS-SCNC: 16 MEQ/L (ref 7–13)
AST SERPL-CCNC: 22 U/L (ref 0–35)
BASOPHILS ABSOLUTE: 0 K/UL (ref 0–0.2)
BASOPHILS RELATIVE PERCENT: 0.3 %
BILIRUB SERPL-MCNC: 0.2 MG/DL (ref 0–1.2)
BUN BLDV-MCNC: 22 MG/DL (ref 8–23)
CALCIUM SERPL-MCNC: 8.6 MG/DL (ref 8.6–10.2)
CHLORIDE BLD-SCNC: 98 MEQ/L (ref 98–107)
CO2: 22 MEQ/L (ref 22–29)
CREAT SERPL-MCNC: 0.92 MG/DL (ref 0.5–0.9)
CREATININE URINE: 135.6 MG/DL
EOSINOPHILS ABSOLUTE: 0 K/UL (ref 0–0.7)
EOSINOPHILS RELATIVE PERCENT: 0.5 %
GFR AFRICAN AMERICAN: >60
GFR NON-AFRICAN AMERICAN: >60
GLOBULIN: 2.8 G/DL (ref 2.3–3.5)
GLUCOSE BLD-MCNC: 233 MG/DL (ref 60–115)
GLUCOSE BLD-MCNC: 257 MG/DL (ref 74–109)
GLUCOSE BLD-MCNC: 366 MG/DL (ref 60–115)
GLUCOSE BLD-MCNC: 367 MG/DL (ref 60–115)
GLUCOSE BLD-MCNC: 549 MG/DL (ref 60–115)
HCT VFR BLD CALC: 36.7 % (ref 37–47)
HEMOGLOBIN: 12.3 G/DL (ref 12–16)
INR BLD: 2.8
LYMPHOCYTES ABSOLUTE: 1.2 K/UL (ref 1–4.8)
LYMPHOCYTES RELATIVE PERCENT: 16.6 %
MCH RBC QN AUTO: 28.2 PG (ref 27–31.3)
MCHC RBC AUTO-ENTMCNC: 33.7 % (ref 33–37)
MCV RBC AUTO: 83.8 FL (ref 82–100)
MONOCYTES ABSOLUTE: 0.3 K/UL (ref 0.2–0.8)
MONOCYTES RELATIVE PERCENT: 3.6 %
NEUTROPHILS ABSOLUTE: 5.6 K/UL (ref 1.4–6.5)
NEUTROPHILS RELATIVE PERCENT: 79 %
PDW BLD-RTO: 15.9 % (ref 11.5–14.5)
PERFORMED ON: ABNORMAL
PLATELET # BLD: 207 K/UL (ref 130–400)
POTASSIUM SERPL-SCNC: 4.3 MEQ/L (ref 3.5–5.1)
PROTHROMBIN TIME: 29.9 SEC (ref 8.1–13.7)
RBC # BLD: 4.38 M/UL (ref 4.2–5.4)
SODIUM BLD-SCNC: 136 MEQ/L (ref 132–144)
SODIUM URINE: 64 MEQ/L
TOTAL PROTEIN: 6.2 G/DL (ref 6.4–8.1)
WBC # BLD: 7.1 K/UL (ref 4.8–10.8)

## 2017-08-29 PROCEDURE — 2580000003 HC RX 258: Performed by: NURSE PRACTITIONER

## 2017-08-29 PROCEDURE — 6360000002 HC RX W HCPCS: Performed by: NURSE PRACTITIONER

## 2017-08-29 PROCEDURE — 82570 ASSAY OF URINE CREATININE: CPT

## 2017-08-29 PROCEDURE — 6370000000 HC RX 637 (ALT 250 FOR IP): Performed by: NURSE PRACTITIONER

## 2017-08-29 PROCEDURE — 85025 COMPLETE CBC W/AUTO DIFF WBC: CPT

## 2017-08-29 PROCEDURE — 6360000004 HC RX CONTRAST MEDICATION: Performed by: RADIOLOGY

## 2017-08-29 PROCEDURE — G0378 HOSPITAL OBSERVATION PER HR: HCPCS

## 2017-08-29 PROCEDURE — 80053 COMPREHEN METABOLIC PANEL: CPT

## 2017-08-29 PROCEDURE — 85610 PROTHROMBIN TIME: CPT

## 2017-08-29 PROCEDURE — 1210000000 HC MED SURG R&B

## 2017-08-29 PROCEDURE — 84300 ASSAY OF URINE SODIUM: CPT

## 2017-08-29 PROCEDURE — 6370000000 HC RX 637 (ALT 250 FOR IP): Performed by: FAMILY MEDICINE

## 2017-08-29 PROCEDURE — 94664 DEMO&/EVAL PT USE INHALER: CPT

## 2017-08-29 PROCEDURE — 6370000000 HC RX 637 (ALT 250 FOR IP): Performed by: INTERNAL MEDICINE

## 2017-08-29 PROCEDURE — 36415 COLL VENOUS BLD VENIPUNCTURE: CPT

## 2017-08-29 PROCEDURE — 71260 CT THORAX DX C+: CPT

## 2017-08-29 RX ORDER — LORAZEPAM 1 MG/1
1 TABLET ORAL NIGHTLY PRN
Status: DISCONTINUED | OUTPATIENT
Start: 2017-08-29 | End: 2017-09-01 | Stop reason: HOSPADM

## 2017-08-29 RX ORDER — PRAVASTATIN SODIUM 80 MG/1
80 TABLET ORAL DAILY
Status: DISCONTINUED | OUTPATIENT
Start: 2017-08-29 | End: 2017-09-01 | Stop reason: HOSPADM

## 2017-08-29 RX ORDER — POLYETHYLENE GLYCOL 3350 17 G/17G
17 POWDER, FOR SOLUTION ORAL DAILY
Status: DISCONTINUED | OUTPATIENT
Start: 2017-08-29 | End: 2017-09-01 | Stop reason: HOSPADM

## 2017-08-29 RX ORDER — ALBUTEROL SULFATE 90 UG/1
2 AEROSOL, METERED RESPIRATORY (INHALATION) EVERY 6 HOURS PRN
Status: DISCONTINUED | OUTPATIENT
Start: 2017-08-29 | End: 2017-09-01 | Stop reason: HOSPADM

## 2017-08-29 RX ORDER — FOLIC ACID 1 MG/1
1 TABLET ORAL DAILY
Status: DISCONTINUED | OUTPATIENT
Start: 2017-08-29 | End: 2017-09-01 | Stop reason: HOSPADM

## 2017-08-29 RX ORDER — DOXEPIN HYDROCHLORIDE 100 MG/1
200 CAPSULE ORAL NIGHTLY
Status: DISCONTINUED | OUTPATIENT
Start: 2017-08-29 | End: 2017-09-01 | Stop reason: HOSPADM

## 2017-08-29 RX ORDER — DEXTROSE MONOHYDRATE 25 G/50ML
12.5 INJECTION, SOLUTION INTRAVENOUS PRN
Status: DISCONTINUED | OUTPATIENT
Start: 2017-08-29 | End: 2017-09-01 | Stop reason: HOSPADM

## 2017-08-29 RX ORDER — ISOSORBIDE MONONITRATE 30 MG/1
30 TABLET, EXTENDED RELEASE ORAL DAILY
Status: DISCONTINUED | OUTPATIENT
Start: 2017-08-29 | End: 2017-09-01 | Stop reason: HOSPADM

## 2017-08-29 RX ORDER — MUPIROCIN CALCIUM 20 MG/G
CREAM TOPICAL 2 TIMES DAILY
Status: DISCONTINUED | OUTPATIENT
Start: 2017-08-29 | End: 2017-09-01 | Stop reason: HOSPADM

## 2017-08-29 RX ORDER — LEVOTHYROXINE SODIUM 0.15 MG/1
150 TABLET ORAL DAILY
Status: DISCONTINUED | OUTPATIENT
Start: 2017-08-29 | End: 2017-09-01 | Stop reason: HOSPADM

## 2017-08-29 RX ORDER — DEXTROSE MONOHYDRATE 50 MG/ML
100 INJECTION, SOLUTION INTRAVENOUS PRN
Status: DISCONTINUED | OUTPATIENT
Start: 2017-08-29 | End: 2017-09-01 | Stop reason: HOSPADM

## 2017-08-29 RX ORDER — NICOTINE POLACRILEX 4 MG
15 LOZENGE BUCCAL PRN
Status: DISCONTINUED | OUTPATIENT
Start: 2017-08-29 | End: 2017-09-01 | Stop reason: HOSPADM

## 2017-08-29 RX ORDER — PROPRANOLOL HCL 60 MG
60 CAPSULE, EXTENDED RELEASE 24HR ORAL 2 TIMES DAILY
Status: DISCONTINUED | OUTPATIENT
Start: 2017-08-29 | End: 2017-09-01 | Stop reason: HOSPADM

## 2017-08-29 RX ORDER — ASPIRIN 81 MG/1
81 TABLET ORAL DAILY
Status: DISCONTINUED | OUTPATIENT
Start: 2017-08-29 | End: 2017-09-01 | Stop reason: HOSPADM

## 2017-08-29 RX ORDER — PROPRANOLOL HCL 60 MG
60 CAPSULE, EXTENDED RELEASE 24HR ORAL DAILY
Status: DISCONTINUED | OUTPATIENT
Start: 2017-08-29 | End: 2017-08-29

## 2017-08-29 RX ORDER — PRIMIDONE 50 MG/1
50 TABLET ORAL NIGHTLY
Status: DISCONTINUED | OUTPATIENT
Start: 2017-08-29 | End: 2017-09-01 | Stop reason: HOSPADM

## 2017-08-29 RX ORDER — NITROGLYCERIN 0.4 MG/1
0.4 TABLET SUBLINGUAL EVERY 5 MIN PRN
Status: DISCONTINUED | OUTPATIENT
Start: 2017-08-29 | End: 2017-09-01 | Stop reason: HOSPADM

## 2017-08-29 RX ADMIN — FOLIC ACID 1 MG: 1 TABLET ORAL at 08:57

## 2017-08-29 RX ADMIN — SODIUM CHLORIDE: 9 INJECTION, SOLUTION INTRAVENOUS at 06:30

## 2017-08-29 RX ADMIN — FAMOTIDINE 20 MG: 20 TABLET ORAL at 23:18

## 2017-08-29 RX ADMIN — HEPARIN SODIUM 5000 UNITS: 5000 INJECTION, SOLUTION INTRAVENOUS; SUBCUTANEOUS at 23:24

## 2017-08-29 RX ADMIN — SODIUM CHLORIDE, PRESERVATIVE FREE 10 ML: 5 INJECTION INTRAVENOUS at 10:43

## 2017-08-29 RX ADMIN — LEVOTHYROXINE SODIUM 150 MCG: 150 TABLET ORAL at 10:43

## 2017-08-29 RX ADMIN — DIPHENHYDRAMINE HYDROCHLORIDE 50 MG: 50 INJECTION, SOLUTION INTRAMUSCULAR; INTRAVENOUS at 10:43

## 2017-08-29 RX ADMIN — SODIUM CHLORIDE, PRESERVATIVE FREE 10 ML: 5 INJECTION INTRAVENOUS at 23:21

## 2017-08-29 RX ADMIN — HEPARIN SODIUM 5000 UNITS: 5000 INJECTION, SOLUTION INTRAVENOUS; SUBCUTANEOUS at 14:13

## 2017-08-29 RX ADMIN — PRIMIDONE 50 MG: 50 TABLET ORAL at 23:17

## 2017-08-29 RX ADMIN — ASPIRIN 81 MG: 81 TABLET, COATED ORAL at 08:57

## 2017-08-29 RX ADMIN — IOPAMIDOL 75 ML: 755 INJECTION, SOLUTION INTRAVENOUS at 12:30

## 2017-08-29 RX ADMIN — DIPHENHYDRAMINE HYDROCHLORIDE 50 MG: 50 INJECTION, SOLUTION INTRAMUSCULAR; INTRAVENOUS at 04:20

## 2017-08-29 RX ADMIN — ISOSORBIDE MONONITRATE 30 MG: 30 TABLET, EXTENDED RELEASE ORAL at 08:57

## 2017-08-29 RX ADMIN — DIPHENHYDRAMINE HYDROCHLORIDE 50 MG: 50 INJECTION, SOLUTION INTRAMUSCULAR; INTRAVENOUS at 20:45

## 2017-08-29 RX ADMIN — POLYETHYLENE GLYCOL 3350 17 G: 17 POWDER, FOR SOLUTION ORAL at 17:20

## 2017-08-29 RX ADMIN — SODIUM CHLORIDE: 9 INJECTION, SOLUTION INTRAVENOUS at 17:20

## 2017-08-29 RX ADMIN — METHYLPREDNISOLONE SODIUM SUCCINATE 40 MG: 40 INJECTION, POWDER, FOR SOLUTION INTRAMUSCULAR; INTRAVENOUS at 06:25

## 2017-08-29 RX ADMIN — PROPRANOLOL HYDROCHLORIDE 60 MG: 60 CAPSULE, EXTENDED RELEASE ORAL at 08:57

## 2017-08-29 RX ADMIN — METHYLPREDNISOLONE SODIUM SUCCINATE 40 MG: 40 INJECTION, POWDER, FOR SOLUTION INTRAMUSCULAR; INTRAVENOUS at 14:13

## 2017-08-29 RX ADMIN — HEPARIN SODIUM 5000 UNITS: 5000 INJECTION, SOLUTION INTRAVENOUS; SUBCUTANEOUS at 06:25

## 2017-08-29 RX ADMIN — DOXEPIN HYDROCHLORIDE 200 MG: 100 CAPSULE ORAL at 23:17

## 2017-08-29 RX ADMIN — INSULIN HUMAN 80 UNITS: 100 INJECTION, SUSPENSION SUBCUTANEOUS at 09:05

## 2017-08-29 RX ADMIN — PRAVASTATIN SODIUM 80 MG: 80 TABLET ORAL at 23:17

## 2017-08-29 RX ADMIN — PROPRANOLOL HYDROCHLORIDE 60 MG: 60 CAPSULE, EXTENDED RELEASE ORAL at 23:18

## 2017-08-29 RX ADMIN — FAMOTIDINE 20 MG: 20 TABLET ORAL at 08:57

## 2017-08-29 RX ADMIN — MUPIROCIN CALCIUM: 20 CREAM TOPICAL at 10:47

## 2017-08-29 RX ADMIN — METHYLPREDNISOLONE SODIUM SUCCINATE 40 MG: 40 INJECTION, POWDER, FOR SOLUTION INTRAMUSCULAR; INTRAVENOUS at 22:32

## 2017-08-29 RX ADMIN — INSULIN HUMAN 80 UNITS: 100 INJECTION, SUSPENSION SUBCUTANEOUS at 18:00

## 2017-08-29 RX ADMIN — MUPIROCIN CALCIUM: 20 CREAM TOPICAL at 23:23

## 2017-08-30 LAB
GLUCOSE BLD-MCNC: 291 MG/DL (ref 60–115)
GLUCOSE BLD-MCNC: 424 MG/DL (ref 60–115)
GLUCOSE BLD-MCNC: 445 MG/DL (ref 60–115)
GLUCOSE BLD-MCNC: 519 MG/DL (ref 60–115)
HBA1C MFR BLD: 12.3 % (ref 4.8–5.9)
PERFORMED ON: ABNORMAL

## 2017-08-30 PROCEDURE — 83036 HEMOGLOBIN GLYCOSYLATED A1C: CPT

## 2017-08-30 PROCEDURE — 6370000000 HC RX 637 (ALT 250 FOR IP): Performed by: FAMILY MEDICINE

## 2017-08-30 PROCEDURE — 6370000000 HC RX 637 (ALT 250 FOR IP): Performed by: INTERNAL MEDICINE

## 2017-08-30 PROCEDURE — 2580000003 HC RX 258: Performed by: NURSE PRACTITIONER

## 2017-08-30 PROCEDURE — 1210000000 HC MED SURG R&B

## 2017-08-30 PROCEDURE — 2580000003 HC RX 258: Performed by: INTERNAL MEDICINE

## 2017-08-30 PROCEDURE — 6360000002 HC RX W HCPCS: Performed by: NURSE PRACTITIONER

## 2017-08-30 PROCEDURE — 36415 COLL VENOUS BLD VENIPUNCTURE: CPT

## 2017-08-30 PROCEDURE — 6370000000 HC RX 637 (ALT 250 FOR IP): Performed by: NURSE PRACTITIONER

## 2017-08-30 RX ORDER — PREDNISONE 20 MG/1
40 TABLET ORAL 2 TIMES DAILY
Qty: 28 TABLET | Refills: 0 | Status: SHIPPED | OUTPATIENT
Start: 2017-08-30 | End: 2017-08-31 | Stop reason: HOSPADM

## 2017-08-30 RX ORDER — FAMOTIDINE 20 MG/1
20 TABLET, FILM COATED ORAL 2 TIMES DAILY
Qty: 30 TABLET | Refills: 0 | Status: SHIPPED | OUTPATIENT
Start: 2017-08-30

## 2017-08-30 RX ORDER — SODIUM CHLORIDE 9 MG/ML
INJECTION, SOLUTION INTRAVENOUS CONTINUOUS
Status: DISCONTINUED | OUTPATIENT
Start: 2017-08-30 | End: 2017-09-01 | Stop reason: HOSPADM

## 2017-08-30 RX ADMIN — PROPRANOLOL HYDROCHLORIDE 60 MG: 60 CAPSULE, EXTENDED RELEASE ORAL at 21:45

## 2017-08-30 RX ADMIN — INSULIN LISPRO 18 UNITS: 100 INJECTION, SOLUTION INTRAVENOUS; SUBCUTANEOUS at 17:57

## 2017-08-30 RX ADMIN — HEPARIN SODIUM 5000 UNITS: 5000 INJECTION, SOLUTION INTRAVENOUS; SUBCUTANEOUS at 23:28

## 2017-08-30 RX ADMIN — FAMOTIDINE 20 MG: 20 TABLET ORAL at 21:48

## 2017-08-30 RX ADMIN — FAMOTIDINE 20 MG: 20 TABLET ORAL at 08:39

## 2017-08-30 RX ADMIN — ASPIRIN 81 MG: 81 TABLET, COATED ORAL at 08:38

## 2017-08-30 RX ADMIN — MUPIROCIN CALCIUM: 20 CREAM TOPICAL at 10:38

## 2017-08-30 RX ADMIN — DIPHENHYDRAMINE HYDROCHLORIDE 50 MG: 50 INJECTION, SOLUTION INTRAMUSCULAR; INTRAVENOUS at 02:46

## 2017-08-30 RX ADMIN — SODIUM CHLORIDE, PRESERVATIVE FREE 10 ML: 5 INJECTION INTRAVENOUS at 23:32

## 2017-08-30 RX ADMIN — HEPARIN SODIUM 5000 UNITS: 5000 INJECTION, SOLUTION INTRAVENOUS; SUBCUTANEOUS at 17:57

## 2017-08-30 RX ADMIN — INSULIN LISPRO 9 UNITS: 100 INJECTION, SOLUTION INTRAVENOUS; SUBCUTANEOUS at 08:41

## 2017-08-30 RX ADMIN — PREDNISONE 30 MG: 20 TABLET ORAL at 21:45

## 2017-08-30 RX ADMIN — PRIMIDONE 50 MG: 50 TABLET ORAL at 21:47

## 2017-08-30 RX ADMIN — MUPIROCIN CALCIUM: 20 CREAM TOPICAL at 23:31

## 2017-08-30 RX ADMIN — FOLIC ACID 1 MG: 1 TABLET ORAL at 08:38

## 2017-08-30 RX ADMIN — ISOSORBIDE MONONITRATE 30 MG: 30 TABLET, EXTENDED RELEASE ORAL at 08:38

## 2017-08-30 RX ADMIN — INSULIN HUMAN 80 UNITS: 100 INJECTION, SUSPENSION SUBCUTANEOUS at 08:40

## 2017-08-30 RX ADMIN — METHYLPREDNISOLONE SODIUM SUCCINATE 40 MG: 40 INJECTION, POWDER, FOR SOLUTION INTRAMUSCULAR; INTRAVENOUS at 13:48

## 2017-08-30 RX ADMIN — POLYETHYLENE GLYCOL 3350 17 G: 17 POWDER, FOR SOLUTION ORAL at 08:39

## 2017-08-30 RX ADMIN — DOXEPIN HYDROCHLORIDE 200 MG: 100 CAPSULE ORAL at 21:44

## 2017-08-30 RX ADMIN — PROPRANOLOL HYDROCHLORIDE 60 MG: 60 CAPSULE, EXTENDED RELEASE ORAL at 08:38

## 2017-08-30 RX ADMIN — DIPHENHYDRAMINE HYDROCHLORIDE 50 MG: 50 INJECTION, SOLUTION INTRAMUSCULAR; INTRAVENOUS at 13:50

## 2017-08-30 RX ADMIN — DIPHENHYDRAMINE HYDROCHLORIDE 50 MG: 50 INJECTION, SOLUTION INTRAMUSCULAR; INTRAVENOUS at 21:39

## 2017-08-30 RX ADMIN — SODIUM CHLORIDE, PRESERVATIVE FREE 10 ML: 5 INJECTION INTRAVENOUS at 10:38

## 2017-08-30 RX ADMIN — SODIUM CHLORIDE: 9 INJECTION, SOLUTION INTRAVENOUS at 15:42

## 2017-08-30 RX ADMIN — METHYLPREDNISOLONE SODIUM SUCCINATE 40 MG: 40 INJECTION, POWDER, FOR SOLUTION INTRAMUSCULAR; INTRAVENOUS at 06:12

## 2017-08-30 RX ADMIN — INSULIN LISPRO 18 UNITS: 100 INJECTION, SOLUTION INTRAVENOUS; SUBCUTANEOUS at 12:45

## 2017-08-30 RX ADMIN — LEVOTHYROXINE SODIUM 150 MCG: 150 TABLET ORAL at 05:59

## 2017-08-30 RX ADMIN — HEPARIN SODIUM 5000 UNITS: 5000 INJECTION, SOLUTION INTRAVENOUS; SUBCUTANEOUS at 08:39

## 2017-08-30 RX ADMIN — INSULIN HUMAN 80 UNITS: 100 INJECTION, SUSPENSION SUBCUTANEOUS at 17:58

## 2017-08-31 ENCOUNTER — APPOINTMENT (OUTPATIENT)
Dept: CT IMAGING | Age: 72
DRG: 607 | End: 2017-08-31
Attending: INTERNAL MEDICINE
Payer: MEDICARE

## 2017-08-31 ENCOUNTER — APPOINTMENT (OUTPATIENT)
Dept: MRI IMAGING | Age: 72
DRG: 607 | End: 2017-08-31
Attending: INTERNAL MEDICINE
Payer: MEDICARE

## 2017-08-31 LAB
ANION GAP SERPL CALCULATED.3IONS-SCNC: 14 MEQ/L (ref 7–13)
BASOPHILS ABSOLUTE: 0 K/UL (ref 0–0.2)
BASOPHILS RELATIVE PERCENT: 0.2 %
BUN BLDV-MCNC: 24 MG/DL (ref 8–23)
CALCIUM SERPL-MCNC: 9 MG/DL (ref 8.6–10.2)
CHLORIDE BLD-SCNC: 102 MEQ/L (ref 98–107)
CO2: 21 MEQ/L (ref 22–29)
CREAT SERPL-MCNC: 0.82 MG/DL (ref 0.5–0.9)
EOSINOPHILS ABSOLUTE: 0 K/UL (ref 0–0.7)
EOSINOPHILS RELATIVE PERCENT: 0.1 %
GFR AFRICAN AMERICAN: >60
GFR NON-AFRICAN AMERICAN: >60
GLUCOSE BLD-MCNC: 157 MG/DL (ref 74–109)
GLUCOSE BLD-MCNC: 221 MG/DL (ref 60–115)
GLUCOSE BLD-MCNC: 286 MG/DL (ref 60–115)
GLUCOSE BLD-MCNC: 345 MG/DL (ref 60–115)
GLUCOSE BLD-MCNC: 371 MG/DL (ref 60–115)
HCT VFR BLD CALC: 37.1 % (ref 37–47)
HEMOGLOBIN: 12 G/DL (ref 12–16)
LYMPHOCYTES ABSOLUTE: 1.5 K/UL (ref 1–4.8)
LYMPHOCYTES RELATIVE PERCENT: 10.6 %
MAGNESIUM: 2.1 MG/DL (ref 1.7–2.3)
MCH RBC QN AUTO: 27.4 PG (ref 27–31.3)
MCHC RBC AUTO-ENTMCNC: 32.4 % (ref 33–37)
MCV RBC AUTO: 84.4 FL (ref 82–100)
MONOCYTES ABSOLUTE: 1.1 K/UL (ref 0.2–0.8)
MONOCYTES RELATIVE PERCENT: 7.7 %
NEUTROPHILS ABSOLUTE: 11.3 K/UL (ref 1.4–6.5)
NEUTROPHILS RELATIVE PERCENT: 81.4 %
PDW BLD-RTO: 16 % (ref 11.5–14.5)
PERFORMED ON: ABNORMAL
PLATELET # BLD: 232 K/UL (ref 130–400)
POTASSIUM SERPL-SCNC: 4.3 MEQ/L (ref 3.5–5.1)
RBC # BLD: 4.4 M/UL (ref 4.2–5.4)
SODIUM BLD-SCNC: 137 MEQ/L (ref 132–144)
WBC # BLD: 13.9 K/UL (ref 4.8–10.8)

## 2017-08-31 PROCEDURE — 70470 CT HEAD/BRAIN W/O & W/DYE: CPT

## 2017-08-31 PROCEDURE — 6370000000 HC RX 637 (ALT 250 FOR IP): Performed by: INTERNAL MEDICINE

## 2017-08-31 PROCEDURE — 6360000004 HC RX CONTRAST MEDICATION: Performed by: RADIOLOGY

## 2017-08-31 PROCEDURE — 80048 BASIC METABOLIC PNL TOTAL CA: CPT

## 2017-08-31 PROCEDURE — 6360000002 HC RX W HCPCS: Performed by: NURSE PRACTITIONER

## 2017-08-31 PROCEDURE — 93005 ELECTROCARDIOGRAM TRACING: CPT

## 2017-08-31 PROCEDURE — 36415 COLL VENOUS BLD VENIPUNCTURE: CPT

## 2017-08-31 PROCEDURE — 1210000000 HC MED SURG R&B

## 2017-08-31 PROCEDURE — 83735 ASSAY OF MAGNESIUM: CPT

## 2017-08-31 PROCEDURE — 2580000003 HC RX 258: Performed by: INTERNAL MEDICINE

## 2017-08-31 PROCEDURE — 6370000000 HC RX 637 (ALT 250 FOR IP): Performed by: FAMILY MEDICINE

## 2017-08-31 PROCEDURE — 6370000000 HC RX 637 (ALT 250 FOR IP): Performed by: SPECIALIST

## 2017-08-31 PROCEDURE — 2580000003 HC RX 258: Performed by: NURSE PRACTITIONER

## 2017-08-31 PROCEDURE — 85025 COMPLETE CBC W/AUTO DIFF WBC: CPT

## 2017-08-31 PROCEDURE — 6370000000 HC RX 637 (ALT 250 FOR IP): Performed by: NURSE PRACTITIONER

## 2017-08-31 PROCEDURE — 70553 MRI BRAIN STEM W/O & W/DYE: CPT

## 2017-08-31 PROCEDURE — A9577 INJ MULTIHANCE: HCPCS | Performed by: RADIOLOGY

## 2017-08-31 PROCEDURE — 6370000000 HC RX 637 (ALT 250 FOR IP): Performed by: PSYCHIATRY & NEUROLOGY

## 2017-08-31 RX ORDER — LEVETIRACETAM 250 MG/1
250 TABLET ORAL 2 TIMES DAILY
Status: DISCONTINUED | OUTPATIENT
Start: 2017-08-31 | End: 2017-09-01 | Stop reason: HOSPADM

## 2017-08-31 RX ORDER — PREDNISONE 20 MG/1
20 TABLET ORAL 2 TIMES DAILY
Status: DISCONTINUED | OUTPATIENT
Start: 2017-08-31 | End: 2017-08-31 | Stop reason: CLARIF

## 2017-08-31 RX ORDER — PREDNISONE 20 MG/1
20 TABLET ORAL 2 TIMES DAILY
Status: DISCONTINUED | OUTPATIENT
Start: 2017-08-31 | End: 2017-09-01 | Stop reason: HOSPADM

## 2017-08-31 RX ORDER — PREDNISONE 10 MG/1
10 TABLET ORAL 2 TIMES DAILY
Status: DISCONTINUED | OUTPATIENT
Start: 2017-09-07 | End: 2017-09-01 | Stop reason: HOSPADM

## 2017-08-31 RX ADMIN — PREDNISONE 20 MG: 20 TABLET ORAL at 21:49

## 2017-08-31 RX ADMIN — FOLIC ACID 1 MG: 1 TABLET ORAL at 09:24

## 2017-08-31 RX ADMIN — DOXEPIN HYDROCHLORIDE 200 MG: 100 CAPSULE ORAL at 21:50

## 2017-08-31 RX ADMIN — FAMOTIDINE 20 MG: 20 TABLET ORAL at 09:23

## 2017-08-31 RX ADMIN — SODIUM CHLORIDE: 9 INJECTION, SOLUTION INTRAVENOUS at 12:34

## 2017-08-31 RX ADMIN — PROPRANOLOL HYDROCHLORIDE 60 MG: 60 CAPSULE, EXTENDED RELEASE ORAL at 09:24

## 2017-08-31 RX ADMIN — DIPHENHYDRAMINE HYDROCHLORIDE 50 MG: 50 INJECTION, SOLUTION INTRAMUSCULAR; INTRAVENOUS at 21:45

## 2017-08-31 RX ADMIN — HEPARIN SODIUM 5000 UNITS: 5000 INJECTION, SOLUTION INTRAVENOUS; SUBCUTANEOUS at 09:23

## 2017-08-31 RX ADMIN — INSULIN HUMAN 80 UNITS: 100 INJECTION, SUSPENSION SUBCUTANEOUS at 09:22

## 2017-08-31 RX ADMIN — MUPIROCIN CALCIUM: 20 CREAM TOPICAL at 10:55

## 2017-08-31 RX ADMIN — MUPIROCIN CALCIUM: 20 CREAM TOPICAL at 21:58

## 2017-08-31 RX ADMIN — PRIMIDONE 50 MG: 50 TABLET ORAL at 21:48

## 2017-08-31 RX ADMIN — ACETAMINOPHEN 650 MG: 325 TABLET ORAL at 21:45

## 2017-08-31 RX ADMIN — SODIUM CHLORIDE, PRESERVATIVE FREE 10 ML: 5 INJECTION INTRAVENOUS at 21:50

## 2017-08-31 RX ADMIN — PROPRANOLOL HYDROCHLORIDE 60 MG: 60 CAPSULE, EXTENDED RELEASE ORAL at 21:48

## 2017-08-31 RX ADMIN — GADOBENATE DIMEGLUMINE 20 ML: 529 INJECTION, SOLUTION INTRAVENOUS at 17:06

## 2017-08-31 RX ADMIN — LINACLOTIDE 145 MCG: 145 CAPSULE, GELATIN COATED ORAL at 05:58

## 2017-08-31 RX ADMIN — INSULIN LISPRO 6 UNITS: 100 INJECTION, SOLUTION INTRAVENOUS; SUBCUTANEOUS at 09:23

## 2017-08-31 RX ADMIN — INSULIN LISPRO 15 UNITS: 100 INJECTION, SOLUTION INTRAVENOUS; SUBCUTANEOUS at 17:30

## 2017-08-31 RX ADMIN — INSULIN LISPRO 9 UNITS: 100 INJECTION, SOLUTION INTRAVENOUS; SUBCUTANEOUS at 12:34

## 2017-08-31 RX ADMIN — INSULIN HUMAN 80 UNITS: 100 INJECTION, SUSPENSION SUBCUTANEOUS at 17:30

## 2017-08-31 RX ADMIN — ISOSORBIDE MONONITRATE 30 MG: 30 TABLET, EXTENDED RELEASE ORAL at 09:23

## 2017-08-31 RX ADMIN — PREDNISONE 30 MG: 20 TABLET ORAL at 09:23

## 2017-08-31 RX ADMIN — FAMOTIDINE 20 MG: 20 TABLET ORAL at 21:48

## 2017-08-31 RX ADMIN — LEVOTHYROXINE SODIUM 150 MCG: 150 TABLET ORAL at 05:58

## 2017-08-31 RX ADMIN — LEVETIRACETAM 250 MG: 250 TABLET, FILM COATED ORAL at 21:49

## 2017-08-31 RX ADMIN — DIPHENHYDRAMINE HYDROCHLORIDE 50 MG: 50 INJECTION, SOLUTION INTRAMUSCULAR; INTRAVENOUS at 05:58

## 2017-08-31 RX ADMIN — PRAVASTATIN SODIUM 80 MG: 80 TABLET ORAL at 09:24

## 2017-08-31 RX ADMIN — ASPIRIN 81 MG: 81 TABLET, COATED ORAL at 09:24

## 2017-08-31 RX ADMIN — HEPARIN SODIUM 5000 UNITS: 5000 INJECTION, SOLUTION INTRAVENOUS; SUBCUTANEOUS at 17:30

## 2017-08-31 RX ADMIN — IOPAMIDOL 50 ML: 755 INJECTION, SOLUTION INTRAVENOUS at 08:27

## 2017-08-31 RX ADMIN — POLYETHYLENE GLYCOL 3350 17 G: 17 POWDER, FOR SOLUTION ORAL at 09:23

## 2017-08-31 ASSESSMENT — PAIN SCALES - GENERAL: PAINLEVEL_OUTOF10: 7

## 2017-09-01 VITALS
HEART RATE: 70 BPM | HEIGHT: 68 IN | OXYGEN SATURATION: 98 % | RESPIRATION RATE: 18 BRPM | DIASTOLIC BLOOD PRESSURE: 63 MMHG | WEIGHT: 233.69 LBS | BODY MASS INDEX: 35.42 KG/M2 | TEMPERATURE: 97.2 F | SYSTOLIC BLOOD PRESSURE: 139 MMHG

## 2017-09-01 LAB
GLUCOSE BLD-MCNC: 149 MG/DL (ref 60–115)
GLUCOSE BLD-MCNC: 183 MG/DL (ref 60–115)
GLUCOSE BLD-MCNC: 308 MG/DL (ref 60–115)
PERFORMED ON: ABNORMAL

## 2017-09-01 PROCEDURE — 6370000000 HC RX 637 (ALT 250 FOR IP): Performed by: SPECIALIST

## 2017-09-01 PROCEDURE — 6370000000 HC RX 637 (ALT 250 FOR IP): Performed by: NURSE PRACTITIONER

## 2017-09-01 PROCEDURE — 6370000000 HC RX 637 (ALT 250 FOR IP): Performed by: INTERNAL MEDICINE

## 2017-09-01 PROCEDURE — 2580000003 HC RX 258: Performed by: INTERNAL MEDICINE

## 2017-09-01 PROCEDURE — 6370000000 HC RX 637 (ALT 250 FOR IP): Performed by: PSYCHIATRY & NEUROLOGY

## 2017-09-01 PROCEDURE — 6360000002 HC RX W HCPCS: Performed by: NURSE PRACTITIONER

## 2017-09-01 PROCEDURE — 95816 EEG AWAKE AND DROWSY: CPT

## 2017-09-01 PROCEDURE — 6370000000 HC RX 637 (ALT 250 FOR IP): Performed by: FAMILY MEDICINE

## 2017-09-01 PROCEDURE — 93010 ELECTROCARDIOGRAM REPORT: CPT | Performed by: INTERNAL MEDICINE

## 2017-09-01 RX ORDER — LEVETIRACETAM 250 MG/1
250 TABLET ORAL 2 TIMES DAILY
Qty: 60 TABLET | Refills: 0 | Status: SHIPPED | OUTPATIENT
Start: 2017-09-01

## 2017-09-01 RX ORDER — PREDNISONE 20 MG/1
20 TABLET ORAL 2 TIMES DAILY
Qty: 20 TABLET | Refills: 0 | Status: SHIPPED | OUTPATIENT
Start: 2017-09-01 | End: 2017-09-11

## 2017-09-01 RX ORDER — PREDNISONE 10 MG/1
10 TABLET ORAL 2 TIMES DAILY
Qty: 20 TABLET | Refills: 0 | Status: SHIPPED | OUTPATIENT
Start: 2017-09-07 | End: 2017-09-17

## 2017-09-01 RX ADMIN — LINACLOTIDE 145 MCG: 145 CAPSULE, GELATIN COATED ORAL at 09:31

## 2017-09-01 RX ADMIN — PROPRANOLOL HYDROCHLORIDE 60 MG: 60 CAPSULE, EXTENDED RELEASE ORAL at 09:15

## 2017-09-01 RX ADMIN — PREDNISONE 20 MG: 20 TABLET ORAL at 09:15

## 2017-09-01 RX ADMIN — FAMOTIDINE 20 MG: 20 TABLET ORAL at 09:15

## 2017-09-01 RX ADMIN — POLYETHYLENE GLYCOL 3350 17 G: 17 POWDER, FOR SOLUTION ORAL at 09:14

## 2017-09-01 RX ADMIN — LEVETIRACETAM 250 MG: 250 TABLET, FILM COATED ORAL at 09:15

## 2017-09-01 RX ADMIN — INSULIN HUMAN 80 UNITS: 100 INJECTION, SUSPENSION SUBCUTANEOUS at 17:39

## 2017-09-01 RX ADMIN — ISOSORBIDE MONONITRATE 30 MG: 30 TABLET, EXTENDED RELEASE ORAL at 09:15

## 2017-09-01 RX ADMIN — HEPARIN SODIUM 5000 UNITS: 5000 INJECTION, SOLUTION INTRAVENOUS; SUBCUTANEOUS at 17:39

## 2017-09-01 RX ADMIN — DIPHENHYDRAMINE HYDROCHLORIDE 50 MG: 50 INJECTION, SOLUTION INTRAMUSCULAR; INTRAVENOUS at 09:14

## 2017-09-01 RX ADMIN — HEPARIN SODIUM 5000 UNITS: 5000 INJECTION, SOLUTION INTRAVENOUS; SUBCUTANEOUS at 00:45

## 2017-09-01 RX ADMIN — INSULIN LISPRO 3 UNITS: 100 INJECTION, SOLUTION INTRAVENOUS; SUBCUTANEOUS at 12:34

## 2017-09-01 RX ADMIN — INSULIN LISPRO 12 UNITS: 100 INJECTION, SOLUTION INTRAVENOUS; SUBCUTANEOUS at 17:39

## 2017-09-01 RX ADMIN — LEVOTHYROXINE SODIUM 150 MCG: 150 TABLET ORAL at 06:00

## 2017-09-01 RX ADMIN — INSULIN HUMAN 80 UNITS: 100 INJECTION, SUSPENSION SUBCUTANEOUS at 10:55

## 2017-09-01 RX ADMIN — FOLIC ACID 1 MG: 1 TABLET ORAL at 09:15

## 2017-09-01 RX ADMIN — MUPIROCIN CALCIUM: 20 CREAM TOPICAL at 09:31

## 2017-09-01 RX ADMIN — ASPIRIN 81 MG: 81 TABLET, COATED ORAL at 09:15

## 2017-09-01 RX ADMIN — SODIUM CHLORIDE: 9 INJECTION, SOLUTION INTRAVENOUS at 03:00

## 2017-09-01 RX ADMIN — PRAVASTATIN SODIUM 80 MG: 80 TABLET ORAL at 09:15

## 2017-09-01 RX ADMIN — INSULIN LISPRO 3 UNITS: 100 INJECTION, SOLUTION INTRAVENOUS; SUBCUTANEOUS at 09:14

## 2017-09-01 RX ADMIN — HEPARIN SODIUM 5000 UNITS: 5000 INJECTION, SOLUTION INTRAVENOUS; SUBCUTANEOUS at 09:14

## 2017-09-05 LAB
EKG ATRIAL RATE: 68 BPM
EKG P AXIS: 27 DEGREES
EKG P-R INTERVAL: 156 MS
EKG Q-T INTERVAL: 406 MS
EKG QRS DURATION: 82 MS
EKG QTC CALCULATION (BAZETT): 431 MS
EKG R AXIS: -3 DEGREES
EKG T AXIS: 37 DEGREES
EKG VENTRICULAR RATE: 68 BPM

## 2017-09-12 LAB
ALBUMIN SERPL-MCNC: 3.6 G/DL (ref 3.9–4.9)
ALP BLD-CCNC: 146 U/L (ref 40–130)
ALT SERPL-CCNC: 22 U/L (ref 0–33)
ANION GAP SERPL CALCULATED.3IONS-SCNC: 15 MEQ/L (ref 7–13)
AST SERPL-CCNC: 13 U/L (ref 0–35)
BILIRUB SERPL-MCNC: 0.4 MG/DL (ref 0–1.2)
BUN BLDV-MCNC: 29 MG/DL (ref 8–23)
CALCIUM SERPL-MCNC: 9.7 MG/DL (ref 8.6–10.2)
CHLORIDE BLD-SCNC: 90 MEQ/L (ref 98–107)
CO2: 25 MEQ/L (ref 22–29)
CREAT SERPL-MCNC: 1.19 MG/DL (ref 0.5–0.9)
GFR AFRICAN AMERICAN: 54
GFR NON-AFRICAN AMERICAN: 44.6
GLOBULIN: 2.9 G/DL (ref 2.3–3.5)
GLUCOSE BLD-MCNC: 476 MG/DL (ref 74–109)
POTASSIUM SERPL-SCNC: 3.9 MEQ/L (ref 3.5–5.1)
SODIUM BLD-SCNC: 130 MEQ/L (ref 132–144)
TOTAL PROTEIN: 6.5 G/DL (ref 6.4–8.1)

## 2017-09-21 ENCOUNTER — OFFICE VISIT (OUTPATIENT)
Dept: SURGERY | Age: 72
End: 2017-09-21

## 2017-09-21 VITALS
WEIGHT: 240.6 LBS | OXYGEN SATURATION: 97 % | HEART RATE: 67 BPM | HEIGHT: 68 IN | BODY MASS INDEX: 36.46 KG/M2 | DIASTOLIC BLOOD PRESSURE: 69 MMHG | SYSTOLIC BLOOD PRESSURE: 116 MMHG | RESPIRATION RATE: 16 BRPM

## 2017-09-21 DIAGNOSIS — E11.65 TYPE 2 DIABETES MELLITUS WITH HYPERGLYCEMIA, WITHOUT LONG-TERM CURRENT USE OF INSULIN (HCC): Primary | ICD-10-CM

## 2017-09-21 LAB — GLUCOSE BLD-MCNC: 444 MG/DL

## 2017-09-21 PROCEDURE — 99213 OFFICE O/P EST LOW 20 MIN: CPT | Performed by: INTERNAL MEDICINE

## 2017-09-21 PROCEDURE — 82962 GLUCOSE BLOOD TEST: CPT | Performed by: INTERNAL MEDICINE

## 2017-09-21 ASSESSMENT — ENCOUNTER SYMPTOMS: SHORTNESS OF BREATH: 1

## 2017-11-07 ENCOUNTER — HOSPITAL ENCOUNTER (OUTPATIENT)
Dept: CT IMAGING | Age: 72
Discharge: HOME | End: 2017-11-07
Payer: MEDICARE

## 2017-11-07 DIAGNOSIS — C34.90 MALIGNANT NEOPLASM OF LUNG, UNSPECIFIED LATERALITY, UNSPECIFIED PART OF LUNG (HCC): ICD-10-CM

## 2017-11-07 PROCEDURE — 2500000003 HC RX 250 WO HCPCS: Performed by: RADIOLOGY

## 2017-11-07 PROCEDURE — 6360000004 HC RX CONTRAST MEDICATION: Performed by: RADIOLOGY

## 2017-11-07 PROCEDURE — 74177 CT ABD & PELVIS W/CONTRAST: CPT

## 2017-11-07 PROCEDURE — 71260 CT THORAX DX C+: CPT

## 2017-11-07 RX ADMIN — BARIUM SULFATE 450 ML: 21 SUSPENSION ORAL at 16:15

## 2017-11-07 RX ADMIN — IOPAMIDOL 100 ML: 755 INJECTION, SOLUTION INTRAVENOUS at 16:14

## 2017-12-22 ENCOUNTER — OFFICE VISIT (OUTPATIENT)
Dept: SURGERY | Age: 72
End: 2017-12-22

## 2017-12-22 VITALS — DIASTOLIC BLOOD PRESSURE: 64 MMHG | SYSTOLIC BLOOD PRESSURE: 99 MMHG | HEART RATE: 92 BPM

## 2017-12-22 LAB
GLUCOSE BLD-MCNC: 256 MG/DL
HBA1C MFR BLD: 12.1 %

## 2017-12-22 PROCEDURE — 83036 HEMOGLOBIN GLYCOSYLATED A1C: CPT | Performed by: INTERNAL MEDICINE

## 2017-12-22 PROCEDURE — 99213 OFFICE O/P EST LOW 20 MIN: CPT | Performed by: INTERNAL MEDICINE

## 2017-12-22 PROCEDURE — 82962 GLUCOSE BLOOD TEST: CPT | Performed by: INTERNAL MEDICINE

## 2017-12-28 NOTE — PROGRESS NOTES
daily, Disp: 30 tablet, Rfl: 0    insulin NPH (NOVOLIN N) 100 UNIT/ML injection vial, Inject 80 Units into the skin 2 times daily (before meals), Disp: 6 vial, Rfl: 3    Insulin Syringe-Needle U-100 ([employer] INSULIN SYR 1CC/30G) 30G X 5/16\" 1 ML MISC, 1 each by Does not apply route daily, Disp: 100 each, Rfl: 3    aspirin EC 81 MG EC tablet, Take 1 tablet by mouth daily, Disp: 30 tablet, Rfl: 3    warfarin (COUMADIN) 2.5 MG tablet, Take 2.5 mg (one tab) today and 5mg (2 tabs on Thursday) and then pt is to f/u with Dr. Yosef Johns for further orders (Patient taking differently: Take 2.5 mg by mouth daily Indications: 5mg on wed and sat, 2.5mg on all other days Take 2.5 mg (one tab) today and 5mg (2 tabs on Thursday) and then pt is to f/u with Dr. Yosef Johns for further orders), Disp: 60 tablet, Rfl: 5    doxepin (SINEQUAN) 100 MG capsule, Take 200 mg by mouth nightly, Disp: , Rfl:     folic acid (FOLVITE) 1 MG tablet, Take 1 mg by mouth daily, Disp: , Rfl:     isosorbide mononitrate (IMDUR) 30 MG extended release tablet, Take 30 mg by mouth daily, Disp: , Rfl:     levothyroxine (SYNTHROID) 150 MCG tablet, Take 150 mcg by mouth Daily, Disp: , Rfl:     nitroGLYCERIN (NITROSTAT) 0.4 MG SL tablet, Place 0.4 mg under the tongue every 5 minutes as needed for Chest pain, Disp: , Rfl:     pravastatin (PRAVACHOL) 80 MG tablet, Take 80 mg by mouth daily, Disp: , Rfl:     primidone (MYSOLINE) 50 MG tablet, Take 50 mg by mouth nightly, Disp: , Rfl:     albuterol sulfate  (90 BASE) MCG/ACT inhaler, Inhale 2 puffs into the lungs every 6 hours as needed for Wheezing, Disp: , Rfl:     propranolol (INDERAL LA) 60 MG extended release capsule, Take 60 mg by mouth daily, Disp: , Rfl:       Review of Systems   Constitutional: Positive for fatigue. Neurological: Positive for dizziness. Psychiatric/Behavioral: Positive for confusion. All other systems reviewed and are negative.     Vitals:    12/22/17 1500   BP: 99/64 Site: Left Arm   Position: Sitting   Cuff Size: Large Adult   Pulse: 92       Objective:   Physical Exam   Constitutional: She appears well-developed and well-nourished. HENT:   Head: Normocephalic and atraumatic. Eyes: Conjunctivae are normal.   Neck: Neck supple. Cardiovascular: Normal rate. Abdominal:   Obese    Musculoskeletal: Normal range of motion. Neurological: She is alert. Psychiatric: Her mood appears anxious. Chemistry        Component Value Date/Time     2017    K 4.3 2017    CL 98 2017 170    CO2 26 2017 1706    BUN 14 2017    CREATININE 1.05 (H) 2017        Component Value Date/Time    CALCIUM 9.1 2017    ALKPHOS 128 2017    AST 14 2017    ALT 13 2017 170    BILITOT 0.4 2017          Assessment:      1.  Type 2 diabetes mellitus with hyperglycemia, without long-term current use of insulin ([facility])  POCT Glucose    POCT glycosylated hemoglobin (Hb A1C)    Basic Metabolic Panel    Hemoglobin A1C           Plan:        Orders Placed This Encounter   Procedures    Basic Metabolic Panel     Standing Status:   Future     Standing Expiration Date:   2018    Hemoglobin A1C     Standing Status:   Future     Standing Expiration Date:   2018    POCT Glucose    POCT glycosylated hemoglobin (Hb A1C)     Continue Humulin and 80 units twice a day  Increase regular insulin dose  Orders Placed This Encounter   Medications    insulin regular (HUMULIN R) 100 UNIT/ML injection     Si units at each meals     Dispense:  2 vial     Refill:  3

## 2018-03-02 ENCOUNTER — HOSPITAL ENCOUNTER (INPATIENT)
Age: 73
LOS: 6 days | Discharge: HOME HEALTH | DRG: 181 | End: 2018-03-09
Attending: INTERNAL MEDICINE | Admitting: HOSPITALIST
Payer: MEDICARE

## 2018-03-02 ENCOUNTER — APPOINTMENT (OUTPATIENT)
Dept: CT IMAGING | Age: 73
DRG: 181 | End: 2018-03-02
Payer: MEDICARE

## 2018-03-02 DIAGNOSIS — R07.9 CHEST PAIN, UNSPECIFIED TYPE: Primary | ICD-10-CM

## 2018-03-02 PROBLEM — E87.1 HYPONATREMIA: Status: RESOLVED | Noted: 2017-05-21 | Resolved: 2018-03-02

## 2018-03-02 PROBLEM — K92.2 GI BLEED: Status: RESOLVED | Noted: 2017-05-21 | Resolved: 2018-03-02

## 2018-03-02 PROBLEM — D72.819 LEUKOPENIA: Status: RESOLVED | Noted: 2017-05-21 | Resolved: 2018-03-02

## 2018-03-02 PROBLEM — R73.9 HYPERGLYCEMIA: Status: RESOLVED | Noted: 2017-05-21 | Resolved: 2018-03-02

## 2018-03-02 LAB
ALBUMIN SERPL-MCNC: 3.8 G/DL (ref 3.9–4.9)
ALP BLD-CCNC: 164 U/L (ref 40–130)
ALT SERPL-CCNC: 14 U/L (ref 0–33)
ANION GAP SERPL CALCULATED.3IONS-SCNC: 14 MEQ/L (ref 7–13)
AST SERPL-CCNC: 17 U/L (ref 0–35)
BASOPHILS ABSOLUTE: 0.1 K/UL (ref 0–0.2)
BASOPHILS RELATIVE PERCENT: 0.8 %
BILIRUB SERPL-MCNC: 0.3 MG/DL (ref 0–1.2)
BUN BLDV-MCNC: 17 MG/DL (ref 8–23)
CALCIUM SERPL-MCNC: 10 MG/DL (ref 8.6–10.2)
CHLORIDE BLD-SCNC: 93 MEQ/L (ref 98–107)
CO2: 25 MEQ/L (ref 22–29)
CREAT SERPL-MCNC: 0.96 MG/DL (ref 0.5–0.9)
EKG ATRIAL RATE: 75 BPM
EKG P AXIS: 33 DEGREES
EKG P-R INTERVAL: 182 MS
EKG Q-T INTERVAL: 400 MS
EKG QRS DURATION: 88 MS
EKG QTC CALCULATION (BAZETT): 446 MS
EKG R AXIS: 13 DEGREES
EKG T AXIS: 69 DEGREES
EKG VENTRICULAR RATE: 75 BPM
EOSINOPHILS ABSOLUTE: 0.1 K/UL (ref 0–0.7)
EOSINOPHILS RELATIVE PERCENT: 0.6 %
GFR AFRICAN AMERICAN: >60
GFR NON-AFRICAN AMERICAN: 57.1
GLOBULIN: 3.1 G/DL (ref 2.3–3.5)
GLUCOSE BLD-MCNC: 309 MG/DL (ref 74–109)
GLUCOSE BLD-MCNC: 416 MG/DL (ref 60–115)
HCT VFR BLD CALC: 38.6 % (ref 37–47)
HEMOGLOBIN: 12.9 G/DL (ref 12–16)
INR BLD: 1.3
LYMPHOCYTES ABSOLUTE: 1.4 K/UL (ref 1–4.8)
LYMPHOCYTES RELATIVE PERCENT: 12.2 %
MCH RBC QN AUTO: 26.4 PG (ref 27–31.3)
MCHC RBC AUTO-ENTMCNC: 33.4 % (ref 33–37)
MCV RBC AUTO: 79.3 FL (ref 82–100)
MONOCYTES ABSOLUTE: 0.8 K/UL (ref 0.2–0.8)
MONOCYTES RELATIVE PERCENT: 7.1 %
NEUTROPHILS ABSOLUTE: 9.2 K/UL (ref 1.4–6.5)
NEUTROPHILS RELATIVE PERCENT: 79.3 %
PDW BLD-RTO: 16.8 % (ref 11.5–14.5)
PERFORMED ON: ABNORMAL
PLATELET # BLD: 304 K/UL (ref 130–400)
POC CREATININE WHOLE BLOOD: 1
POTASSIUM SERPL-SCNC: 4.4 MEQ/L (ref 3.5–5.1)
PROTHROMBIN TIME: 13.3 SEC (ref 8.1–13.7)
RBC # BLD: 4.87 M/UL (ref 4.2–5.4)
SODIUM BLD-SCNC: 132 MEQ/L (ref 132–144)
TOTAL CK: 116 U/L (ref 0–170)
TOTAL PROTEIN: 6.9 G/DL (ref 6.4–8.1)
TROPONIN: <0.01 NG/ML (ref 0–0.01)
TROPONIN: <0.01 NG/ML (ref 0–0.01)
WBC # BLD: 11.6 K/UL (ref 4.8–10.8)

## 2018-03-02 PROCEDURE — 94664 DEMO&/EVAL PT USE INHALER: CPT

## 2018-03-02 PROCEDURE — 36415 COLL VENOUS BLD VENIPUNCTURE: CPT

## 2018-03-02 PROCEDURE — 6360000004 HC RX CONTRAST MEDICATION: Performed by: EMERGENCY MEDICINE

## 2018-03-02 PROCEDURE — 71275 CT ANGIOGRAPHY CHEST: CPT

## 2018-03-02 PROCEDURE — 6370000000 HC RX 637 (ALT 250 FOR IP): Performed by: INTERNAL MEDICINE

## 2018-03-02 PROCEDURE — 96375 TX/PRO/DX INJ NEW DRUG ADDON: CPT

## 2018-03-02 PROCEDURE — G0378 HOSPITAL OBSERVATION PER HR: HCPCS

## 2018-03-02 PROCEDURE — 6360000002 HC RX W HCPCS: Performed by: NURSE PRACTITIONER

## 2018-03-02 PROCEDURE — 6360000002 HC RX W HCPCS

## 2018-03-02 PROCEDURE — 99285 EMERGENCY DEPT VISIT HI MDM: CPT

## 2018-03-02 PROCEDURE — 96374 THER/PROPH/DIAG INJ IV PUSH: CPT

## 2018-03-02 PROCEDURE — 82550 ASSAY OF CK (CPK): CPT

## 2018-03-02 PROCEDURE — 85025 COMPLETE CBC W/AUTO DIFF WBC: CPT

## 2018-03-02 PROCEDURE — 93005 ELECTROCARDIOGRAM TRACING: CPT

## 2018-03-02 PROCEDURE — 6370000000 HC RX 637 (ALT 250 FOR IP): Performed by: NURSE PRACTITIONER

## 2018-03-02 PROCEDURE — 80053 COMPREHEN METABOLIC PANEL: CPT

## 2018-03-02 PROCEDURE — 84484 ASSAY OF TROPONIN QUANT: CPT

## 2018-03-02 PROCEDURE — 85610 PROTHROMBIN TIME: CPT

## 2018-03-02 PROCEDURE — 2580000003 HC RX 258: Performed by: INTERNAL MEDICINE

## 2018-03-02 PROCEDURE — 2580000003 HC RX 258: Performed by: EMERGENCY MEDICINE

## 2018-03-02 RX ORDER — OMEPRAZOLE 40 MG/1
40 CAPSULE, DELAYED RELEASE ORAL DAILY
COMMUNITY

## 2018-03-02 RX ORDER — HYDROXYZINE HYDROCHLORIDE 25 MG/1
25 TABLET, FILM COATED ORAL EVERY 8 HOURS PRN
COMMUNITY

## 2018-03-02 RX ORDER — ACETAMINOPHEN 325 MG/1
650 TABLET ORAL EVERY 4 HOURS PRN
Status: DISCONTINUED | OUTPATIENT
Start: 2018-03-02 | End: 2018-03-09 | Stop reason: HOSPADM

## 2018-03-02 RX ORDER — FUROSEMIDE 20 MG/1
20 TABLET ORAL DAILY
Status: DISCONTINUED | OUTPATIENT
Start: 2018-03-02 | End: 2018-03-08

## 2018-03-02 RX ORDER — MORPHINE SULFATE 4 MG/ML
4 INJECTION, SOLUTION INTRAMUSCULAR; INTRAVENOUS
Status: DISCONTINUED | OUTPATIENT
Start: 2018-03-02 | End: 2018-03-02

## 2018-03-02 RX ORDER — ONDANSETRON 2 MG/ML
INJECTION INTRAMUSCULAR; INTRAVENOUS
Status: COMPLETED
Start: 2018-03-02 | End: 2018-03-02

## 2018-03-02 RX ORDER — HYDROXYZINE HYDROCHLORIDE 25 MG/1
25 TABLET, FILM COATED ORAL EVERY 8 HOURS PRN
Status: DISCONTINUED | OUTPATIENT
Start: 2018-03-02 | End: 2018-03-09 | Stop reason: HOSPADM

## 2018-03-02 RX ORDER — NORTRIPTYLINE HYDROCHLORIDE 25 MG/1
25 CAPSULE ORAL NIGHTLY
Status: DISCONTINUED | OUTPATIENT
Start: 2018-03-02 | End: 2018-03-02

## 2018-03-02 RX ORDER — FUROSEMIDE 20 MG/1
20 TABLET ORAL DAILY
Status: ON HOLD | COMMUNITY
End: 2018-03-08 | Stop reason: HOSPADM

## 2018-03-02 RX ORDER — PRIMIDONE 50 MG/1
50 TABLET ORAL NIGHTLY
Status: DISCONTINUED | OUTPATIENT
Start: 2018-03-02 | End: 2018-03-09 | Stop reason: HOSPADM

## 2018-03-02 RX ORDER — WARFARIN SODIUM 2.5 MG/1
5 TABLET ORAL
Status: DISCONTINUED | OUTPATIENT
Start: 2018-03-03 | End: 2018-03-09 | Stop reason: HOSPADM

## 2018-03-02 RX ORDER — DEXTROSE MONOHYDRATE 25 G/50ML
12.5 INJECTION, SOLUTION INTRAVENOUS PRN
Status: DISCONTINUED | OUTPATIENT
Start: 2018-03-02 | End: 2018-03-09 | Stop reason: HOSPADM

## 2018-03-02 RX ORDER — NORTRIPTYLINE HYDROCHLORIDE 25 MG/1
25 CAPSULE ORAL NIGHTLY
COMMUNITY

## 2018-03-02 RX ORDER — NORTRIPTYLINE HYDROCHLORIDE 25 MG/1
75 CAPSULE ORAL NIGHTLY
Status: DISCONTINUED | OUTPATIENT
Start: 2018-03-02 | End: 2018-03-03 | Stop reason: CLARIF

## 2018-03-02 RX ORDER — SODIUM CHLORIDE 0.9 % (FLUSH) 0.9 %
10 SYRINGE (ML) INJECTION PRN
Status: DISCONTINUED | OUTPATIENT
Start: 2018-03-02 | End: 2018-03-02

## 2018-03-02 RX ORDER — METFORMIN HYDROCHLORIDE 500 MG/1
1000 TABLET, EXTENDED RELEASE ORAL 2 TIMES DAILY
Status: ON HOLD | COMMUNITY
End: 2018-05-29 | Stop reason: ALTCHOICE

## 2018-03-02 RX ORDER — ONDANSETRON 2 MG/ML
4 INJECTION INTRAMUSCULAR; INTRAVENOUS ONCE
Status: DISCONTINUED | OUTPATIENT
Start: 2018-03-02 | End: 2018-03-02

## 2018-03-02 RX ORDER — NICOTINE POLACRILEX 4 MG
15 LOZENGE BUCCAL PRN
Status: DISCONTINUED | OUTPATIENT
Start: 2018-03-02 | End: 2018-03-09 | Stop reason: HOSPADM

## 2018-03-02 RX ORDER — DEXTROSE MONOHYDRATE 50 MG/ML
100 INJECTION, SOLUTION INTRAVENOUS PRN
Status: DISCONTINUED | OUTPATIENT
Start: 2018-03-02 | End: 2018-03-09 | Stop reason: HOSPADM

## 2018-03-02 RX ORDER — DOCUSATE SODIUM 100 MG/1
100 CAPSULE, LIQUID FILLED ORAL 2 TIMES DAILY
Status: DISCONTINUED | OUTPATIENT
Start: 2018-03-02 | End: 2018-03-09 | Stop reason: HOSPADM

## 2018-03-02 RX ORDER — HYDROCHLOROTHIAZIDE 25 MG/1
25 TABLET ORAL DAILY
Status: DISCONTINUED | OUTPATIENT
Start: 2018-03-02 | End: 2018-03-09 | Stop reason: HOSPADM

## 2018-03-02 RX ORDER — TRAMADOL HYDROCHLORIDE 50 MG/1
100 TABLET ORAL EVERY 6 HOURS PRN
Status: DISCONTINUED | OUTPATIENT
Start: 2018-03-02 | End: 2018-03-09 | Stop reason: HOSPADM

## 2018-03-02 RX ORDER — DOXEPIN HYDROCHLORIDE 50 MG/1
150 CAPSULE ORAL NIGHTLY
Status: DISCONTINUED | OUTPATIENT
Start: 2018-03-02 | End: 2018-03-02

## 2018-03-02 RX ORDER — SODIUM CHLORIDE 0.9 % (FLUSH) 0.9 %
10 SYRINGE (ML) INJECTION PRN
Status: DISCONTINUED | OUTPATIENT
Start: 2018-03-02 | End: 2018-03-09 | Stop reason: HOSPADM

## 2018-03-02 RX ORDER — PANTOPRAZOLE SODIUM 40 MG/1
40 TABLET, DELAYED RELEASE ORAL
Status: DISCONTINUED | OUTPATIENT
Start: 2018-03-03 | End: 2018-03-09 | Stop reason: HOSPADM

## 2018-03-02 RX ORDER — MORPHINE SULFATE 2 MG/ML
2 INJECTION, SOLUTION INTRAMUSCULAR; INTRAVENOUS
Status: DISCONTINUED | OUTPATIENT
Start: 2018-03-02 | End: 2018-03-09 | Stop reason: HOSPADM

## 2018-03-02 RX ORDER — SODIUM CHLORIDE 0.9 % (FLUSH) 0.9 %
10 SYRINGE (ML) INJECTION EVERY 12 HOURS SCHEDULED
Status: DISCONTINUED | OUTPATIENT
Start: 2018-03-02 | End: 2018-03-09 | Stop reason: HOSPADM

## 2018-03-02 RX ORDER — POTASSIUM CHLORIDE 1.5 G/1.77G
10 POWDER, FOR SOLUTION ORAL DAILY
Status: DISCONTINUED | OUTPATIENT
Start: 2018-03-02 | End: 2018-03-09 | Stop reason: HOSPADM

## 2018-03-02 RX ORDER — ONDANSETRON 2 MG/ML
4 INJECTION INTRAMUSCULAR; INTRAVENOUS EVERY 6 HOURS PRN
Status: DISCONTINUED | OUTPATIENT
Start: 2018-03-02 | End: 2018-03-09 | Stop reason: HOSPADM

## 2018-03-02 RX ORDER — ASPIRIN 81 MG/1
324 TABLET, CHEWABLE ORAL ONCE
Status: COMPLETED | OUTPATIENT
Start: 2018-03-02 | End: 2018-03-02

## 2018-03-02 RX ORDER — NITROGLYCERIN 0.4 MG/1
0.4 TABLET SUBLINGUAL EVERY 5 MIN PRN
Status: DISCONTINUED | OUTPATIENT
Start: 2018-03-02 | End: 2018-03-09 | Stop reason: HOSPADM

## 2018-03-02 RX ORDER — LEVOTHYROXINE SODIUM 0.15 MG/1
150 TABLET ORAL DAILY
Status: DISCONTINUED | OUTPATIENT
Start: 2018-03-02 | End: 2018-03-09 | Stop reason: HOSPADM

## 2018-03-02 RX ORDER — ASPIRIN 81 MG/1
81 TABLET ORAL DAILY
Status: DISCONTINUED | OUTPATIENT
Start: 2018-03-02 | End: 2018-03-03

## 2018-03-02 RX ORDER — ISOSORBIDE MONONITRATE 30 MG/1
30 TABLET, EXTENDED RELEASE ORAL DAILY
Status: DISCONTINUED | OUTPATIENT
Start: 2018-03-02 | End: 2018-03-03

## 2018-03-02 RX ORDER — PRAVASTATIN SODIUM 80 MG/1
80 TABLET ORAL NIGHTLY
Status: DISCONTINUED | OUTPATIENT
Start: 2018-03-02 | End: 2018-03-09 | Stop reason: HOSPADM

## 2018-03-02 RX ORDER — PROPRANOLOL HCL 60 MG
60 CAPSULE, EXTENDED RELEASE 24HR ORAL 2 TIMES DAILY
Status: DISCONTINUED | OUTPATIENT
Start: 2018-03-02 | End: 2018-03-09 | Stop reason: HOSPADM

## 2018-03-02 RX ORDER — METFORMIN HYDROCHLORIDE 500 MG/1
1000 TABLET, EXTENDED RELEASE ORAL 2 TIMES DAILY
Status: DISCONTINUED | OUTPATIENT
Start: 2018-03-02 | End: 2018-03-02

## 2018-03-02 RX ORDER — HYDROCHLOROTHIAZIDE 25 MG/1
25 TABLET ORAL DAILY
COMMUNITY

## 2018-03-02 RX ORDER — WARFARIN SODIUM 2.5 MG/1
2.5 TABLET ORAL
Status: DISCONTINUED | OUTPATIENT
Start: 2018-03-02 | End: 2018-03-09 | Stop reason: HOSPADM

## 2018-03-02 RX ORDER — FOLIC ACID 1 MG/1
1 TABLET ORAL DAILY
Status: DISCONTINUED | OUTPATIENT
Start: 2018-03-02 | End: 2018-03-06

## 2018-03-02 RX ORDER — ALBUTEROL SULFATE 90 UG/1
2 AEROSOL, METERED RESPIRATORY (INHALATION) EVERY 4 HOURS PRN
Status: DISCONTINUED | OUTPATIENT
Start: 2018-03-02 | End: 2018-03-09 | Stop reason: HOSPADM

## 2018-03-02 RX ADMIN — PRIMIDONE 50 MG: 50 TABLET ORAL at 21:08

## 2018-03-02 RX ADMIN — INSULIN LISPRO 3 UNITS: 100 INJECTION, SOLUTION INTRAVENOUS; SUBCUTANEOUS at 22:04

## 2018-03-02 RX ADMIN — Medication 10 ML: at 21:08

## 2018-03-02 RX ADMIN — DOCUSATE SODIUM 100 MG: 100 CAPSULE, LIQUID FILLED ORAL at 21:07

## 2018-03-02 RX ADMIN — Medication 10 ML: at 13:38

## 2018-03-02 RX ADMIN — ONDANSETRON 4 MG: 2 INJECTION INTRAMUSCULAR; INTRAVENOUS at 14:59

## 2018-03-02 RX ADMIN — WARFARIN SODIUM 2.5 MG: 2.5 TABLET ORAL at 19:29

## 2018-03-02 RX ADMIN — MORPHINE SULFATE 4 MG: 4 INJECTION, SOLUTION INTRAMUSCULAR; INTRAVENOUS at 15:00

## 2018-03-02 RX ADMIN — IOPAMIDOL 100 ML: 755 INJECTION, SOLUTION INTRAVENOUS at 13:37

## 2018-03-02 RX ADMIN — NITROGLYCERIN 0.5 INCH: 20 OINTMENT TOPICAL at 12:58

## 2018-03-02 RX ADMIN — NORTRIPTYLINE HYDROCHLORIDE 75 MG: 25 CAPSULE ORAL at 21:07

## 2018-03-02 RX ADMIN — PROPRANOLOL HYDROCHLORIDE 60 MG: 60 CAPSULE, EXTENDED RELEASE ORAL at 22:54

## 2018-03-02 RX ADMIN — HYDROXYZINE HYDROCHLORIDE 25 MG: 25 TABLET ORAL at 21:07

## 2018-03-02 RX ADMIN — ASPIRIN 81 MG 324 MG: 81 TABLET ORAL at 12:44

## 2018-03-02 RX ADMIN — NITROGLYCERIN 0.4 MG: 0.4 TABLET SUBLINGUAL at 12:47

## 2018-03-02 RX ADMIN — PRAVASTATIN SODIUM 80 MG: 80 TABLET ORAL at 21:07

## 2018-03-02 ASSESSMENT — PAIN DESCRIPTION - DESCRIPTORS
DESCRIPTORS: SHARP
DESCRIPTORS: SHARP
DESCRIPTORS: CONSTANT
DESCRIPTORS: SHARP
DESCRIPTORS: SHARP;CONSTANT

## 2018-03-02 ASSESSMENT — PAIN DESCRIPTION - LOCATION
LOCATION: CHEST

## 2018-03-02 ASSESSMENT — PAIN DESCRIPTION - DIRECTION
RADIATING_TOWARDS: NECK
RADIATING_TOWARDS: NECK

## 2018-03-02 ASSESSMENT — PAIN DESCRIPTION - PAIN TYPE
TYPE: ACUTE PAIN

## 2018-03-02 ASSESSMENT — PAIN SCALES - GENERAL
PAINLEVEL_OUTOF10: 4
PAINLEVEL_OUTOF10: 10
PAINLEVEL_OUTOF10: 4
PAINLEVEL_OUTOF10: 1
PAINLEVEL_OUTOF10: 1
PAINLEVEL_OUTOF10: 4
PAINLEVEL_OUTOF10: 5
PAINLEVEL_OUTOF10: 3

## 2018-03-02 ASSESSMENT — ENCOUNTER SYMPTOMS
ABDOMINAL PAIN: 0
NAUSEA: 1
VOMITING: 0
COUGH: 0
SHORTNESS OF BREATH: 1
DIARRHEA: 0

## 2018-03-02 ASSESSMENT — PAIN DESCRIPTION - FREQUENCY
FREQUENCY: INTERMITTENT
FREQUENCY: CONTINUOUS
FREQUENCY: INTERMITTENT

## 2018-03-02 ASSESSMENT — PAIN DESCRIPTION - ONSET
ONSET: ON-GOING

## 2018-03-02 ASSESSMENT — PAIN SCALES - WONG BAKER
WONGBAKER_NUMERICALRESPONSE: 2
WONGBAKER_NUMERICALRESPONSE: 2

## 2018-03-02 ASSESSMENT — PAIN DESCRIPTION - ORIENTATION
ORIENTATION: LEFT
ORIENTATION: LEFT;UPPER

## 2018-03-02 ASSESSMENT — HEART SCORE: ECG: 0

## 2018-03-02 ASSESSMENT — PAIN DESCRIPTION - PROGRESSION
CLINICAL_PROGRESSION: GRADUALLY IMPROVING
CLINICAL_PROGRESSION: GRADUALLY IMPROVING

## 2018-03-02 NOTE — ED TRIAGE NOTES
A & Ox4. Skin pink warm and dry. States pain has been off and on x 5 days, steady since last night. States NTG helped some.  has been flying recently, doing her bucket list since stopping chemo.  gets SOB with exertion lately.

## 2018-03-02 NOTE — ED PROVIDER NOTES
(coronary artery disease)     Cancer (HCC)     COPD (chronic obstructive pulmonary disease) (HCC)     De Quervain's tenosynovitis, right     Diabetes mellitus [facility]     Diabetic neuropathy ([facility]     DVT (deep venous thrombosis) ([facility] 10/2016    left leg     Hypertension     Lung cancer ([facility]     Metastatic lung cancer (metastasis from lung to other site) [facility])     bilat    Nuclear sclerosis of left eye     Osteoporosis     Peptic ulcer     Pseudophakia     Thyroid disease      Past Surgical History:   Procedure Laterality Date    BREAST SURGERY Left     biopsy    CORONARY ANGIOPLASTY WITH STENT PLACEMENT      EYE SURGERY      HYSTERECTOMY      LUNG REMOVAL, PARTIAL      left 2014     Social History     Social History    Marital status:      Spouse name: N/A    Number of children: N/A    Years of education: N/A     Social History Main Topics    Smoking status: Former Smoker    Smokeless tobacco: Never Used    Alcohol use No    Drug use: No    Sexual activity: Not Asked     Other Topics Concern    None     Social History Narrative    None       SCREENINGS      Heart Score for chest pain patients  History: Moderately Suspicious  ECG: Normal  Patient Age: > 65 years  Risk Factors: > 3 Risk factors or history of atherosclerotic disease*  Troponin: < 1X normal limit  Heart Score Total: 5      PHYSICAL EXAM    (up to 7 for level 4, 8 or more for level 5)     ED Triage Vitals [03/02/18 1159]   BP Temp Temp Source Pulse Resp SpO2 Height Weight   123/69 97.6 °F (36.4 °C) Oral 76 18 98 % 5' 8\" (1.727 m) 236 lb (107 kg)       Physical Exam   Constitutional: She is oriented to person, place, and time. She appears well-developed and well-nourished. She is active. No distress. HENT:   Head: Normocephalic and atraumatic. Mouth/Throat: Mucous membranes are normal.   Neck: Normal range of motion. Neck supple.    Cardiovascular: Normal rate, regular rhythm, normal heart sounds, intact distal

## 2018-03-02 NOTE — PROGRESS NOTES
[facility] Respiratory Therapy Evaluation   Current Order:  Albuterol inhaler 2 puffs   Q4 prn  Home Regimen: PRN     Ordering Physician: Carlos  Re-evaluation Date:  3/5     Diagnosis: Chest pain     Patient Status: Stable / Unstable + Physician notified    The following MDI Criteria must be met in order to convert aerosol to MDI with spacer. If unable to meet, MDI will be converted to aerosol:  []  Patient able to demonstrate the ability to use MDI effectively  []  Patient alert and cooperative  []  Patient able to take deep breath with 5-10 second hold  []  Medication(s) available in this delivery method   []  Peak flow greater than or equal to 200 ml/min            Current Order Substituted To  (same drug, same frequency)   Aerosol to MDI [] Albuterol Sulfate 0.083% unit dose by aerosol Albuterol Sulfate MDI 2 puffs by inhalation with spacer    [] Levalbuterol 1.25 mg unit dose by aerosol Levalbuterol MDI 2 puffs by inhalation with spacer    [] Levalbuterol 0.63 mg unit dose by aerosol Levalbuterol MDI 2 puffs by inhalation with spacer    [] Ipratropium Bromide 0.02% unit dose by aerosol Ipratropium Bromide MDI 2 puffs by inhalation with spacer    [] Duoneb (Ipratropium + Albuterol) unit dose by aerosol Ipratropium MDI + Albuterol MDI 2 puffs by inhalation w/spacer   MDI to Aerosol [] Albuterol Sulfate MDI Albuterol Sulfate 0.083% unit dose by aerosol    [] Levalbuterol MDI 2 puffs by inhalation Levalbuterol 1.25 mg unit dose by aerosol    [] Ipratropium Bromide MDI by inhalation Ipratropium Bromide 0.02% unit dose by aerosol    [] Combivent (Ipratropium + Albuterol) MDI by inhalation Duoneb (Ipratropium + Albuterol) unit dose by aerosol   Treatment Assessment [Frequency/Schedule]:  Change frequency to: No changes PRN __________________________________________________per Protocol, P&T, MEC      Points 0 1 2 3 4   Pulmonary Status  Non-Smoker  []   Smoking history   < 20 pack years  []   Smoking history  ?  21

## 2018-03-02 NOTE — ED NOTES
Pt states her left chest pain is coming back. Rates 4/10. NP [place] made aware.      Nell Thomas RN  03/02/18 5688

## 2018-03-03 LAB
GFR AFRICAN AMERICAN: >60
GFR NON-AFRICAN AMERICAN: 54
GLUCOSE BLD-MCNC: 200 MG/DL (ref 60–115)
GLUCOSE BLD-MCNC: 220 MG/DL (ref 60–115)
GLUCOSE BLD-MCNC: 238 MG/DL (ref 60–115)
GLUCOSE BLD-MCNC: 318 MG/DL (ref 60–115)
HBA1C MFR BLD: 12.3 % (ref 4.8–5.9)
HCT VFR BLD CALC: 36.3 % (ref 37–47)
HEMOGLOBIN: 12.1 G/DL (ref 12–16)
MCH RBC QN AUTO: 26.8 PG (ref 27–31.3)
MCHC RBC AUTO-ENTMCNC: 33.4 % (ref 33–37)
MCV RBC AUTO: 80.2 FL (ref 82–100)
PDW BLD-RTO: 17.2 % (ref 11.5–14.5)
PERFORMED ON: ABNORMAL
PLATELET # BLD: 267 K/UL (ref 130–400)
POC CREATININE: 1 MG/DL (ref 0.6–1.2)
POC SAMPLE TYPE: ABNORMAL
RBC # BLD: 4.52 M/UL (ref 4.2–5.4)
TROPONIN: <0.01 NG/ML (ref 0–0.01)
WBC # BLD: 10 K/UL (ref 4.8–10.8)

## 2018-03-03 PROCEDURE — 6370000000 HC RX 637 (ALT 250 FOR IP): Performed by: INTERNAL MEDICINE

## 2018-03-03 PROCEDURE — 2580000003 HC RX 258: Performed by: INTERNAL MEDICINE

## 2018-03-03 PROCEDURE — 2060000000 HC ICU INTERMEDIATE R&B

## 2018-03-03 PROCEDURE — 96372 THER/PROPH/DIAG INJ SC/IM: CPT

## 2018-03-03 PROCEDURE — 6360000002 HC RX W HCPCS: Performed by: INTERNAL MEDICINE

## 2018-03-03 PROCEDURE — 36415 COLL VENOUS BLD VENIPUNCTURE: CPT

## 2018-03-03 PROCEDURE — 85027 COMPLETE CBC AUTOMATED: CPT

## 2018-03-03 PROCEDURE — 99222 1ST HOSP IP/OBS MODERATE 55: CPT | Performed by: INTERNAL MEDICINE

## 2018-03-03 PROCEDURE — 83036 HEMOGLOBIN GLYCOSYLATED A1C: CPT

## 2018-03-03 PROCEDURE — 84484 ASSAY OF TROPONIN QUANT: CPT

## 2018-03-03 PROCEDURE — 0HDNXZZ EXTRACTION OF LEFT FOOT SKIN, EXTERNAL APPROACH: ICD-10-PCS | Performed by: PODIATRIST

## 2018-03-03 RX ORDER — NORTRIPTYLINE HYDROCHLORIDE 25 MG/1
75 CAPSULE ORAL NIGHTLY
Status: DISCONTINUED | OUTPATIENT
Start: 2018-03-03 | End: 2018-03-09 | Stop reason: HOSPADM

## 2018-03-03 RX ORDER — ASPIRIN 81 MG/1
81 TABLET ORAL DAILY
Status: DISCONTINUED | OUTPATIENT
Start: 2018-03-03 | End: 2018-03-09 | Stop reason: HOSPADM

## 2018-03-03 RX ORDER — ISOSORBIDE MONONITRATE 30 MG/1
30 TABLET, EXTENDED RELEASE ORAL DAILY
Status: DISCONTINUED | OUTPATIENT
Start: 2018-03-04 | End: 2018-03-09 | Stop reason: HOSPADM

## 2018-03-03 RX ORDER — OXYCODONE HYDROCHLORIDE AND ACETAMINOPHEN 5; 325 MG/1; MG/1
1 TABLET ORAL EVERY 4 HOURS PRN
Status: DISCONTINUED | OUTPATIENT
Start: 2018-03-03 | End: 2018-03-09 | Stop reason: HOSPADM

## 2018-03-03 RX ORDER — ASPIRIN 81 MG/1
81 TABLET ORAL DAILY
Status: DISCONTINUED | OUTPATIENT
Start: 2018-03-04 | End: 2018-03-03

## 2018-03-03 RX ORDER — NORTRIPTYLINE HYDROCHLORIDE 25 MG/1
75 CAPSULE ORAL NIGHTLY
Status: DISCONTINUED | OUTPATIENT
Start: 2018-03-03 | End: 2018-03-03 | Stop reason: CLARIF

## 2018-03-03 RX ADMIN — OXYCODONE HYDROCHLORIDE AND ACETAMINOPHEN 1 TABLET: 5; 325 TABLET ORAL at 18:10

## 2018-03-03 RX ADMIN — ENOXAPARIN SODIUM 120 MG: 120 INJECTION, SOLUTION INTRAVENOUS; SUBCUTANEOUS at 22:08

## 2018-03-03 RX ADMIN — DOCUSATE SODIUM 100 MG: 100 CAPSULE, LIQUID FILLED ORAL at 22:12

## 2018-03-03 RX ADMIN — PROPRANOLOL HYDROCHLORIDE 60 MG: 60 CAPSULE, EXTENDED RELEASE ORAL at 12:17

## 2018-03-03 RX ADMIN — NORTRIPTYLINE HYDROCHLORIDE 75 MG: 25 CAPSULE ORAL at 22:12

## 2018-03-03 RX ADMIN — TRAMADOL HYDROCHLORIDE 100 MG: 50 TABLET, FILM COATED ORAL at 09:48

## 2018-03-03 RX ADMIN — ENOXAPARIN SODIUM 105 MG: 120 INJECTION, SOLUTION INTRAVENOUS; SUBCUTANEOUS at 09:48

## 2018-03-03 RX ADMIN — HYDROCHLOROTHIAZIDE 25 MG: 25 TABLET ORAL at 09:48

## 2018-03-03 RX ADMIN — LEVOTHYROXINE SODIUM 150 MCG: 150 TABLET ORAL at 12:17

## 2018-03-03 RX ADMIN — INSULIN HUMAN 90 UNITS: 100 INJECTION, SUSPENSION SUBCUTANEOUS at 09:49

## 2018-03-03 RX ADMIN — PROPRANOLOL HYDROCHLORIDE 60 MG: 60 CAPSULE, EXTENDED RELEASE ORAL at 22:12

## 2018-03-03 RX ADMIN — PRIMIDONE 50 MG: 50 TABLET ORAL at 22:12

## 2018-03-03 RX ADMIN — WARFARIN SODIUM 5 MG: 2.5 TABLET ORAL at 18:54

## 2018-03-03 RX ADMIN — INSULIN LISPRO 2 UNITS: 100 INJECTION, SOLUTION INTRAVENOUS; SUBCUTANEOUS at 17:15

## 2018-03-03 RX ADMIN — Medication 10 ML: at 11:43

## 2018-03-03 RX ADMIN — INSULIN LISPRO 4 UNITS: 100 INJECTION, SOLUTION INTRAVENOUS; SUBCUTANEOUS at 12:18

## 2018-03-03 RX ADMIN — ASPIRIN 81 MG: 81 TABLET, COATED ORAL at 12:15

## 2018-03-03 RX ADMIN — INSULIN LISPRO 2 UNITS: 100 INJECTION, SOLUTION INTRAVENOUS; SUBCUTANEOUS at 09:49

## 2018-03-03 RX ADMIN — PANTOPRAZOLE SODIUM 40 MG: 40 TABLET, DELAYED RELEASE ORAL at 09:48

## 2018-03-03 RX ADMIN — Medication 10 ML: at 22:13

## 2018-03-03 RX ADMIN — PRAVASTATIN SODIUM 80 MG: 80 TABLET ORAL at 22:12

## 2018-03-03 RX ADMIN — OXYCODONE HYDROCHLORIDE AND ACETAMINOPHEN 1 TABLET: 5; 325 TABLET ORAL at 22:22

## 2018-03-03 ASSESSMENT — PAIN SCALES - GENERAL
PAINLEVEL_OUTOF10: 7
PAINLEVEL_OUTOF10: 8
PAINLEVEL_OUTOF10: 10
PAINLEVEL_OUTOF10: 0
PAINLEVEL_OUTOF10: 8

## 2018-03-03 NOTE — CONSULTS
stenosis and 50% to 60% ostial PDA stenosis. Her last  myocardial perfusion imaging study on 05/02/2016 was consistent with no  evidence of ischemia or myocardial infarction with left ventricular  ejection fraction of 68%. Her last echocardiogram on 12/19/2016 showed  left ventricular ejection fraction of 55% with mild concentric left  ventricular hypertrophy and small circumferential pericardial effusion. PHYSICAL EXAMINATION:  VITAL SIGNS:  Blood pressure of 119/53 mmHg, heart rate of 72 beats per  minute, respiratory rate was 16, and temperature 36.4. GENERAL:  The patient was alert and oriented x3. HEENT:  Head normocephalic. HEENT normal.  NECK:  Supple. LUNGS:  Decreased breath sounds bilaterally, more on the left than the  right. CARDIOVASCULAR:  Regular rate and rhythm. No murmurs or gallops. ABDOMEN:  Soft. Bowel sounds present. No tenderness or organomegaly. EXTREMITIES:  With no edema in the lower extremities. CLINICAL IMPRESSION:  1. Chest pain in a patient with coronary artery disease with percutaneous  coronary interventions in the past, ruled out acute coronary syndrome by  EKG and also cardiac enzymes. 2.  History of coronary artery disease with percutaneous coronary  interventions in the past.  3.  History of normal left ventricular function per echocardiogram in 2016. 4.  Hypertension. 5.  Hyperlipidemia. 6.  Lung cancer with metastasis, off chemotherapy since 12/2017.  7.  History of DVT. PLAN AND RECOMMENDATIONS:  1. Her chest pain is most likely related with her lung cancer. She was  ruled out for acute coronary syndrome based on EKG and cardiac enzymes. Even then, we will recommend an echocardiogram for evaluation of left  ventricular ejection fraction as well as possibility of effusion.   2.  Continue on telemetry while she is in the hospital.  3.  Rest of the plan per primary team and oncology team.      Sorin Fisher MD    D: 03/03/2018 12:15:10       T:

## 2018-03-03 NOTE — CONSULTS
times daily (before meals) ) 6 vial 3    aspirin EC 81 MG EC tablet Take 1 tablet by mouth daily 30 tablet 3    warfarin (COUMADIN) 2.5 MG tablet Take 2.5 mg (one tab) today and 5mg (2 tabs on Thursday) and then pt is to f/u with Dr. Mary Ann Peres for further orders (Patient taking differently: Take 2.5 mg by mouth daily Indications: 5mg on wed and sat, 2.5mg on all other days Take 2.5 mg (one tab) today and 5mg (2 tabs on Thursday) and then pt is to f/u with Dr. Mary Ann Peres for further orders) 60 tablet 5    folic acid (FOLVITE) 1 MG tablet Take 1 mg by mouth daily      isosorbide mononitrate (IMDUR) 30 MG extended release tablet Take 30 mg by mouth daily      levothyroxine (SYNTHROID) 150 MCG tablet Take 150 mcg by mouth Daily      nitroGLYCERIN (NITROSTAT) 0.4 MG SL tablet Place 0.4 mg under the tongue every 5 minutes as needed for Chest pain      pravastatin (PRAVACHOL) 80 MG tablet Take 80 mg by mouth daily      primidone (MYSOLINE) 50 MG tablet Take 50 mg by mouth nightly      albuterol sulfate  (90 BASE) MCG/ACT inhaler Inhale 2 puffs into the lungs every 4 hours as needed for Wheezing       propranolol (INDERAL LA) 60 MG extended release capsule Take 60 mg by mouth 2 times daily        Allergies   Allergen Reactions    Penicillins          ROS:  Unremarkable except for symptoms mentioned in HPI. PHYSICAL EXAMINATION:   VITAL SIGNS: BP (!) 119/53   Pulse 73   Temp 97.6 °F (36.4 °C) (Oral)   Resp 16   Ht 5' 8\" (1.727 m)   Wt 234 lb 12.6 oz (106.5 kg)   SpO2 94%   BMI 35.70 kg/m²     GENERAL: In no acute distress, well- nourished, well- developed, alert and oriented to person place and time. SKIN: Warm and dry, without jaundice, ecchymoses, or petechiae.   HEENT: Normocephalic,pink conjunctivae; sclera anicteric, oral mucosa moist without lesion or exudate in the visible oral cavity or oropharynx, ; no epistaxis  NECK: supple; no JVD; no thyromegaly  LYMPH NODES: No palpable adenopathy in

## 2018-03-03 NOTE — PROGRESS NOTES
[facility]    Respiratory Care Assessment Sheet for Pulmonary Rehab Patients    DATE: Please enter order for Pulmonary Rehab Consult if patient meets criteria for one of the following:      TIME:         Pulmonary Rehab Assessment  Check the box if the patient is limited by any of the following:      [x]  COPD       [] Lung Transplant  [] Pulmonary Hypertension  []  Pulmonary Fibrosis               [] Lung Resection  [] Current Smoker   []  Chronic Asthma         [] Long Term Oxygen Use  For COPD diagnosis GOLD stage 2,3 or 4 only. (Determined by complete PFT)            Per Respiratory Protocol   Doctor per the above assessment(s), your patient will have a. .       Pulmonary Rehab Consult []       Please consider them for a referral/ consult before discharge

## 2018-03-03 NOTE — H&P
[facility]    HISTORY AND PHYSICAL EXAM    PATIENT NAME:  Maria M Turcios    MRN:  [id number]  SERVICE DATE:  3/3/2018   SERVICE TIME:  8:50 AM    Primary Care Physician: Denys An MD       ASSESSMENT AND PLAN  Patient Active Hospital Problem List:   CAD in native artery (3/3/2016)    Assessment: Patient with prior history of coronary artery disease currently presenting with what appears to be unstable angina for about 5 days, relieved with nitroglycerin     Plan: Cardiology on consult. Type 2 diabetes mellitus with hyperglycemia, without long-term current use of insulin (HCC) (12/17/2016)    Assessment: Continue insulin coverage will have endocrinology follow the patient     Plan: As above    Essential hypertension (12/17/2016)    Assessment: We'll continue to monitor and adjust medications as needed     Plan: As above    [facility] (12/19/2016)    Assessment: Appears to have some new lesions perhaps in the lungs noted on a CAT scan will have to see if these are truly new     Plan: Consult oncology    Acquired hypothyroidism (12/19/2016)    Assessment: Stable     Plan: Continue current regimen           SUBJECTIVE  CHIEF COMPLAINT:  71-year-old female with a history of breast cancer, long-standing poorly controlled diabetes mellitus, chronic renal insufficiency, presenting with left-sided sharp chest pain that has been intermittently present for about 5-7 days, relieved with nitroglycerin. She was in [place] and therefore did not choose to seek help there, as the pain continued to present itself and perhaps get a little bit worse she came to the emergency room.     HPI:  This is a 68 y.o. female who presents with complaints as above    PAST MEDICAL HISTORY:    Past Medical History:   Diagnosis Date    Atherosclerosis of native coronary artery     Benign familial tremor     CAD (coronary artery disease)     [facility]     [facility]) [facility]     De Quervain's tenosynovitis, right     Diabetes mellitus ([facility]     Diabetic neuropathy ([facility]     DVT (deep venous thrombosis) ([facility] 10/2016    left leg     Hypertension     Lung cancer ([facility]     Metastatic lung cancer (metastasis from lung to other site) [facility])     bilat    Nuclear sclerosis of left eye     Osteoporosis     Peptic ulcer     Pseudophakia     Thyroid disease      PAST SURGICAL HISTORY:    Past Surgical History:   Procedure Laterality Date    BREAST SURGERY Left     biopsy    CORONARY ANGIOPLASTY WITH STENT PLACEMENT      EYE SURGERY      HYSTERECTOMY      LUNG REMOVAL, PARTIAL      left 2014     FAMILY HISTORY:  History reviewed. No pertinent family history. SOCIAL HISTORY:    Social History     Social History    Marital status:      Spouse name: N/A    Number of children: N/A    Years of education: N/A     Occupational History    Not on file. Social History Main Topics    Smoking status: Former Smoker    Smokeless tobacco: Never Used    Alcohol use No    Drug use: No    Sexual activity: Not on file     Other Topics Concern    Not on file     Social History Narrative    No narrative on file     MEDICATIONS: reviewed    ALLERGIES: Penicillins    REVIEW OF SYSTEM:   ROS as noted in HPI, 12 point ROS reviewed and otherwise negative.     OBJECTIVE  PHYSICAL EXAM: BP (!) 119/53   Pulse 73   Temp 97.6 °F (36.4 °C)   Resp 19   Ht 5' 8\" (1.727 m)   Wt 234 lb 12.6 oz (106.5 kg)   SpO2 94%   BMI 35.70 kg/m²   CONSTITUTIONAL:  fatigued  EYES:  Lids and lashes normal, pupils equal, round and reactive to light, extra ocular muscles intact, sclera clear, conjunctiva normal  NECK:  Supple, symmetrical, trachea midline, no adenopathy, thyroid symmetric, not enlarged and no tenderness, skin normal  BACK:  Symmetric, no curvature, spinous processes are non-tender on palpation, paraspinous muscles are non-tender on palpation, no costal vertebral tenderness  LUNGS:

## 2018-03-04 ENCOUNTER — APPOINTMENT (OUTPATIENT)
Dept: MRI IMAGING | Age: 73
DRG: 181 | End: 2018-03-04
Payer: MEDICARE

## 2018-03-04 LAB
ANION GAP SERPL CALCULATED.3IONS-SCNC: 12 MEQ/L (ref 7–13)
BUN BLDV-MCNC: 24 MG/DL (ref 8–23)
CALCIUM SERPL-MCNC: 9.3 MG/DL (ref 8.6–10.2)
CHLORIDE BLD-SCNC: 96 MEQ/L (ref 98–107)
CO2: 26 MEQ/L (ref 22–29)
CREAT SERPL-MCNC: 1.01 MG/DL (ref 0.5–0.9)
GFR AFRICAN AMERICAN: >60
GFR NON-AFRICAN AMERICAN: 53.9
GLUCOSE BLD-MCNC: 237 MG/DL (ref 60–115)
GLUCOSE BLD-MCNC: 265 MG/DL (ref 60–115)
GLUCOSE BLD-MCNC: 272 MG/DL (ref 74–109)
GLUCOSE BLD-MCNC: 297 MG/DL (ref 60–115)
GLUCOSE BLD-MCNC: 416 MG/DL (ref 60–115)
INR BLD: 1.8
PERFORMED ON: ABNORMAL
POTASSIUM SERPL-SCNC: 4.1 MEQ/L (ref 3.5–5.1)
PROTHROMBIN TIME: 19.3 SEC (ref 8.1–13.7)
SODIUM BLD-SCNC: 134 MEQ/L (ref 132–144)

## 2018-03-04 PROCEDURE — 6370000000 HC RX 637 (ALT 250 FOR IP): Performed by: INTERNAL MEDICINE

## 2018-03-04 PROCEDURE — 85610 PROTHROMBIN TIME: CPT

## 2018-03-04 PROCEDURE — 2060000000 HC ICU INTERMEDIATE R&B

## 2018-03-04 PROCEDURE — A9579 GAD-BASE MR CONTRAST NOS,1ML: HCPCS | Performed by: INTERNAL MEDICINE

## 2018-03-04 PROCEDURE — 80048 BASIC METABOLIC PNL TOTAL CA: CPT

## 2018-03-04 PROCEDURE — 2580000003 HC RX 258: Performed by: INTERNAL MEDICINE

## 2018-03-04 PROCEDURE — 72157 MRI CHEST SPINE W/O & W/DYE: CPT

## 2018-03-04 PROCEDURE — 6360000004 HC RX CONTRAST MEDICATION: Performed by: INTERNAL MEDICINE

## 2018-03-04 PROCEDURE — 36415 COLL VENOUS BLD VENIPUNCTURE: CPT

## 2018-03-04 PROCEDURE — 72158 MRI LUMBAR SPINE W/O & W/DYE: CPT

## 2018-03-04 PROCEDURE — 6360000002 HC RX W HCPCS: Performed by: INTERNAL MEDICINE

## 2018-03-04 RX ORDER — DEXAMETHASONE SODIUM PHOSPHATE 10 MG/ML
4 INJECTION INTRAMUSCULAR; INTRAVENOUS EVERY 6 HOURS
Status: DISCONTINUED | OUTPATIENT
Start: 2018-03-04 | End: 2018-03-04 | Stop reason: RX

## 2018-03-04 RX ORDER — DEXAMETHASONE SODIUM PHOSPHATE 10 MG/ML
4 INJECTION INTRAMUSCULAR; INTRAVENOUS EVERY 6 HOURS
Status: DISCONTINUED | OUTPATIENT
Start: 2018-03-04 | End: 2018-03-05

## 2018-03-04 RX ORDER — DEXAMETHASONE SODIUM PHOSPHATE 10 MG/ML
10 INJECTION INTRAMUSCULAR; INTRAVENOUS ONCE
Status: COMPLETED | OUTPATIENT
Start: 2018-03-04 | End: 2018-03-04

## 2018-03-04 RX ORDER — LORAZEPAM 0.5 MG/1
0.5 TABLET ORAL EVERY 30 MIN PRN
Status: COMPLETED | OUTPATIENT
Start: 2018-03-04 | End: 2018-03-04

## 2018-03-04 RX ORDER — DEXAMETHASONE SODIUM PHOSPHATE 10 MG/ML
10 INJECTION INTRAMUSCULAR; INTRAVENOUS ONCE
Status: DISCONTINUED | OUTPATIENT
Start: 2018-03-04 | End: 2018-03-04 | Stop reason: CLARIF

## 2018-03-04 RX ORDER — LORAZEPAM 0.5 MG/1
0.5 TABLET ORAL ONCE
Status: DISCONTINUED | OUTPATIENT
Start: 2018-03-04 | End: 2018-03-04 | Stop reason: CLARIF

## 2018-03-04 RX ADMIN — DEXAMETHASONE SODIUM PHOSPHATE 10 MG: 10 INJECTION INTRAMUSCULAR; INTRAVENOUS at 16:23

## 2018-03-04 RX ADMIN — LORAZEPAM 0.5 MG: 0.5 TABLET ORAL at 18:04

## 2018-03-04 RX ADMIN — NORTRIPTYLINE HYDROCHLORIDE 75 MG: 25 CAPSULE ORAL at 21:53

## 2018-03-04 RX ADMIN — LORAZEPAM 0.5 MG: 0.5 TABLET ORAL at 16:22

## 2018-03-04 RX ADMIN — HYDROXYZINE HYDROCHLORIDE 25 MG: 25 TABLET ORAL at 18:04

## 2018-03-04 RX ADMIN — ASPIRIN 81 MG: 81 TABLET, COATED ORAL at 08:15

## 2018-03-04 RX ADMIN — ISOSORBIDE MONONITRATE 30 MG: 30 TABLET, EXTENDED RELEASE ORAL at 08:15

## 2018-03-04 RX ADMIN — OXYCODONE HYDROCHLORIDE AND ACETAMINOPHEN 1 TABLET: 5; 325 TABLET ORAL at 05:50

## 2018-03-04 RX ADMIN — GADOTERIDOL 20 ML: 279.3 INJECTION, SOLUTION INTRAVENOUS at 18:15

## 2018-03-04 RX ADMIN — FOLIC ACID 1 MG: 1 TABLET ORAL at 08:15

## 2018-03-04 RX ADMIN — HYDROCHLOROTHIAZIDE 25 MG: 25 TABLET ORAL at 08:15

## 2018-03-04 RX ADMIN — PROPRANOLOL HYDROCHLORIDE 60 MG: 60 CAPSULE, EXTENDED RELEASE ORAL at 08:15

## 2018-03-04 RX ADMIN — INSULIN HUMAN 80 UNITS: 100 INJECTION, SUSPENSION SUBCUTANEOUS at 08:19

## 2018-03-04 RX ADMIN — WARFARIN SODIUM 5 MG: 2.5 TABLET ORAL at 21:53

## 2018-03-04 RX ADMIN — DEXAMETHASONE SODIUM PHOSPHATE 4 MG: 10 INJECTION INTRAMUSCULAR; INTRAVENOUS at 21:47

## 2018-03-04 RX ADMIN — PROPRANOLOL HYDROCHLORIDE 60 MG: 60 CAPSULE, EXTENDED RELEASE ORAL at 21:00

## 2018-03-04 RX ADMIN — ENOXAPARIN SODIUM 105 MG: 120 INJECTION, SOLUTION INTRAVENOUS; SUBCUTANEOUS at 11:06

## 2018-03-04 RX ADMIN — Medication 10 ML: at 21:54

## 2018-03-04 RX ADMIN — Medication 10 ML: at 08:17

## 2018-03-04 RX ADMIN — PANTOPRAZOLE SODIUM 40 MG: 40 TABLET, DELAYED RELEASE ORAL at 05:50

## 2018-03-04 RX ADMIN — DOCUSATE SODIUM 100 MG: 100 CAPSULE, LIQUID FILLED ORAL at 08:15

## 2018-03-04 RX ADMIN — PRAVASTATIN SODIUM 80 MG: 80 TABLET ORAL at 21:00

## 2018-03-04 RX ADMIN — LEVOTHYROXINE SODIUM 150 MCG: 150 TABLET ORAL at 05:51

## 2018-03-04 RX ADMIN — PRIMIDONE 50 MG: 50 TABLET ORAL at 21:52

## 2018-03-04 ASSESSMENT — PAIN SCALES - GENERAL
PAINLEVEL_OUTOF10: 8
PAINLEVEL_OUTOF10: 0
PAINLEVEL_OUTOF10: 0

## 2018-03-04 NOTE — FLOWSHEET NOTE
Am assessment completed. Awake and resting quietly. Denies any pain at this time. resp even and nonlabored. Waiting for INR for lovenox administration. No complaints at this time.  Electronically signed by Alex Cline RN on 3/4/2018 at 10:19 AM

## 2018-03-04 NOTE — PROGRESS NOTES
Lab Results   Component Value Date    APTT 61.8 05/20/2017     CMP:  Lab Results   Component Value Date     03/04/2018    K 4.1 03/04/2018    CL 96 03/04/2018    CO2 26 03/04/2018    BUN 24 03/04/2018    PROT 6.9 03/02/2018     Magnesium:    Lab Results   Component Value Date    MG 2.1 08/31/2017     Phosphorus:  No components found for: PO4  Calcium:  No components found for: CA  CBC:  Lab Results   Component Value Date    WBC 10.0 03/03/2018    RBC 4.52 03/03/2018    HGB 12.1 03/03/2018    HCT 36.3 03/03/2018    MCV 80.2 03/03/2018    RDW 17.2 03/03/2018     03/03/2018     DIFF:  Lab Results   Component Value Date    MCV 80.2 03/03/2018    RDW 17.2 03/03/2018      LDH:  No results found for: LDH  Uric Acid:  No components found for: URIC    CBC with Differential:  Lab Results   Component Value Date    WBC 10.0 03/03/2018    RBC 4.52 03/03/2018    HGB 12.1 03/03/2018    HCT 36.3 03/03/2018     03/03/2018    MCV 80.2 03/03/2018    MCH 26.8 03/03/2018    MCHC 33.4 03/03/2018    RDW 17.2 03/03/2018    NRBC 1 05/21/2017    LYMPHOPCT 12.2 03/02/2018    MONOPCT 7.1 03/02/2018    MYELOPCT 1 05/21/2017    BASOPCT 0.8 03/02/2018    MONOSABS 0.8 03/02/2018    LYMPHSABS 1.4 03/02/2018    EOSABS 0.1 03/02/2018    BASOSABS 0.1 03/02/2018     CMP:  Lab Results   Component Value Date     03/04/2018    K 4.1 03/04/2018    CL 96 03/04/2018    CO2 26 03/04/2018    BUN 24 03/04/2018    CREATININE 1.01 03/04/2018    GFRAA >60.0 03/04/2018    LABGLOM 53.9 03/04/2018    GLUCOSE 272 03/04/2018    PROT 6.9 03/02/2018    LABALBU 3.8 03/02/2018    CALCIUM 9.3 03/04/2018    BILITOT 0.3 03/02/2018    ALKPHOS 164 03/02/2018    AST 17 03/02/2018    ALT 14 03/02/2018     LDH:  No results found for: LDH  Warfarin PT/INR:  No components found for: PTPATWAR, PTINRWAR  PTT:    Lab Results   Component Value Date    APTT 61.8 05/20/2017   [APTT}  VITAMIN B12: No components found for: B12  FOLATE:  No results found for:

## 2018-03-04 NOTE — PLAN OF CARE
Problem: Falls - Risk of  Goal: Absence of falls  Outcome: Ongoing      Problem: Pain:  Goal: Pain level will decrease  Pain level will decrease   Outcome: Ongoing    Goal: Control of acute pain  Control of acute pain   Outcome: Ongoing    Goal: Control of chronic pain  Control of chronic pain   Outcome: Ongoing      Problem: Infection:  Goal: Will remain free from infection  Will remain free from infection   Outcome: Ongoing      Problem: Safety:  Goal: Free from accidental physical injury  Free from accidental physical injury   Outcome: Ongoing      Problem: Daily Care:  Goal: Daily care needs are met  Daily care needs are met   Outcome: Ongoing      Problem: Skin Integrity:  Goal: Skin integrity will stabilize  Skin integrity will stabilize   Outcome: Ongoing      Problem: Discharge Planning:  Goal: Patients continuum of care needs are met  Patients continuum of care needs are met   Outcome: Ongoing

## 2018-03-04 NOTE — CONSULTS
PODIATRIC MEDICINE AND SURGERY  CONSULT HISTORY AND PHYSICAL      Consulting Service:  Rehab  Requesting Provider: Dr. Trini Sanders MD  Opinion/advice regarding: Skin lesion left foot  Staff Doctor:  Dr. Israel Vazquez:  This 68 y.o. female with PMH of CAD,Metastatic lung CA,COPD, DM with neuropathy, thyroid disease presents with painful callus on the left foot in setting of diabetes mellitus     Plan:  Exam and evaluation  Callus debrided without incident with #15 blade. Educated patient on diabetic foot care and importance of checking feet daily. Discussed with patient can follow up with Dr. Janae Healy for diabetic nail care as outpatient if desired  Podiatry will sign off. HPI: This very pleasant 68y.o. year old female with PMH of CAD,Metastatic lung CA,COPD, DM with neuropathy, thyroid disease seen today for painful callus. Patient states that the callus is painful, especially when wearing shoes. Patient states that their pain is 1/10 on the pain scale and is aching in nature. Patient denies nausea, vomiting, diarrhea, fevers, chills, or calf pain. No other pedal complaints.        Past Medical History:   Diagnosis Date    Atherosclerosis of native coronary artery     Benign familial tremor     CAD (coronary artery disease)     Cancer (HCC)     COPD (chronic obstructive pulmonary disease) (HCC)     De Quervain's tenosynovitis, right     Diabetes mellitus ([address]     Diabetic neuropathy ([address]     DVT (deep venous thrombosis) ([address] 10/2016    left leg     Hypertension     Lung cancer ([address]     Metastatic lung cancer (metastasis from lung to other site) [facility])     bilat    Nuclear sclerosis of left eye     Osteoporosis     Peptic ulcer     Pseudophakia     Thyroid disease        Past Surgical History:   Procedure Laterality Date    BREAST SURGERY Left     biopsy    CORONARY ANGIOPLASTY WITH STENT PLACEMENT      EYE SURGERY      HYSTERECTOMY      LUNG REMOVAL, morning (before breakfast) 30 capsule 0    famotidine (PEPCID) 20 MG tablet Take 1 tablet by mouth 2 times daily 30 tablet 0    Insulin Syringe-Needle U-100 ([employer] INSULIN SYR 1CC/30G) 30G X 5/16\" 1 ML MISC 1 each by Does not apply route daily 100 each 3    doxepin (SINEQUAN) 100 MG capsule Take 200 mg by mouth nightly         Allergies   Allergen Reactions    Penicillins        History reviewed. No pertinent family history. Social History     Social History    Marital status:      Spouse name: N/A    Number of children: N/A    Years of education: N/A     Occupational History    Not on file. Social History Main Topics    Smoking status: Former Smoker    Smokeless tobacco: Never Used    Alcohol use No    Drug use: No    Sexual activity: Not on file     Other Topics Concern    Not on file     Social History Narrative    No narrative on file         REVIEW OF SYSTEMS:  CONSTITUTIONAL:  No fevers, chills, nightsweats, unintended weight loss  HEENT:  Denies frequent or severe headaches, nasal congestion/sinus symptoms, problematic allergy problems. EYES:  No diplopia or blurry vision. CARDIOVASCULAR:  No chest pain, dyspnea, palpitations, orthopnea  PULM:  No dyspnea, unexplained cough. GI:  No dysphagia/odynophagia, problematic reflux, constipation, diarrhea, changes in stool habits, hematochezia, melena. :  No new urinary complaints, including dysuria, gross hematuria or pyuria. NEURO:  No new balance problems, peripheral weakness/paresthesias or numbness of concern. MUSC-SKEL:  No new joint pain, swelling, or erythema. PSY:  No concerns regarding depression, anxiety or panic. INTEGUMENTARY:  No new skin changes (rash, new or changing mole, new growth)      OBJECTIVE:  BP (!) 124/42   Pulse 81   Temp 98.2 °F (36.8 °C)   Resp 18   Ht 5' 8\" (1.727 m)   Wt 234 lb 12.6 oz (106.5 kg)   SpO2 93%   BMI 35.70 kg/m²   Patient is alert and oriented x 3 in NAD.

## 2018-03-04 NOTE — FLOWSHEET NOTE
ORDERS FOR MRI added. Pt to be premedicated with ativan.  Electronically signed by Bee Charles RN on 3/4/2018 at 4:19 PM

## 2018-03-04 NOTE — CONSULTS
[address]                       [address], [address] [facility]                                   CONSULTATION    PATIENT NAME: Renzo Gaming                   :        1945  MED REC NO:   [id number]                            ROOM:       S067  ACCOUNT NO:   [de-identified]                           ADMIT DATE: 2018  PROVIDER:     Pravin Tillman MD    CONSULT DATE:  2018    REASON FOR CONSULTATION:  Management of uncontrolled diabetes. CHIEF COMPLAINT AND HISTORY OF PRESENT ILLNESS:  The patient is a  81-year-old female with known history of breast cancer and poorly  controlled diabetes, presenting with left-sided sharp chest pain that has  been getting worse over the last week or so, relieved with nitroglycerin. The patient has been seen by our office for diabetes followup. Her A1cs  have been mostly 12+. The patient has been taking Humulin N 80 units twice  a day and Humalog 20 to 30 with each meal.  Blood sugars have been high  here between 200 to 400 range. Most current A1c was at 12.3. Prior A1cs  have been mostly between 11.4 to 12. Does have a history of hypothyroidism  for which she is on Synthroid 150 mcg daily. TSH was at 1.10. Last TSH  was done in 2017. The patient has been seen by oncologist for history  of lung cancer over the left lower lobe as well as cardiology consult has  been done for chest pain. According to them, it is more than likely  related to her lung cancer. Labs were reviewed from yesterday. Sodium was  132, potassium 4.4, chloride was 93, CO2 was 25, BUN 17, and creatinine  0.96. WBC count was 11.6. PAST MEDICAL HISTORY:  Significant for type 2 diabetes, hypothyroidism,  lung cancer, history of DVT, diabetic neuropathy, COPD, cancer, coronary  artery disease, and tremors. PAST SURGICAL HISTORY:  Left lower lobe removal, hysterectomy, eye surgery,  angioplasty, and breast surgery.     FAMILY HISTORY:

## 2018-03-04 NOTE — FLOWSHEET NOTE
Assisted with bedpan. Per dtr. the patient knees weak and pt having occ muscle twitching.  Electronically signed by Mikaela Hoover RN on 3/4/2018 at 2:53 PM

## 2018-03-04 NOTE — PROGRESS NOTES
Oral BID Rita Ambrocio MD   60 mg at 03/04/18 6820    warfarin (COUMADIN) tablet 2.5 mg  2.5 mg Oral Once per day on Sun Mon Tue Thu Fri Wai Hernández MD   2.5 mg at 03/02/18 3086    warfarin (COUMADIN) tablet 5 mg  5 mg Oral Once per day on Wed Sat Wai Hernández MD   5 mg at 03/03/18 1854    glucose (GLUTOSE) 40 % oral gel 15 g  15 g Oral PRN Rita Ambrocio MD        dextrose 50 % solution 12.5 g  12.5 g Intravenous PRN Rita Ambrocio MD        glucagon (rDNA) injection 1 mg  1 mg Intramuscular PRN Rita Ambrocio MD        dextrose 5 % solution  100 mL/hr Intravenous PRN Rita Ambrocio MD        sodium chloride flush 0.9 % injection 10 mL  10 mL Intravenous 2 times per day Rita Ambrocio MD   10 mL at 03/04/18 0817    sodium chloride flush 0.9 % injection 10 mL  10 mL Intravenous PRN Rita Ambrocio MD        acetaminophen (TYLENOL) tablet 650 mg  650 mg Oral Q4H PRN Rita Ambrocio MD        traMADol Sallyann Maya) tablet 100 mg  100 mg Oral Q6H PRN Rita Ambrocio MD   100 mg at 03/03/18 0948    docusate sodium (COLACE) capsule 100 mg  100 mg Oral BID Rita Ambrocio MD   100 mg at 03/04/18 0815    ondansetron (ZOFRAN) injection 4 mg  4 mg Intravenous Q6H PRN Rita Ambrocio MD        enoxaparin (LOVENOX) injection 105 mg  1 mg/kg Subcutaneous BID Riat Ambrocio MD   120 mg at 03/03/18 2208    morphine injection 2 mg  2 mg Intravenous Q2H PRN Rita Ambrocio MD           OBJECTIVE  PHYSICAL EXAM:   BP (!) 124/42   Pulse 81   Temp 98.2 °F (36.8 °C)   Resp 18   Ht 5' 8\" (1.727 m)   Wt 234 lb 12.6 oz (106.5 kg)   SpO2 93%   BMI 35.70 kg/m²   Body mass index is 35.7 kg/m².   CONSTITUTIONAL:  awake and alert  NECK:  supple, symmetrical, trachea midline  LUNGS:  No increased work of breathing, good air exchange, clear to auscultation bilaterally, no crackles or wheezing  CARDIOVASCULAR:  Normal apical impulse, regular rate and rhythm, normal S1 and S2, no S3 or S4, and no

## 2018-03-05 ENCOUNTER — APPOINTMENT (OUTPATIENT)
Dept: NUCLEAR MEDICINE | Age: 73
DRG: 181 | End: 2018-03-05
Payer: MEDICARE

## 2018-03-05 ENCOUNTER — APPOINTMENT (OUTPATIENT)
Dept: CT IMAGING | Age: 73
DRG: 181 | End: 2018-03-05
Payer: MEDICARE

## 2018-03-05 LAB
GLUCOSE BLD-MCNC: 474 MG/DL (ref 60–115)
GLUCOSE BLD-MCNC: 495 MG/DL (ref 60–115)
GLUCOSE BLD-MCNC: 506 MG/DL (ref 60–115)
GLUCOSE BLD-MCNC: 554 MG/DL (ref 60–115)
INR BLD: 2.5
PERFORMED ON: ABNORMAL
PROTHROMBIN TIME: 26.8 SEC (ref 8.1–13.7)

## 2018-03-05 PROCEDURE — 6370000000 HC RX 637 (ALT 250 FOR IP): Performed by: INTERNAL MEDICINE

## 2018-03-05 PROCEDURE — 2580000003 HC RX 258: Performed by: INTERNAL MEDICINE

## 2018-03-05 PROCEDURE — 78306 BONE IMAGING WHOLE BODY: CPT

## 2018-03-05 PROCEDURE — 36415 COLL VENOUS BLD VENIPUNCTURE: CPT

## 2018-03-05 PROCEDURE — 6360000004 HC RX CONTRAST MEDICATION: Performed by: INTERNAL MEDICINE

## 2018-03-05 PROCEDURE — 3430000000 HC RX DIAGNOSTIC RADIOPHARMACEUTICAL: Performed by: INTERNAL MEDICINE

## 2018-03-05 PROCEDURE — 85610 PROTHROMBIN TIME: CPT

## 2018-03-05 PROCEDURE — 2500000003 HC RX 250 WO HCPCS

## 2018-03-05 PROCEDURE — 6360000002 HC RX W HCPCS: Performed by: INTERNAL MEDICINE

## 2018-03-05 PROCEDURE — 70470 CT HEAD/BRAIN W/O & W/DYE: CPT

## 2018-03-05 PROCEDURE — A9503 TC99M MEDRONATE: HCPCS | Performed by: INTERNAL MEDICINE

## 2018-03-05 PROCEDURE — 74177 CT ABD & PELVIS W/CONTRAST: CPT

## 2018-03-05 PROCEDURE — 2060000000 HC ICU INTERMEDIATE R&B

## 2018-03-05 RX ORDER — TC 99M MEDRONATE 20 MG/10ML
25 INJECTION, POWDER, LYOPHILIZED, FOR SOLUTION INTRAVENOUS
Status: COMPLETED | OUTPATIENT
Start: 2018-03-05 | End: 2018-03-05

## 2018-03-05 RX ORDER — SODIUM CHLORIDE 0.9 % (FLUSH) 0.9 %
10 SYRINGE (ML) INJECTION PRN
Status: DISCONTINUED | OUTPATIENT
Start: 2018-03-05 | End: 2018-03-09 | Stop reason: HOSPADM

## 2018-03-05 RX ADMIN — Medication 20 ML: at 08:05

## 2018-03-05 RX ADMIN — PANTOPRAZOLE SODIUM 40 MG: 40 TABLET, DELAYED RELEASE ORAL at 06:24

## 2018-03-05 RX ADMIN — ISOSORBIDE MONONITRATE 30 MG: 30 TABLET, EXTENDED RELEASE ORAL at 11:03

## 2018-03-05 RX ADMIN — POTASSIUM CHLORIDE 10 MEQ: 1.5 POWDER, FOR SOLUTION ORAL at 11:03

## 2018-03-05 RX ADMIN — DEXAMETHASONE SODIUM PHOSPHATE 4 MG: 10 INJECTION INTRAMUSCULAR; INTRAVENOUS at 04:05

## 2018-03-05 RX ADMIN — INSULIN HUMAN 80 UNITS: 100 INJECTION, SUSPENSION SUBCUTANEOUS at 17:18

## 2018-03-05 RX ADMIN — ASPIRIN 81 MG: 81 TABLET, COATED ORAL at 10:59

## 2018-03-05 RX ADMIN — INSULIN HUMAN 80 UNITS: 100 INJECTION, SUSPENSION SUBCUTANEOUS at 11:41

## 2018-03-05 RX ADMIN — FUROSEMIDE 20 MG: 20 TABLET ORAL at 11:01

## 2018-03-05 RX ADMIN — BARIUM SULFATE: 21 SUSPENSION ORAL at 06:25

## 2018-03-05 RX ADMIN — Medication 10 ML: at 11:06

## 2018-03-05 RX ADMIN — IOPAMIDOL 100 ML: 755 INJECTION, SOLUTION INTRAVENOUS at 08:14

## 2018-03-05 RX ADMIN — DOCUSATE SODIUM 100 MG: 100 CAPSULE, LIQUID FILLED ORAL at 11:01

## 2018-03-05 RX ADMIN — DOCUSATE SODIUM 100 MG: 100 CAPSULE, LIQUID FILLED ORAL at 21:31

## 2018-03-05 RX ADMIN — OXYCODONE HYDROCHLORIDE AND ACETAMINOPHEN 1 TABLET: 5; 325 TABLET ORAL at 21:31

## 2018-03-05 RX ADMIN — PROPRANOLOL HYDROCHLORIDE 60 MG: 60 CAPSULE, EXTENDED RELEASE ORAL at 11:01

## 2018-03-05 RX ADMIN — LEVOTHYROXINE SODIUM 150 MCG: 150 TABLET ORAL at 11:00

## 2018-03-05 RX ADMIN — FOLIC ACID 1 MG: 1 TABLET ORAL at 11:01

## 2018-03-05 RX ADMIN — PROPRANOLOL HYDROCHLORIDE 60 MG: 60 CAPSULE, EXTENDED RELEASE ORAL at 21:31

## 2018-03-05 RX ADMIN — Medication 29.4 MILLICURIE: at 07:55

## 2018-03-05 RX ADMIN — ENOXAPARIN SODIUM 105 MG: 120 INJECTION, SOLUTION INTRAVENOUS; SUBCUTANEOUS at 09:00

## 2018-03-05 ASSESSMENT — PAIN SCALES - GENERAL
PAINLEVEL_OUTOF10: 0
PAINLEVEL_OUTOF10: 0
PAINLEVEL_OUTOF10: 6
PAINLEVEL_OUTOF10: 6

## 2018-03-05 ASSESSMENT — PAIN DESCRIPTION - LOCATION: LOCATION: BACK

## 2018-03-05 NOTE — FLOWSHEET NOTE
Returned to one [employer]. Pt able to complete MRI testing per tech. On coming RN updated to update Dr Belen Pretty once results available.  Electronically signed by David Norris RN on 3/4/2018 at 7:51 PM

## 2018-03-05 NOTE — PROGRESS NOTES
100 mg at 03/03/18 0948    docusate sodium (COLACE) capsule 100 mg  100 mg Oral BID Micki Anthony MD   100 mg at 03/05/18 1101    ondansetron (ZOFRAN) injection 4 mg  4 mg Intravenous Q6H PRN Micki Anthony MD        enoxaparin (LOVENOX) injection 105 mg  1 mg/kg Subcutaneous BID Micki Anthony MD   105 mg at 03/05/18 0900    morphine injection 2 mg  2 mg Intravenous Q2H PRN Micki Anthony MD           OBJECTIVE  PHYSICAL EXAM:   BP (!) 156/76   Pulse 76   Temp 97.7 °F (36.5 °C) (Oral)   Resp 20   Ht 5' 8\" (1.727 m)   Wt 234 lb 12.6 oz (106.5 kg)   SpO2 94%   BMI 35.70 kg/m²   Body mass index is 35.7 kg/m². CONSTITUTIONAL:  awake, alert, cooperative, no apparent distress, and appears stated age  EYES:  Lids and lashes normal, pupils equal, round and reactive to light, extra ocular muscles intact, sclera clear, conjunctiva normal  LUNGS:  No increased work of breathing, good air exchange, clear to auscultation bilaterally, no crackles or wheezing  CARDIOVASCULAR:  Normal apical impulse, regular rate and rhythm, normal S1 and S2, no S3 or S4, and no murmur noted  ABDOMEN:  No scars, normal bowel sounds, soft, non-distended, non-tender, no masses palpated, no hepatosplenomegally  EXT: No c/c/e    DATA:   Diagnostic tests reviewed for today's visit:    Most recent labs and imaging results reviewed.      SIGNATURE: Margie Rojas MD PATIENT NAME: Mari aM Turcios   DATE: March 5, 2018 MRN: [id number]   TIME: 4:07 PM PAGER: [phone]

## 2018-03-06 LAB
GLUCOSE BLD-MCNC: 129 MG/DL (ref 60–115)
GLUCOSE BLD-MCNC: 227 MG/DL (ref 60–115)
GLUCOSE BLD-MCNC: 271 MG/DL (ref 60–115)
GLUCOSE BLD-MCNC: 425 MG/DL (ref 60–115)
GLUCOSE BLD-MCNC: 70 MG/DL (ref 60–115)
GLUCOSE BLD-MCNC: 78 MG/DL (ref 60–115)
GLUCOSE BLD-MCNC: 89 MG/DL (ref 60–115)
INR BLD: 2.9
PERFORMED ON: ABNORMAL
PERFORMED ON: NORMAL
PROTHROMBIN TIME: 30.3 SEC (ref 8.1–13.7)

## 2018-03-06 PROCEDURE — 6370000000 HC RX 637 (ALT 250 FOR IP): Performed by: INTERNAL MEDICINE

## 2018-03-06 PROCEDURE — G8987 SELF CARE CURRENT STATUS: HCPCS

## 2018-03-06 PROCEDURE — 2060000000 HC ICU INTERMEDIATE R&B

## 2018-03-06 PROCEDURE — G8988 SELF CARE GOAL STATUS: HCPCS

## 2018-03-06 PROCEDURE — 6370000000 HC RX 637 (ALT 250 FOR IP): Performed by: HOSPITALIST

## 2018-03-06 PROCEDURE — G8978 MOBILITY CURRENT STATUS: HCPCS

## 2018-03-06 PROCEDURE — 97166 OT EVAL MOD COMPLEX 45 MIN: CPT

## 2018-03-06 PROCEDURE — G8979 MOBILITY GOAL STATUS: HCPCS

## 2018-03-06 PROCEDURE — 97162 PT EVAL MOD COMPLEX 30 MIN: CPT

## 2018-03-06 PROCEDURE — 99232 SBSQ HOSP IP/OBS MODERATE 35: CPT | Performed by: INTERNAL MEDICINE

## 2018-03-06 PROCEDURE — G8989 SELF CARE D/C STATUS: HCPCS

## 2018-03-06 PROCEDURE — 36415 COLL VENOUS BLD VENIPUNCTURE: CPT

## 2018-03-06 PROCEDURE — 85610 PROTHROMBIN TIME: CPT

## 2018-03-06 PROCEDURE — 2580000003 HC RX 258: Performed by: INTERNAL MEDICINE

## 2018-03-06 RX ORDER — POLYETHYLENE GLYCOL 3350 17 G/17G
17 POWDER, FOR SOLUTION ORAL DAILY
Status: DISCONTINUED | OUTPATIENT
Start: 2018-03-06 | End: 2018-03-09 | Stop reason: HOSPADM

## 2018-03-06 RX ADMIN — Medication 10 ML: at 00:31

## 2018-03-06 RX ADMIN — PROPRANOLOL HYDROCHLORIDE 60 MG: 60 CAPSULE, EXTENDED RELEASE ORAL at 08:20

## 2018-03-06 RX ADMIN — PRIMIDONE 50 MG: 50 TABLET ORAL at 22:19

## 2018-03-06 RX ADMIN — PRAVASTATIN SODIUM 80 MG: 80 TABLET ORAL at 22:20

## 2018-03-06 RX ADMIN — NORTRIPTYLINE HYDROCHLORIDE 75 MG: 25 CAPSULE ORAL at 22:19

## 2018-03-06 RX ADMIN — INSULIN HUMAN 90 UNITS: 100 INJECTION, SUSPENSION SUBCUTANEOUS at 17:07

## 2018-03-06 RX ADMIN — POTASSIUM CHLORIDE 10 MEQ: 1.5 POWDER, FOR SOLUTION ORAL at 08:19

## 2018-03-06 RX ADMIN — ISOSORBIDE MONONITRATE 30 MG: 30 TABLET, EXTENDED RELEASE ORAL at 08:20

## 2018-03-06 RX ADMIN — PRIMIDONE 50 MG: 50 TABLET ORAL at 00:30

## 2018-03-06 RX ADMIN — OXYCODONE HYDROCHLORIDE AND ACETAMINOPHEN 1 TABLET: 5; 325 TABLET ORAL at 07:24

## 2018-03-06 RX ADMIN — DEXTROSE MONOHYDRATE 12.5 G: 25 INJECTION, SOLUTION INTRAVENOUS at 20:12

## 2018-03-06 RX ADMIN — PANTOPRAZOLE SODIUM 40 MG: 40 TABLET, DELAYED RELEASE ORAL at 07:24

## 2018-03-06 RX ADMIN — DOCUSATE SODIUM 100 MG: 100 CAPSULE, LIQUID FILLED ORAL at 22:20

## 2018-03-06 RX ADMIN — INSULIN HUMAN 90 UNITS: 100 INJECTION, SUSPENSION SUBCUTANEOUS at 08:20

## 2018-03-06 RX ADMIN — POLYETHYLENE GLYCOL 3350 17 G: 17 POWDER, FOR SOLUTION ORAL at 18:54

## 2018-03-06 RX ADMIN — PROPRANOLOL HYDROCHLORIDE 60 MG: 60 CAPSULE, EXTENDED RELEASE ORAL at 22:19

## 2018-03-06 RX ADMIN — HYDROCHLOROTHIAZIDE 25 MG: 25 TABLET ORAL at 08:19

## 2018-03-06 RX ADMIN — Medication 10 ML: at 11:27

## 2018-03-06 RX ADMIN — NORTRIPTYLINE HYDROCHLORIDE 75 MG: 25 CAPSULE ORAL at 00:31

## 2018-03-06 RX ADMIN — ASPIRIN 81 MG: 81 TABLET, COATED ORAL at 08:20

## 2018-03-06 RX ADMIN — DOCUSATE SODIUM 100 MG: 100 CAPSULE, LIQUID FILLED ORAL at 08:20

## 2018-03-06 RX ADMIN — FOLIC ACID 1 MG: 1 TABLET ORAL at 08:20

## 2018-03-06 RX ADMIN — Medication 10 ML: at 22:20

## 2018-03-06 RX ADMIN — OXYCODONE HYDROCHLORIDE AND ACETAMINOPHEN 1 TABLET: 5; 325 TABLET ORAL at 22:20

## 2018-03-06 RX ADMIN — FUROSEMIDE 20 MG: 20 TABLET ORAL at 08:20

## 2018-03-06 RX ADMIN — PRAVASTATIN SODIUM 80 MG: 80 TABLET ORAL at 00:30

## 2018-03-06 RX ADMIN — LEVOTHYROXINE SODIUM 150 MCG: 150 TABLET ORAL at 07:24

## 2018-03-06 RX ADMIN — WARFARIN SODIUM 2.5 MG: 2.5 TABLET ORAL at 18:54

## 2018-03-06 RX ADMIN — WARFARIN SODIUM 2.5 MG: 2.5 TABLET ORAL at 00:31

## 2018-03-06 ASSESSMENT — PAIN DESCRIPTION - PROGRESSION: CLINICAL_PROGRESSION: NOT CHANGED

## 2018-03-06 ASSESSMENT — PAIN SCALES - GENERAL
PAINLEVEL_OUTOF10: 6
PAINLEVEL_OUTOF10: 0
PAINLEVEL_OUTOF10: 6
PAINLEVEL_OUTOF10: 3

## 2018-03-06 ASSESSMENT — PAIN DESCRIPTION - PAIN TYPE: TYPE: CHRONIC PAIN

## 2018-03-06 ASSESSMENT — PAIN DESCRIPTION - LOCATION: LOCATION: BACK

## 2018-03-06 ASSESSMENT — PAIN DESCRIPTION - ORIENTATION: ORIENTATION: LOWER

## 2018-03-06 ASSESSMENT — PAIN DESCRIPTION - ONSET: ONSET: ON-GOING

## 2018-03-06 ASSESSMENT — PAIN DESCRIPTION - FREQUENCY: FREQUENCY: CONTINUOUS

## 2018-03-06 ASSESSMENT — PAIN DESCRIPTION - DESCRIPTORS: DESCRIPTORS: ACHING

## 2018-03-06 NOTE — PROGRESS NOTES
with pain, lung cancer, and diabetic neuropathy. Lower dose of Humulin N to 80 twice a day. Humalog 10 with each meal.  Continue sliding scale. Goal will be to get her sugars between 140 to 200 range. Conor further recs     Essential hypertension   Acquired hypothyroidism  CAD    Dispo: Await clinical improvement and consultant clearance prior to discharge. Advised F/U with PCP 1 - 2 days of discharge      SUBJECTIVE  CHIEF COMPLAINT: chest pain    PRIMARY SERVICE: Medicine    INTERVAL HPI: Pt says she still feels weak but chest pain better.      MEDICATIONS:    Current Facility-Administered Medications   Medication Dose Route Frequency Provider Last Rate Last Dose    sodium chloride flush 0.9 % injection 10 mL  10 mL Intravenous PRN Sumanth Bar MD   20 mL at 03/05/18 0805    insulin lispro (HUMALOG) injection vial 20 Units  20 Units Subcutaneous TID YULI Suarez MD   20 Units at 03/06/18 1217    insulin NPH (HUMULIN N;NOVOLIN N) injection vial 90 Units  90 Units Subcutaneous BID  Malvin Suarez MD   90 Units at 03/06/18 0820    aspirin EC tablet 81 mg  81 mg Oral Daily Bill Broussard MD   81 mg at 03/06/18 0820    nortriptyline (PAMELOR) capsule 75 mg  75 mg Oral Nightly Vincent Ragsdale MD   75 mg at 03/06/18 0031    isosorbide mononitrate (IMDUR) extended release tablet 30 mg  30 mg Oral Daily Vincent Ragsdale MD   30 mg at 03/06/18 0820    oxyCODONE-acetaminophen (PERCOCET) 5-325 MG per tablet 1 tablet  1 tablet Oral Q4H PRN Sumanth Bar MD   1 tablet at 03/06/18 0724    insulin lispro (HUMALOG) injection vial 0-12 Units  0-12 Units Subcutaneous TID  Malvin Burrell MD   4 Units at 03/06/18 1216    insulin lispro (HUMALOG) injection vial 0-6 Units  0-6 Units Subcutaneous Nightly Malvin Suarez MD   6 Units at 03/06/18 0036    nitroGLYCERIN (NITROSTAT) SL tablet 0.4 mg  0.4 mg Sublingual Q5 Min PRN Pao Vangie, CNP   0.4 mg at 03/02/18 1247    albuterol sulfate  (90 Base) MCG/ACT inhaler 2 puff  2 puff Inhalation Q4H PRN Danii Wills MD        furosemide (LASIX) tablet 20 mg  20 mg Oral Daily Danii Wills MD   20 mg at 03/06/18 0820    hydrochlorothiazide (HYDRODIURIL) tablet 25 mg  25 mg Oral Daily Danii Wills MD   25 mg at 03/06/18 0819    hydrOXYzine (ATARAX) tablet 25 mg  25 mg Oral Q8H PRN Abilio Price MD   25 mg at 03/04/18 1804    levothyroxine (SYNTHROID) tablet 150 mcg  150 mcg Oral Daily Abilio Price MD   150 mcg at 03/06/18 0724    pantoprazole (PROTONIX) tablet 40 mg  40 mg Oral QAM AC Danii Wills MD   40 mg at 03/06/18 0724    potassium chloride (KLOR-CON) packet 10 mEq  10 mEq Oral Daily Danii Wills MD   10 mEq at 03/06/18 0819    pravastatin (PRAVACHOL) tablet 80 mg  80 mg Oral Nightly Abilio Price MD   80 mg at 03/06/18 0030    primidone (MYSOLINE) tablet 50 mg  50 mg Oral Nightly Abilio Price MD   50 mg at 03/06/18 0030    propranolol (INDERAL LA) extended release capsule 60 mg  60 mg Oral BID Danii Wills MD   60 mg at 03/06/18 0820    warfarin (COUMADIN) tablet 2.5 mg  2.5 mg Oral Once per day on Sun Mon Tue Thu Fri Abilio Price MD   2.5 mg at 03/06/18 0031    warfarin (COUMADIN) tablet 5 mg  5 mg Oral Once per day on Wed Sat Abilio Price MD   5 mg at 03/04/18 2153    glucose (GLUTOSE) 40 % oral gel 15 g  15 g Oral PRN Danii Wills MD        dextrose 50 % solution 12.5 g  12.5 g Intravenous PRN Danii Wills MD        glucagon (rDNA) injection 1 mg  1 mg Intramuscular PRN Danii Wills MD        dextrose 5 % solution  100 mL/hr Intravenous PRN Danii Wills MD        sodium chloride flush 0.9 % injection 10 mL  10 mL Intravenous 2 times per day Danii Wills MD   10 mL at 03/06/18 1127    sodium chloride flush 0.9 % injection 10 mL  10 mL Intravenous PRN Danii Wills MD        acetaminophen (TYLENOL) tablet 650 mg  650 mg Oral Q4H PRN MD Brigido Calero

## 2018-03-06 NOTE — CONSULTS
[address]                       [address], [address] [facility]                                   CONSULTATION    PATIENT NAME: Va Oro                   :        1945  MED REC NO:   [id number]                            ROOM:       Z961  ACCOUNT NO:   [de-identified]                           ADMIT DATE: 2018  PROVIDER:     Arpan Gonzalez MD    CONSULT DATE:  2018    ATTENDING:  Micki Anthony MD    HISTORY OF PRESENT ILLNESS:  This is a 80-year-old right-handed female  admitted with a history of chest pain. I am consulted as the patient has a  history of difficulty walking. The patient has had some walking difficulty  for a few years. She has had chemotherapy and in the last 4 years, she  used walker and cane. When she got out of bed, she was almost bumping her  leg. We were therefore consulted. As noted, she has numbness in her legs  which is not new. She denies any back pain. She has no bowel or bladder  dysfunction. She does have some numbness in her hands as well, some  occasional tremors in her hand. She has not had any stiffness though. She  has not had falls, injuries, or trauma. At home, she does walk some  around. She has not significantly worsened. She denies any chest pain at  this time. She denies any bleeding, bruising, choking, or drooling. She  has not had any injuries or trauma. The patient denies any memory issues. Denied difficulty swallowing. Last MRI was done by me in 2017 which shows small ischemic changes. There was no metastasis at that time. We had seen her likely as an  outpatient. 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 Her other consultations are obtained from August, and we had seen her for  the same. PAST MEDICAL HISTORY:  Remarkable for malignancy.   The patient has history  of lung cancer, 12.3.  I do not  find anything other and the examination of the lumbar spine MRI is  essentially normal with some bony metastases, but the CT scan of the head  is normal.    The patient will require aggressive physical therapy for now. The patient has history of essential tremors. She is on primidone. 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 Somehow, this has been  discontinued. We will continue to keep observation and follow her with you. Thank you Dr. Debra Loomis for asking us to assist in the care the patient.         Adelia Denton MD    D: 03/06/2018 10:38:58       T: 03/06/2018 13:13:24     [email or web address]  Job#: [id number]     Doc#: [id number]    CC:

## 2018-03-06 NOTE — PROGRESS NOTES
SUBJECTIVE: Subjective: \"My daughter takes care of me. \"  Pre Treatment Pain Screening  Comments / Details: denies    Post Treatment Pain Screening:   Pain Assessment  Pain Assessment:  (denies)    Prior Level of Function:  Social/Functional History  Lives With: Daughter  Type of Home: House  Home Layout: One level  Home Access: Stairs to enter with rails  Entrance Stairs - Number of Steps: 4  Home Equipment: Rolling walker, Cane  Ambulation Assistance: Independent  Transfer Assistance: Independent  Additional Comments: Pt states that her daughter assists PRN on bad days    OBJECTIVE:   Vision/Hearing:  Vision: Within Functional Limits  Hearing: Within functional limits    Cognition:  Overall Orientation Status: Within Functional Limits  Follows Commands: Within Functional Limits    Observation/Palpation  Observation: no acute distress noted; essential tremor    ROM:  RLE PROM: WFL  LLE PROM: WFL  RUE PROM: WFL  LUE PROM: WFL    Strength:  Strength Other  Other: Noted LE weakness during standing/ambulatory tasks as follows    Neuro:  Balance  Comments: Pt with multidirectional instability with delayed/absent righting responses. Motor Control  Gross Motor?: WFL  Sensation  Overall Sensation Status:  (pt descrbes chronic diabetic neuropathy)    Bed mobility  Rolling to Left: Modified independent  Supine to Sit: Modified independent  Comment: Increased time and effort to complete    Transfers  Sit to Stand: Contact guard assistance  Stand to sit: Contact guard assistance  Comment: Increased time to complete and stabilize in standing. Increased tremoring noted. Ambulation  Ambulation?: Yes  Ambulation 1  Device: Hand-Held Assist  Assistance: Contact guard assistance;Minimal assistance  Quality of Gait: Pt with multidirectional instability and inconsisent pattern requiring B UE support and stability throughout trunk to cover distance safely. Pt states that her knees buckle randomly. Had a fall 2 days ago.

## 2018-03-06 NOTE — PROGRESS NOTES
[facility] OCCUPATIONAL THERAPY EVALUATION - ACUTE     Date: 3/6/2018  Patient Name: Yajaira Ye        MRN: [id number]  Account: [de-identified]   : 1945  (68 y.o.)  Room: [place] [address]    Chart Review:  Diagnosis:  The encounter diagnosis was Chest pain, unspecified type. Past Medical History:   Diagnosis Date    Atherosclerosis of native coronary artery     Benign familial tremor     CAD (coronary artery disease)     Cancer (HCC)     COPD (chronic obstructive pulmonary disease) (HCC)     De Quervain's tenosynovitis, right     Diabetes mellitus ([address]     Diabetic neuropathy ([address]     [facility]) [facility] 10/2016    left leg     Hypertension     Lung cancer ([address]     Metastatic lung cancer (metastasis from lung to other site) ([facility]     bilat    Nuclear sclerosis of left eye     Osteoporosis     Peptic ulcer     Pseudophakia     Thyroid disease      Past Surgical History:   Procedure Laterality Date    BREAST SURGERY Left     biopsy    CORONARY ANGIOPLASTY WITH STENT PLACEMENT      EYE SURGERY      HYSTERECTOMY      LUNG REMOVAL, PARTIAL      left      Precautions:  Fall  Restrictions/Precautions: Fall Risk    Evaluation and Pt. rights have been reviewed: [x]Yes   [] No   If no why not:   Falls safety interventions in place  []Yes   [x] No    Comments:     Subjective: Pt seen with daughter present    Prior living arrangement:      Support contact: Daughter    Pt lives: [] Alone   [] With spouse   [x] Other   Comment: Daughter   Home: [x] Single level   []  Two level   []  Split level     []  Apartment:     Entrance:  Stairs: 4 Hand rails 1,    Inside: Stairs:   Hand rails:    Bathroom: [] Bath tub   [] Tub/Shower combo   [x]  Shower stall    Location:      DME: [x] W/W   [x] [place] [facility]   [] Rollator   []  W/C   [x] Lesta Ratel   [] Shower Chair   [] [facility]  [] Dressing  AE  [] Other:Hand held shower    Previous Functional Status:  Pt reports assist prn with adl completion.   Pt has palliative care at home. Pain:   Start of session: 0/10  Description:    Location:    End of session: 0/10  Action: [x] No Action Necessary    [] Patient reports pain at acceptable level for treatment  [] Nursing notified    [] Other      Objective:  Observation:  Pt supine in bed.   Pt with resting tremors noted    Orientation: Oriented to  [x] Person   [x] Place  [x]Time    Vision:   [x]  WFL   [] Impaired  Comments:      Hearing:  [x] WFL   [] Impaired  Comments:    Sensation:   [] WFL   [x]  Impaired   Comments:   Reports neuropathy in bilateral LE's   Cognition:   [x] WFL   [] Impaired  Comments:    Communication:   [x] WFL   [] Impaired  Comments:    Hand Dominance: [] Right   [x] Left    Range of Motion:  R UE AROM/PROM: [x]  WFL [] Impaired  Comments:   L UE AROM/PROM:  [x]  WFL [] Impaired  Comments:     Strength:   R UE Strength: []1    [] 2   [] 2+   [] 3   [] 3+   [x] 4   [] 4+  [] 5  Comments:   L UE Strength:  []1    [] 2   [] 2+   [] 3   [] 3+  [x] 4   [] 4+   [] 5  Comments:     Quality of Movement:  [x] Good   [] Fair   [] Poor     Coordination:  Gross motor: [x] WFL   [] Impaired   Fine motor: [x] WFL   [] Impaired     Functional Mobility:  Toilet Transfers:  [facility]  Bed Transfer:  supervision  Sit to stand: [facility]  Bed to Chair:  [facility]    Seated Balance:      Static: [x] Good  [] Fair   [] Poor   Dynamic: []  Good  [x] Fair +  [] Poor     Standing Balance:     Static: [] Good   [x] Fair  [] Poor   Dynamic: [] Good   [x] Fair   [] Poor     Functional Endurance: [] Good  [x] Fair-  [] Poor     ADLs  Feeding:   Set up  UE Dressing:  SBA  LB Dressing:  MIN A  Bathing:  Min A  Toileting: SBA  Grooming: Set up    Patient Goal: To return to home with daughter  Discussed and agreed upon: [x] Yes   [] No         Comments:     Assessment/Discharge Disposition:     Prognosis: Fair  Discharge Recommendations: Home with Home health OT, Continue to assess pending progress  No Skilled OT: At baseline

## 2018-03-07 LAB
CREAT SERPL-MCNC: 1.11 MG/DL (ref 0.5–0.9)
GFR AFRICAN AMERICAN: 58.5
GFR NON-AFRICAN AMERICAN: 48.3
GLUCOSE BLD-MCNC: 120 MG/DL (ref 60–115)
GLUCOSE BLD-MCNC: 122 MG/DL (ref 60–115)
GLUCOSE BLD-MCNC: 243 MG/DL (ref 60–115)
GLUCOSE BLD-MCNC: 257 MG/DL (ref 60–115)
GLUCOSE BLD-MCNC: 56 MG/DL (ref 60–115)
INR BLD: 3.3
PERFORMED ON: ABNORMAL
PROTHROMBIN TIME: 35.2 SEC (ref 8.1–13.7)

## 2018-03-07 PROCEDURE — 6370000000 HC RX 637 (ALT 250 FOR IP): Performed by: HOSPITALIST

## 2018-03-07 PROCEDURE — 85610 PROTHROMBIN TIME: CPT

## 2018-03-07 PROCEDURE — 97116 GAIT TRAINING THERAPY: CPT

## 2018-03-07 PROCEDURE — 99231 SBSQ HOSP IP/OBS SF/LOW 25: CPT | Performed by: PHYSICIAN ASSISTANT

## 2018-03-07 PROCEDURE — 2580000003 HC RX 258: Performed by: INTERNAL MEDICINE

## 2018-03-07 PROCEDURE — 97112 NEUROMUSCULAR REEDUCATION: CPT

## 2018-03-07 PROCEDURE — 6370000000 HC RX 637 (ALT 250 FOR IP): Performed by: INTERNAL MEDICINE

## 2018-03-07 PROCEDURE — 2060000000 HC ICU INTERMEDIATE R&B

## 2018-03-07 PROCEDURE — 36415 COLL VENOUS BLD VENIPUNCTURE: CPT

## 2018-03-07 PROCEDURE — 82565 ASSAY OF CREATININE: CPT

## 2018-03-07 RX ADMIN — HYDROCHLOROTHIAZIDE 25 MG: 25 TABLET ORAL at 08:40

## 2018-03-07 RX ADMIN — INSULIN HUMAN 90 UNITS: 100 INJECTION, SUSPENSION SUBCUTANEOUS at 16:58

## 2018-03-07 RX ADMIN — POLYETHYLENE GLYCOL 3350 17 G: 17 POWDER, FOR SOLUTION ORAL at 08:39

## 2018-03-07 RX ADMIN — FUROSEMIDE 20 MG: 20 TABLET ORAL at 08:40

## 2018-03-07 RX ADMIN — POTASSIUM CHLORIDE 10 MEQ: 1.5 POWDER, FOR SOLUTION ORAL at 08:43

## 2018-03-07 RX ADMIN — ISOSORBIDE MONONITRATE 30 MG: 30 TABLET, EXTENDED RELEASE ORAL at 08:40

## 2018-03-07 RX ADMIN — DEXTROSE MONOHYDRATE 12.5 G: 25 INJECTION, SOLUTION INTRAVENOUS at 06:01

## 2018-03-07 RX ADMIN — PANTOPRAZOLE SODIUM 40 MG: 40 TABLET, DELAYED RELEASE ORAL at 06:00

## 2018-03-07 RX ADMIN — PRAVASTATIN SODIUM 80 MG: 80 TABLET ORAL at 21:32

## 2018-03-07 RX ADMIN — WARFARIN SODIUM 5 MG: 2.5 TABLET ORAL at 16:58

## 2018-03-07 RX ADMIN — PROPRANOLOL HYDROCHLORIDE 60 MG: 60 CAPSULE, EXTENDED RELEASE ORAL at 21:32

## 2018-03-07 RX ADMIN — OXYCODONE HYDROCHLORIDE AND ACETAMINOPHEN 1 TABLET: 5; 325 TABLET ORAL at 14:28

## 2018-03-07 RX ADMIN — Medication 10 ML: at 21:32

## 2018-03-07 RX ADMIN — NORTRIPTYLINE HYDROCHLORIDE 75 MG: 25 CAPSULE ORAL at 21:31

## 2018-03-07 RX ADMIN — OXYCODONE HYDROCHLORIDE AND ACETAMINOPHEN 1 TABLET: 5; 325 TABLET ORAL at 21:32

## 2018-03-07 RX ADMIN — Medication 10 ML: at 08:39

## 2018-03-07 RX ADMIN — DOCUSATE SODIUM 100 MG: 100 CAPSULE, LIQUID FILLED ORAL at 08:39

## 2018-03-07 RX ADMIN — DOCUSATE SODIUM 100 MG: 100 CAPSULE, LIQUID FILLED ORAL at 21:32

## 2018-03-07 RX ADMIN — LEVOTHYROXINE SODIUM 150 MCG: 150 TABLET ORAL at 06:00

## 2018-03-07 RX ADMIN — PROPRANOLOL HYDROCHLORIDE 60 MG: 60 CAPSULE, EXTENDED RELEASE ORAL at 08:40

## 2018-03-07 RX ADMIN — PRIMIDONE 50 MG: 50 TABLET ORAL at 21:32

## 2018-03-07 RX ADMIN — ASPIRIN 81 MG: 81 TABLET, COATED ORAL at 08:40

## 2018-03-07 ASSESSMENT — PAIN DESCRIPTION - ORIENTATION
ORIENTATION: LOWER

## 2018-03-07 ASSESSMENT — PAIN DESCRIPTION - DESCRIPTORS
DESCRIPTORS: ACHING
DESCRIPTORS: ACHING

## 2018-03-07 ASSESSMENT — PAIN DESCRIPTION - LOCATION
LOCATION: BACK

## 2018-03-07 ASSESSMENT — PAIN DESCRIPTION - FREQUENCY: FREQUENCY: CONTINUOUS

## 2018-03-07 ASSESSMENT — PAIN DESCRIPTION - PROGRESSION: CLINICAL_PROGRESSION: NOT CHANGED

## 2018-03-07 ASSESSMENT — PAIN SCALES - GENERAL
PAINLEVEL_OUTOF10: 6
PAINLEVEL_OUTOF10: 0
PAINLEVEL_OUTOF10: 6
PAINLEVEL_OUTOF10: 0
PAINLEVEL_OUTOF10: 8

## 2018-03-07 ASSESSMENT — PAIN DESCRIPTION - PAIN TYPE
TYPE: CHRONIC PAIN
TYPE: CHRONIC PAIN

## 2018-03-07 ASSESSMENT — PAIN DESCRIPTION - ONSET: ONSET: ON-GOING

## 2018-03-07 NOTE — PROGRESS NOTES
[place], CNP   0.4 mg at 03/02/18 1247    albuterol sulfate  (90 Base) MCG/ACT inhaler 2 puff  2 puff Inhalation Q4H PRN Valentine Mendoza MD        furosemide (LASIX) tablet 20 mg  20 mg Oral Daily Valentine Mendoza MD   20 mg at 03/07/18 0840    hydrochlorothiazide (HYDRODIURIL) tablet 25 mg  25 mg Oral Daily Valentine Mendoza MD   25 mg at 03/07/18 0840    hydrOXYzine (ATARAX) tablet 25 mg  25 mg Oral Q8H PRN Hiral Sosa MD   25 mg at 03/04/18 1804    levothyroxine (SYNTHROID) tablet 150 mcg  150 mcg Oral Daily Hiral Sosa MD   150 mcg at 03/07/18 0600    pantoprazole (PROTONIX) tablet 40 mg  40 mg Oral QAM AC Valentine Mendoza MD   40 mg at 03/07/18 0600    potassium chloride (KLOR-CON) packet 10 mEq  10 mEq Oral Daily Valentine Mendoza MD   10 mEq at 03/07/18 0843    pravastatin (PRAVACHOL) tablet 80 mg  80 mg Oral Nightly Hiral Sosa MD   80 mg at 03/06/18 2220    primidone (MYSOLINE) tablet 50 mg  50 mg Oral Nightly Hiral Sosa MD   50 mg at 03/06/18 2219    propranolol (INDERAL LA) extended release capsule 60 mg  60 mg Oral BID Valentine Mendoza MD   60 mg at 03/07/18 0840    warfarin (COUMADIN) tablet 2.5 mg  2.5 mg Oral Once per day on Sun Mon Tue Thu Fri Hiral Sosa MD   2.5 mg at 03/06/18 1854    warfarin (COUMADIN) tablet 5 mg  5 mg Oral Once per day on Wed Sat Hiral Sosa MD   5 mg at 03/04/18 2153    glucose (GLUTOSE) 40 % oral gel 15 g  15 g Oral PRN Valentine Mendoza MD        dextrose 50 % solution 12.5 g  12.5 g Intravenous PRN Valentine Mendoza MD   12.5 g at 03/07/18 0601    glucagon (rDNA) injection 1 mg  1 mg Intramuscular PRN Valentine Mendoza MD        dextrose 5 % solution  100 mL/hr Intravenous PRN Valentine Mendoza MD        sodium chloride flush 0.9 % injection 10 mL  10 mL Intravenous 2 times per day Valentine Mendoza MD   10 mL at 03/07/18 0833    sodium chloride flush 0.9 % injection 10 mL  10 mL Intravenous PRN Alfred Nicole Gala Avila MD        acetaminophen (TYLENOL) tablet 650 mg  650 mg Oral Q4H PRN Radha Zabala MD        traMADol Mya Flower) tablet 100 mg  100 mg Oral Q6H PRN Radha Zabala MD   100 mg at 03/03/18 0948    docusate sodium (COLACE) capsule 100 mg  100 mg Oral BID Radha Zabala MD   100 mg at 03/07/18 0839    ondansetron (ZOFRAN) injection 4 mg  4 mg Intravenous Q6H PRN Radha Zabala MD        morphine injection 2 mg  2 mg Intravenous Q2H PRN Radha Zabala MD           OBJECTIVE  PHYSICAL EXAM:   BP (!) 133/47   Pulse 63   Temp 97.4 °F (36.3 °C) (Oral)   Resp 18   Ht 5' 8\" (1.727 m)   Wt 234 lb 12.6 oz (106.5 kg)   SpO2 100%   BMI 35.70 kg/m²   Body mass index is 35.7 kg/m². CONSTITUTIONAL:  awake, alert, cooperative, no apparent distress, and appears stated age  EYES:  Lids and lashes normal, pupils equal, round and reactive to light, extra ocular muscles intact, sclera clear, conjunctiva normal  LUNGS:  No increased work of breathing, good air exchange, clear to auscultation bilaterally, no crackles or wheezing  CARDIOVASCULAR:  Normal apical impulse, regular rate and rhythm, normal S1 and S2, no S3 or S4, and no murmur noted  ABDOMEN:  No scars, normal bowel sounds, soft, non-distended, non-tender, no masses palpated, no hepatosplenomegally  EXT: No c/c/e    DATA:   Diagnostic tests reviewed for today's visit:    Most recent labs and imaging results reviewed.      SIGNATURE: Talat Sims MD PATIENT NAME: Sindhu Martinez   DATE: March 7, 2018 MRN: [id number]   TIME: 12:28 PM PAGER: [phone]

## 2018-03-07 NOTE — HOME CARE
conditions requiring Home Care     Homebound Status: further, I certify that my clinical findings support that [place] does meet the Medicare definition of \"Confined to the Home\" because of   Unable to leave home without maximum effort and requires assistance of wheelchair or walker  Unsteady gait or dizziness that requires assistance of wheelchair, walker, or assistance of another person  Weakness and/or pain and activities are restricted or limited  Severe confusion, disorientation, or compromised mental state     Certification and medical necessity: I certify that, based on my findings, the following services are medically necessary home health services for [place] for the following reasons:  - Consult Physical Therapy for  Evaluate and Treat  - Consult Occupational Therapy for  Evaluate and Treat  - [address] [facility] for [address] [place] [address] with Activities of Daily Living  - Nursing: Med management                                 Home Care Coordinator: RN Signature Electronically signed by Dilcia Jones RN on 3/7/18 at 3:35 PM 3/7/2018

## 2018-03-07 NOTE — PROGRESS NOTES
Physical Therapy Med Surg Daily Treatment Note  Facility/Department: [facility]  [facility]       NAME: Grant Esquivel  : 1945 (68 y.o.)  MRN: [id number]  CODE STATUS: Full Code    Date of Service: 3/7/2018    Patient Diagnosis(es): Chest pain [R07.9]  Chest pain [R07.9]   Chief Complaint   Patient presents with    Chest Pain     started 5 days ago. Pain left chest radiates to left neck. Pain is intermittent  and stabbing.   Pt traveled to [place] on  and returned on  by plane     Patient Active Problem List    Diagnosis Date Noted    Chest pain 2018    Lung cancer ([address] 2016    Acquired hypothyroidism 2016    Uncontrolled type 2 diabetes mellitus with complication, with long-term current use of insulin ([address] 2016    Essential hypertension 2016    CAD in native artery 2016        Past Medical History:   Diagnosis Date    Atherosclerosis of native coronary artery     Benign familial tremor     CAD (coronary artery disease)     Cancer ([address]     COPD (chronic obstructive pulmonary disease) ([address]     De Quervain's tenosynovitis, right     Diabetes mellitus ([address]     Diabetic neuropathy ([address]     DVT (deep venous thrombosis) ([address] 10/2016    left leg     Hypertension     Lung cancer ([address]     Metastatic lung cancer (metastasis from lung to other site) ([address]     bilat    Nuclear sclerosis of left eye     Osteoporosis     Peptic ulcer     Pseudophakia     Thyroid disease      Past Surgical History:   Procedure Laterality Date    BREAST SURGERY Left     biopsy    CORONARY ANGIOPLASTY WITH STENT PLACEMENT      EYE SURGERY      HYSTERECTOMY      LUNG REMOVAL, PARTIAL      left          Restrictions  Restrictions/Precautions: Fall Risk    Subjective   General  Chart Reviewed: Yes  Family / Caregiver Present: Yes (daughter present )       Pain Screening  Patient Currently in Pain: Yes     Pain Reassessment: 4/10  Pain Assessment  Pain Assessment: 0-10  Pain Level: 6  Pain Location: Back  Pain Orientation: Lower  Pain Descriptors: Aching  Objective   Bed mobility  Rolling to Left: Modified independent  Rolling to Right: Modified independent  Supine to Sit: Modified independent  Sit to Supine: Modified independent  Scooting: Modified independent    Transfers  Sit to Stand: Stand by assistance  Stand to sit: Stand by assistance    Ambulation  Ambulation?: Yes  Ambulation 1  Device: Rolling Walker  Assistance: Stand by assistance  Quality of Gait: flexed forward posture, relies heavily on UE support, 0 LOB, decreased speed   Distance: 40'x2   Stairs/Curb  Stairs?: No (safety concerns)  Balance  Comments: static standing balance without UE support  Exercises  Comments: sit to stand x5        Assessment   Body structures, Functions, Activity limitations: Decreased functional mobility ; Decreased safe awareness;Decreased balance;Decreased coordination;Decreased endurance;Decreased strength;Decreased ADL status; Decreased sensation  Assessment: pt request to use ww due to feeling unsteady this PM, challenged by static balance activities however without LOB   Prognosis: Good  Patient Education: PT POC; DC recs; role of PT in the hosp  REQUIRES PT FOLLOW UP: Yes  Activity Tolerance  Activity Tolerance: Patient Tolerated treatment well     Discharge Recommendations:  Continue to assess pending progress, Patient would benefit from continued therapy after discharge    Goals  Short term goals  Short term goal 1: Pt to complete HEP with indep  Long term goals  Long term goal 1: Pt to complete all bed mobility with indep  Long term goal 2: Pt to complete all tranfsers with indep  Long term goal 3: Pt to ambulate 50ft with [employer] and indep  Long term goal 4: Pt to manage 4 steps with HR and CGA    Plan    Plan  Times per week: 3-6  Current Treatment Recommendations: Strengthening, Transfer Training, Endurance Training, Neuromuscular Re-education, Cognitive Reorientation, Patient/Caregiver Education & Training, Equipment Evaluation, Education, & procurement, Home Exercise Program, Safety Education & Training, Functional Mobility Training, Cognitive/Perceptual Training, Stair training, Gait Training, Balance Training  Safety Devices  Type of devices:  All fall risk precautions in place     Therapy Time   Individual   Time In 1420   Time Out 1443   Minutes 23     Timed Code Treatment Minutes: 23 Minutes     Gait 15  Neuro 1635 Vladimir Beasley PTA, 03/07/18 at 3:06 PM

## 2018-03-07 NOTE — PLAN OF CARE
Problem: Falls - Risk of  Goal: Absence of falls  Outcome: Met This Shift      Problem: Pain:  Goal: Pain level will decrease  Pain level will decrease   Outcome: Ongoing    Goal: Control of acute pain  Control of acute pain   Outcome: Ongoing    Goal: Control of chronic pain  Control of chronic pain   Outcome: Ongoing      Problem: Infection:  Goal: Will remain free from infection  Will remain free from infection   Outcome: Ongoing      Problem: Safety:  Goal: Free from accidental physical injury  Free from accidental physical injury   Outcome: Met This Shift      Problem: Daily Care:  Goal: Daily care needs are met  Daily care needs are met   Outcome: Met This Shift

## 2018-03-08 LAB
ANION GAP SERPL CALCULATED.3IONS-SCNC: 22 MEQ/L (ref 7–13)
BUN BLDV-MCNC: 23 MG/DL (ref 8–23)
CALCIUM SERPL-MCNC: 8.6 MG/DL (ref 8.6–10.2)
CHLORIDE BLD-SCNC: 97 MEQ/L (ref 98–107)
CO2: 19 MEQ/L (ref 22–29)
CREAT SERPL-MCNC: 1.09 MG/DL (ref 0.5–0.9)
CREAT SERPL-MCNC: 1.13 MG/DL (ref 0.5–0.9)
GFR AFRICAN AMERICAN: 57.3
GFR AFRICAN AMERICAN: 59.7
GFR NON-AFRICAN AMERICAN: 47.3
GFR NON-AFRICAN AMERICAN: 49.3
GLUCOSE BLD-MCNC: 117 MG/DL (ref 74–109)
GLUCOSE BLD-MCNC: 138 MG/DL (ref 60–115)
GLUCOSE BLD-MCNC: 139 MG/DL (ref 60–115)
GLUCOSE BLD-MCNC: 146 MG/DL (ref 60–115)
GLUCOSE BLD-MCNC: 186 MG/DL (ref 60–115)
GLUCOSE BLD-MCNC: 225 MG/DL (ref 60–115)
GLUCOSE BLD-MCNC: 311 MG/DL (ref 60–115)
GLUCOSE BLD-MCNC: 44 MG/DL (ref 60–115)
INR BLD: 4.5
PERFORMED ON: ABNORMAL
POTASSIUM SERPL-SCNC: 3.7 MEQ/L (ref 3.5–5.1)
PROTHROMBIN TIME: 47.5 SEC (ref 8.1–13.7)
SODIUM BLD-SCNC: 138 MEQ/L (ref 132–144)

## 2018-03-08 PROCEDURE — 2580000003 HC RX 258: Performed by: INTERNAL MEDICINE

## 2018-03-08 PROCEDURE — 6360000002 HC RX W HCPCS: Performed by: INTERNAL MEDICINE

## 2018-03-08 PROCEDURE — 6370000000 HC RX 637 (ALT 250 FOR IP): Performed by: HOSPITALIST

## 2018-03-08 PROCEDURE — 6370000000 HC RX 637 (ALT 250 FOR IP): Performed by: INTERNAL MEDICINE

## 2018-03-08 PROCEDURE — 99232 SBSQ HOSP IP/OBS MODERATE 35: CPT | Performed by: INTERNAL MEDICINE

## 2018-03-08 PROCEDURE — 2580000003 HC RX 258

## 2018-03-08 PROCEDURE — 82565 ASSAY OF CREATININE: CPT

## 2018-03-08 PROCEDURE — 2060000000 HC ICU INTERMEDIATE R&B

## 2018-03-08 PROCEDURE — 80048 BASIC METABOLIC PNL TOTAL CA: CPT

## 2018-03-08 PROCEDURE — 85610 PROTHROMBIN TIME: CPT

## 2018-03-08 PROCEDURE — 36415 COLL VENOUS BLD VENIPUNCTURE: CPT

## 2018-03-08 PROCEDURE — 97116 GAIT TRAINING THERAPY: CPT

## 2018-03-08 RX ORDER — POTASSIUM CHLORIDE 20 MEQ/1
10 TABLET, EXTENDED RELEASE ORAL DAILY
Status: DISCONTINUED | OUTPATIENT
Start: 2018-03-08 | End: 2018-03-09 | Stop reason: HOSPADM

## 2018-03-08 RX ORDER — DEXTROSE MONOHYDRATE 25 G/50ML
INJECTION, SOLUTION INTRAVENOUS
Status: COMPLETED
Start: 2018-03-08 | End: 2018-03-08

## 2018-03-08 RX ORDER — POTASSIUM CHLORIDE 750 MG/1
10 TABLET, EXTENDED RELEASE ORAL DAILY
Qty: 30 TABLET | Refills: 5 | Status: SHIPPED | OUTPATIENT
Start: 2018-03-08

## 2018-03-08 RX ADMIN — PANTOPRAZOLE SODIUM 40 MG: 40 TABLET, DELAYED RELEASE ORAL at 06:14

## 2018-03-08 RX ADMIN — PROPRANOLOL HYDROCHLORIDE 60 MG: 60 CAPSULE, EXTENDED RELEASE ORAL at 20:01

## 2018-03-08 RX ADMIN — Medication 10 ML: at 20:03

## 2018-03-08 RX ADMIN — DEXTROSE MONOHYDRATE: 500 INJECTION PARENTERAL at 08:45

## 2018-03-08 RX ADMIN — ASPIRIN 81 MG: 81 TABLET, COATED ORAL at 08:43

## 2018-03-08 RX ADMIN — FUROSEMIDE 20 MG: 20 TABLET ORAL at 08:44

## 2018-03-08 RX ADMIN — INSULIN HUMAN 40 UNITS: 100 INJECTION, SUSPENSION SUBCUTANEOUS at 21:51

## 2018-03-08 RX ADMIN — POTASSIUM CHLORIDE 10 MEQ: 1.5 POWDER, FOR SOLUTION ORAL at 08:44

## 2018-03-08 RX ADMIN — NORTRIPTYLINE HYDROCHLORIDE 75 MG: 25 CAPSULE ORAL at 20:00

## 2018-03-08 RX ADMIN — ISOSORBIDE MONONITRATE 30 MG: 30 TABLET, EXTENDED RELEASE ORAL at 08:43

## 2018-03-08 RX ADMIN — MORPHINE SULFATE 2 MG: 2 INJECTION, SOLUTION INTRAMUSCULAR; INTRAVENOUS at 20:01

## 2018-03-08 RX ADMIN — DOCUSATE SODIUM 100 MG: 100 CAPSULE, LIQUID FILLED ORAL at 08:44

## 2018-03-08 RX ADMIN — PROPRANOLOL HYDROCHLORIDE 60 MG: 60 CAPSULE, EXTENDED RELEASE ORAL at 08:44

## 2018-03-08 RX ADMIN — PRAVASTATIN SODIUM 80 MG: 80 TABLET ORAL at 20:01

## 2018-03-08 RX ADMIN — OXYCODONE HYDROCHLORIDE AND ACETAMINOPHEN 1 TABLET: 5; 325 TABLET ORAL at 16:13

## 2018-03-08 RX ADMIN — DEXTROSE MONOHYDRATE 25 G: 25 INJECTION, SOLUTION INTRAVENOUS at 06:11

## 2018-03-08 RX ADMIN — POLYETHYLENE GLYCOL 3350 17 G: 17 POWDER, FOR SOLUTION ORAL at 08:44

## 2018-03-08 RX ADMIN — HYDROCHLOROTHIAZIDE 25 MG: 25 TABLET ORAL at 08:43

## 2018-03-08 RX ADMIN — OXYCODONE HYDROCHLORIDE AND ACETAMINOPHEN 1 TABLET: 5; 325 TABLET ORAL at 01:42

## 2018-03-08 RX ADMIN — LEVOTHYROXINE SODIUM 150 MCG: 150 TABLET ORAL at 06:14

## 2018-03-08 RX ADMIN — DOCUSATE SODIUM 100 MG: 100 CAPSULE, LIQUID FILLED ORAL at 20:01

## 2018-03-08 RX ADMIN — OXYCODONE HYDROCHLORIDE AND ACETAMINOPHEN 1 TABLET: 5; 325 TABLET ORAL at 22:48

## 2018-03-08 RX ADMIN — PRIMIDONE 50 MG: 50 TABLET ORAL at 20:00

## 2018-03-08 RX ADMIN — POTASSIUM CHLORIDE 10 MEQ: 1500 TABLET, EXTENDED RELEASE ORAL at 16:15

## 2018-03-08 ASSESSMENT — PAIN SCALES - GENERAL
PAINLEVEL_OUTOF10: 10
PAINLEVEL_OUTOF10: 8
PAINLEVEL_OUTOF10: 10
PAINLEVEL_OUTOF10: 0
PAINLEVEL_OUTOF10: 9
PAINLEVEL_OUTOF10: 0

## 2018-03-08 ASSESSMENT — PAIN DESCRIPTION - LOCATION
LOCATION: BACK
LOCATION: BACK

## 2018-03-08 ASSESSMENT — PAIN DESCRIPTION - FREQUENCY: FREQUENCY: CONTINUOUS

## 2018-03-08 ASSESSMENT — PAIN DESCRIPTION - PROGRESSION: CLINICAL_PROGRESSION: NOT CHANGED

## 2018-03-08 ASSESSMENT — PAIN DESCRIPTION - ORIENTATION
ORIENTATION: LOWER
ORIENTATION: LOWER

## 2018-03-08 ASSESSMENT — PAIN DESCRIPTION - DESCRIPTORS: DESCRIPTORS: ACHING

## 2018-03-08 ASSESSMENT — PAIN DESCRIPTION - ONSET: ONSET: ON-GOING

## 2018-03-08 ASSESSMENT — PAIN DESCRIPTION - PAIN TYPE
TYPE: CHRONIC PAIN
TYPE: CHRONIC PAIN

## 2018-03-08 NOTE — PLAN OF CARE
Problem: Falls - Risk of  Goal: Absence of falls  Outcome: Met This Shift      Problem: Pain:  Goal: Pain level will decrease  Pain level will decrease   Outcome: Ongoing    Goal: Control of acute pain  Control of acute pain   Outcome: Ongoing    Goal: Control of chronic pain  Control of chronic pain   Outcome: Ongoing      Problem: Infection:  Goal: Will remain free from infection  Will remain free from infection   Outcome: Met This Shift      Problem: Safety:  Goal: Free from accidental physical injury  Free from accidental physical injury   Outcome: Met This Shift      Problem: Daily Care:  Goal: Daily care needs are met  Daily care needs are met   Outcome: Met This Shift

## 2018-03-08 NOTE — PROGRESS NOTES
Wed Sat  glucose (GLUTOSE) 40 % oral gel 15 g, 15 g, Oral, PRN  dextrose 50 % solution 12.5 g, 12.5 g, Intravenous, PRN  glucagon (rDNA) injection 1 mg, 1 mg, Intramuscular, PRN  dextrose 5 % solution, 100 mL/hr, Intravenous, PRN  sodium chloride flush 0.9 % injection 10 mL, 10 mL, Intravenous, 2 times per day  sodium chloride flush 0.9 % injection 10 mL, 10 mL, Intravenous, PRN  acetaminophen (TYLENOL) tablet 650 mg, 650 mg, Oral, Q4H PRN  traMADol (ULTRAM) tablet 100 mg, 100 mg, Oral, Q6H PRN  docusate sodium (COLACE) capsule 100 mg, 100 mg, Oral, BID  ondansetron (ZOFRAN) injection 4 mg, 4 mg, Intravenous, Q6H PRN  morphine injection 2 mg, 2 mg, Intravenous, Q2H PRN    PHYSICAL EXAM:    VITALS:  BP (!) 140/73   Pulse 72   Temp 97.5 °F (36.4 °C) (Oral)   Resp 18   Ht 5' 8\" (1.727 m)   Wt 231 lb 0.7 oz (104.8 kg)   SpO2 98%   BMI 35.13 kg/m²   INTAKE/OUTPUT:    Intake/Output Summary (Last 24 hours) at 03/08/18 0080  Last data filed at 03/08/18 6291   Gross per 24 hour   Intake              480 ml   Output                0 ml   Net              480 ml     CONSTITUTIONAL:  awake, alert, cooperative, no apparent distress, and appears stated age  EYES: pin conjunctivae; anicteric sclerae  ENT:  Normocephalic, no epistaxis  NECK:  Supple, symmetrical, trachea midline, no adenopathy  LUNGS:  No increased work of breathing, good air exchange, clear to auscultation bilaterally, no crackles or wheezing  CARDIOVASCULAR:  Normal apical impulse, regular rate and rhythm, normal heart sounds  ABDOMEN: soft, non-distended, non-tender,no masses palpated,no hepatosplenomegaly  MUSCULOSKELETAL:  No tenderness over spine or ribs  EXTREMITIES:There is no pedal edema; no calf tenderness  NEUROLOGIC:  Awake, alert, oriented to name, place and time.   No facial asymmetry; moving all extremities well  SKIN:  no bruising or bleeding and no rashes  PSYCH: cooperative; appropriate affect and behavior    DATA:      PT/INR:  No results MIP and 3D volume rendered reconstructions were performed. FINDINGS: There are no findings of central or proximal pulmonary emboli. There is diffuse scattered multiple nodular densities throughout the left and right lung parenchyma too numerous to count. There does appear to be increase in both size and number as compared to prior study. Again note is made of surgical staples in the left hilum and patient having undergone prior partial pneumonectomy. There is increasing areas of soft tissue densities within the left left lower lobe. . There is a persistent loculated left-sided pleural effusion. There is subcentimeter periaortic pretracheal and right perihilar adenopathy. In the field-of-view again note is made of a cystic area at the mid to lower pole of left kidney. Unchanged. Probable renal cyst. Osseous structures again show probable postsurgical change in the left seventh rib. 1.  NO EVIDENCE OF CENTRAL OR SEGMENTAL PULMONARY EMBOLISM. 2.  THERE ARE MULTIPLE SCATTERED BILATERAL PULMONARY NODULES WITHIN THE LUNG PARENCHYMA TOO NUMEROUS TO COUNT. FINDINGS ARE THAT OF METASTATIC DISEASE. THERE DOES APPEAR TO BE BOTH INCREASE IN NUMBER AND SIZE SUGGESTING INTERVAL PROGRESSION OF UNDERLYING  DISEASE. 3.  3. INCREASING SOFT TISSUE DENSITIES WITHIN THE LEFT LOWER LOBE. FINDINGS COULD REPRESENT COMBINATION AREAS OF CONSOLIDATION  BUT ASSOCIATED INFILTRATIVE WORSENING MALIGNANCY IS NOT EXCLUDED. 4.  OTHER FINDINGS DETAILED ABOVE All CT scans at this facility use dose modulation, iterative reconstruction, and/or weight based dosing when appropriate to reduce radiation dose to as low as reasonably achievable. Mri Thoracic Spine W Wo Contrast    Result Date: 3/5/2018  THORACIC AND LUMBAR SPINE MRI WITHOUT AND WITH CONTRAST: 3/4/2018 CLINICAL HISTORY:  COORDINATION CHANGES, NEW OR PROGRESSIVE . COMPARISON: Chest CTA 3/2/2018, and CT chest, abdomen and pelvis 11/7/2017.  TECHNIQUE: Multiplanar MR imaging of the thoracic and lumbar spine was performed before and after intravenous administration of approximately 20 mL of ProHance gadolinium contrast. THORACIC MRI FINDINGS: No evidence of skeletal metastatic disease is identified with thoracic spine. There is no abnormal enhancement, central spinal stenosis, neural foraminal narrowing, or significant disc herniations. A 1.3 cm vertebral hemangioma is noted within the anterior aspect of the T3 vertebral body, with very small vertebral hemangiomas noted elsewhere. LUMBAR MRI FINDINGS: A 1.5 cm around metastatic lesion is noted within the posterolateral aspect of the L3 vertebral body on the left, without significant retropulsion of bone, spinal stenosis or pathologic fracture. No other skeletal metastatic lesions are identified elsewhere within the lumbar spine. The [id number] to severe facet joints are unremarkable. At the L4-5 level, there is mild to moderate diffuse disc bulging, and moderate hypertrophic facet and ligamentum flavum changes, which results in borderline neural foraminal narrowing. There is no significant spinal stenosis or disc herniation. At the L5-S1 level, there is moderate disc space narrowing, mild left posterolateral endplate osteophytosis and mild hypertrophic facet and ligamentum flavum changes, without central spinal stenosis, neural foraminal narrowing, or significant disc herniation. FINAL IMPRESSION: 1.5 CM METASTATIC POSTEROLATERAL ASPECT OF THE L3 VERTEBRAL BODY ON THE LEFT, WITHOUT COMPLICATION. NO OTHER SKELETAL METASTATIC DISEASE IDENTIFIED ELSEWHERE WITHIN THE THORACIC AND LUMBAR SPINE. DEGENERATIVE CHANGES, PREDOMINANTLY OF THE L4-5 AND L5-S1 LEVELS. Mri Lumbar Spine W Wo Contrast    Result Date: 3/5/2018  THORACIC AND LUMBAR SPINE MRI WITHOUT AND WITH CONTRAST: 3/4/2018 CLINICAL HISTORY:  COORDINATION CHANGES, NEW OR PROGRESSIVE . COMPARISON: Chest CTA 3/2/2018, and CT chest, abdomen and pelvis 11/7/2017.  TECHNIQUE: Multiplanar MR imaging discussion about possible side-effects The pt remains undecided about chemotx. The pt is OK for discharge  from oncology standpoint. The pt will follow-up in our office in 3 weeks. 2. Intermittent bilateral leg weakness due to peripheral neuropathy from her diabetes as well as previous chemotx. No spinal cord compression on MRI of spine. Pt is being planned for physical therapy thru [facility].       Electronically signed by Heidy Morillo MD on 3/8/18 at 9:38 AM

## 2018-03-08 NOTE — PROGRESS NOTES
Endocrinology consulted. Per Dr. Nikita Medley, Uncontrolled diabetes worsened with pain, lung cancer, and diabetic neuropathy. Increase Humulin and to 90 units twice a day and Humalog 20 with each meals. Continue other supportive care patient can be discharged from endocrine service     Essential hypertension   Acquired hypothyroidism  CAD    Dispo: May discharge home with [facility] once cleared by consultants. Advised F/U with PCP 1 - 2 days of discharge      SUBJECTIVE  CHIEF COMPLAINT: chest pain    PRIMARY SERVICE: Medicine    INTERVAL HPI: Pt says she better today. No acute concerns or complaints.       MEDICATIONS:    Current Facility-Administered Medications   Medication Dose Route Frequency Provider Last Rate Last Dose    insulin NPH (HUMULIN N;NOVOLIN N) injection vial 75 Units  75 Units Subcutaneous BID AC Malvin Suarez MD        polyethylene glycol (GLYCOLAX) packet 17 g  17 g Oral Daily Rodolfo Schafer MD   17 g at 03/08/18 0844    sodium chloride flush 0.9 % injection 10 mL  10 mL Intravenous PRN Mayuri Erwin MD   20 mL at 03/05/18 0805    insulin lispro (HUMALOG) injection vial 20 Units  20 Units Subcutaneous TID  Karime White MD   20 Units at 03/08/18 1159    aspirin EC tablet 81 mg  81 mg Oral Daily Alcides Pfeiffer MD   81 mg at 03/08/18 0843    nortriptyline (PAMELOR) capsule 75 mg  75 mg Oral Nightly Viki Pearson MD   75 mg at 03/07/18 2131    isosorbide mononitrate (IMDUR) extended release tablet 30 mg  30 mg Oral Daily Viki Pearson MD   30 mg at 03/08/18 0843    oxyCODONE-acetaminophen (PERCOCET) 5-325 MG per tablet 1 tablet  1 tablet Oral Q4H PRN Mayuri Erwin MD   1 tablet at 03/08/18 0142    insulin lispro (HUMALOG) injection vial 0-12 Units  0-12 Units Subcutaneous TID  Malvin Suarez MD   8 Units at 03/08/18 1158    insulin lispro (HUMALOG) injection vial 0-6 Units  0-6 Units Subcutaneous Nightly Malvin Suarez MD   6 Units at 03/06/18 0036    nitroGLYCERIN (NITROSTAT) SL tablet 0.4 mg  0.4 mg Sublingual Q5 Min PRN Marysol Postal, CNP   0.4 mg at 03/02/18 1247    albuterol sulfate  (90 Base) MCG/ACT inhaler 2 puff  2 puff Inhalation Q4H PRN Azam Youssef MD        furosemide (LASIX) tablet 20 mg  20 mg Oral Daily Azam Youssef MD   20 mg at 03/08/18 0844    hydrochlorothiazide (HYDRODIURIL) tablet 25 mg  25 mg Oral Daily Azam Youssef MD   25 mg at 03/08/18 0843    hydrOXYzine (ATARAX) tablet 25 mg  25 mg Oral Q8H PRN Pia Gonzalez MD   25 mg at 03/04/18 1804    levothyroxine (SYNTHROID) tablet 150 mcg  150 mcg Oral Daily Pia Gonzalez MD   150 mcg at 03/08/18 0614    pantoprazole (PROTONIX) tablet 40 mg  40 mg Oral QAM AC Azam Youssef MD   40 mg at 03/08/18 9313    potassium chloride (KLOR-CON) packet 10 mEq  10 mEq Oral Daily Azam Youssef MD   10 mEq at 03/08/18 0844    pravastatin (PRAVACHOL) tablet 80 mg  80 mg Oral Nightly Pia Gonzalez MD   80 mg at 03/07/18 2132    primidone (MYSOLINE) tablet 50 mg  50 mg Oral Nightly Pia Gonzalez MD   50 mg at 03/07/18 2132    propranolol (INDERAL LA) extended release capsule 60 mg  60 mg Oral BID Azam Youssef MD   60 mg at 03/08/18 0844    warfarin (COUMADIN) tablet 2.5 mg  2.5 mg Oral Once per day on Sun Mon Tue Thu Fri Pia Gonzalez MD   2.5 mg at 03/06/18 1854    warfarin (COUMADIN) tablet 5 mg  5 mg Oral Once per day on Wed Sat Pia Gonzalez MD   5 mg at 03/07/18 1658    glucose (GLUTOSE) 40 % oral gel 15 g  15 g Oral PRN Azam Youssef MD        dextrose 50 % solution 12.5 g  12.5 g Intravenous PRN Azam Youssef MD   25 g at 03/08/18 6614    glucagon (rDNA) injection 1 mg  1 mg Intramuscular PRN Azam Youssef MD        dextrose 5 % solution  100 mL/hr Intravenous PRN Azam Youssef MD        sodium chloride flush 0.9 % injection 10 mL  10 mL Intravenous 2 times per day Azam Youssef MD   10 mL at 03/07/18 2134    sodium chloride flush 0.9 % injection 10 mL  10 mL Intravenous PRN Teofilo Wong MD        acetaminophen (TYLENOL) tablet 650 mg  650 mg Oral Q4H PRN Teofilo Wong MD        traMADol Ale Meghan) tablet 100 mg  100 mg Oral Q6H PRN Teofilo Wong MD   100 mg at 03/03/18 0948    docusate sodium (COLACE) capsule 100 mg  100 mg Oral BID Teofilo Wong MD   100 mg at 03/08/18 0844    ondansetron (ZOFRAN) injection 4 mg  4 mg Intravenous Q6H PRN Teofilo Wong MD        morphine injection 2 mg  2 mg Intravenous Q2H PRN Teofilo Wong MD           OBJECTIVE  PHYSICAL EXAM:   BP (!) 140/73   Pulse 72   Temp 97.5 °F (36.4 °C) (Oral)   Resp 18   Ht 5' 8\" (1.727 m)   Wt 231 lb 0.7 oz (104.8 kg)   SpO2 98%   BMI 35.13 kg/m²   Body mass index is 35.13 kg/m². CONSTITUTIONAL:  awake, alert, cooperative, no apparent distress, and appears stated age  EYES:  Lids and lashes normal, pupils equal, round and reactive to light, extra ocular muscles intact, sclera clear, conjunctiva normal  LUNGS:  No increased work of breathing, good air exchange, clear to auscultation bilaterally, no crackles or wheezing  CARDIOVASCULAR:  Normal apical impulse, regular rate and rhythm, normal S1 and S2, no S3 or S4, and no murmur noted  ABDOMEN:  No scars, normal bowel sounds, soft, non-distended, non-tender, no masses palpated, no hepatosplenomegally  EXT: No c/c/e    DATA:   Diagnostic tests reviewed for today's visit:    Most recent labs and imaging results reviewed.      SIGNATURE: Annette Souza MD PATIENT NAME: Aarti Nielson   DATE: March 8, 2018 MRN: [id number]   TIME: 12:39 PM PAGER: [phone]

## 2018-03-08 NOTE — PROGRESS NOTES
Endocrinology Progress Note    Assessment and Plan:   Assessment-  1. Type 2 insulin-dependent diabetes  2. Hyperglycemia  3. Metastatic breast cancer  4. Morning hypoglycemia        Plan-  1. Decrease Nvaqxae59 units a.m., Humulin 40 units nightly, Humalog 20 units 3 times a day with meals  2. Monitor glycemic control closely  3. We'll make further insulin dosing adjustments as needed  4. Hold discharge until tomorrow for glycemic monitoring and control    POC Glucose:   Recent Labs      03/07/18   2044  03/08/18   0605  03/08/18   0627  03/08/18   0711  03/08/18   1104   POCGLU  120*  44*  146*  139*  311*     HGBA1C:  No results for input(s): LABA1C in the last 72 hours. CBC:   No results for input(s): WBC, HGB, PLT in the last 72 hours. CMP:    Recent Labs      03/07/18   1538  03/08/18   0627  03/08/18   1130   NA   --   138   --    K   --   3.7   --    CL   --   97*   --    CO2   --   19*   --    BUN   --   23   --    CREATININE  1.11*  1.09*  1.13*   GLUCOSE   --   117*   --    CALCIUM   --   8.6   --    LABGLOM  48.3*  49.3*  47.3*         CC:   Chief Complaint   Patient presents with    Chest Pain     started 5 days ago. Pain left chest radiates to left neck. Pain is intermittent  and stabbing. Pt traveled to [place] on 2/19 and returned on 2/28 by plane       Subjective: Interval History: Patients 80-year-old type 2 insulin-dependent female with a history of breast cancer with metastatic disease to the spine. She is admitted for left sided sharp chest pain that is resolved. She was found to be hyperglycemic with an A1c of 12.9.   We have adjusted her insulin dosing regimen with improved glycemic control    Review of systems: denies polyuria, polydipsia, ABD pain, flank pain, N/V/D, or diaphoresis  Medications:   Scheduled Meds:   potassium chloride  10 mEq Oral Daily    insulin lispro  15 Units Subcutaneous TID WC    [START ON 3/9/2018] insulin NPH  60 Units Subcutaneous QAM    insulin NPH

## 2018-03-08 NOTE — PROGRESS NOTES
Neurology Follow up    SUBJECTIVE:  NO Headache, double vision,blurry vision,difficulty with speech,difficulty with swallowing,weakness,numbness,pain,nausea,vomitting,chocking,neck pain,dizziness,    PHYSICAL EXAM:    /67   Pulse 78   Temp 97.5 °F (36.4 °C) (Oral)   Resp 18   Ht 5' 8\" (1.727 m)   Wt 234 lb 12.6 oz (106.5 kg)   SpO2 100%   BMI 35.70 kg/m²   General Appearance:      Mental Status Exam:             Level of Alertness:   awake            Orientation:   person, place, time            Memory:   normal            Fund of Knowledge:  normal            Attention/Concentration:  normal            Language:  normal      Funduscopic Exam:     Cranial Nerves        Cranial nerve II           Visual acuity:  normal           Visual fields:  normal      Cranial nerve III           Pupils:  equal, round, reactive to light      Cranial nerves III, IV, VI           Extraocular Movements: intact      Cranial nerve V           Facial sensation:  intact      Cranial nerve VII           Facial strength: intact      Cranial nerve VIII           Hearing:  intact      Cranial nerve IX           Palate:  intact      Cranial nerve XI         Shoulder shrug:  intact      Cranial nerve XII          Tongue movement:  normal    Motor:    Drift:  absent  Motor exam is symmetrical 5 out of 5 all extremities bilaterally  Tone:  normal  Abnormal Movements:  absent            Sensory:        Pinprick             Right Upper Extremity:  normal             Left Upper Extremity:  normal             Right Lower Extremity:  normal             Left Lower Extremity:  normal           Vibration                         Touch            Proprioception                 Coordination:           Finger/Nose   Right:  normal              Left:  normal          Heel-Knee-Shin                Right:  normal              Left:  normal          Rapid Alternating Movements              Right:  normal              Left:  normal          Gait:

## 2018-03-08 NOTE — PROGRESS NOTES
[facility] OF THE [facility] Progress Note      Patient: Yajaira Ye  Unit/Bed: Y450/T832-79  YOB: 1945  MRN: [id number]  Admit Date:  3/2/2018  Hospital Day: 5    Subjective Complaint:   Looks stable from a cardiac standpoint but has pain most likely from her lung malignancy . Physical Examination:     BP (!) 140/73   Pulse 72   Temp 97.5 °F (36.4 °C) (Oral)   Resp 18   Ht 5' 8\" (1.727 m)   Wt 231 lb 0.7 oz (104.8 kg)   SpO2 98%   BMI 35.13 kg/m²       Intake/Output Summary (Last 24 hours) at 03/08/18 1237  Last data filed at 03/08/18 9280   Gross per 24 hour   Intake              480 ml   Output                0 ml   Net              480 ml                  Yajaira Ye examined at bedside in moderately ill. Focused exam reveals:     Cardiac: Heart regular rate and rhythm     Lungs:  clear to auscultation bilaterally- no wheezes, rales or rhonchi, normal air movement, no respiratory distress    Extremities:   1+ edema    Telemetry:      normal sinus          LABS:   CBC: No results for input(s): WBC, HGB, PLT in the last 72 hours. BMP:  Recent Labs      03/07/18   1538  03/08/18   0627  03/08/18   1130   NA   --   138   --    K   --   3.7   --    CL   --   97*   --    CO2   --   19*   --    BUN   --   23   --    CREATININE  1.11*  1.09*  1.13*   GLUCOSE   --   117*   --               Troponin: No results for input(s): TROPONINT in the last 72 hours. BNP: No results for input(s): PROBNP in the last 72 hours. INR:   Recent Labs      03/06/18   0610  03/07/18   0557  03/08/18   0627   INR  2.9  3.3  4.5      Mg: No results for input(s): MG in the last 72 hours. Cardiac Testing:    none    Assessment:    Appears that pt has metastatic lung disease--relatively poor prognosis  No cardiac w/u pending  Had DVT 12/2016 and coumadin per Dr Christine Tidwell had been treated for one year but still is high risk for another one  Plan:  1. OK to d/c  2.  Will let coumadin vs eliquis vs no therapy up to dr Marimar Davenport as he will see pt for malignancy and per patient he follows the inr as outpatient  Electronically signed by Denver Delgado MD on 3/8/2018 at 12:37 PM

## 2018-03-08 NOTE — PROGRESS NOTES
devices: Call light within reach, Left in chair     Therapy Time   Individual   Time In 1020   Time Out 1035   Minutes 15   Pt agreeable to ambulate with staff and get up to chair. Pts granddaughter present and said her mom is coming to stay and bathe pt. Pt c/o of her BS being low every day. 10 minutes gt  5 minutes bed mob.           [facility] SEBASTIÁN GILLETTE PTA, 03/08/18 at 10:41 AM

## 2018-03-09 VITALS
OXYGEN SATURATION: 95 % | DIASTOLIC BLOOD PRESSURE: 55 MMHG | HEIGHT: 68 IN | TEMPERATURE: 97.7 F | SYSTOLIC BLOOD PRESSURE: 116 MMHG | BODY MASS INDEX: 35.02 KG/M2 | WEIGHT: 231.04 LBS | HEART RATE: 77 BPM | RESPIRATION RATE: 18 BRPM

## 2018-03-09 LAB
GLUCOSE BLD-MCNC: 273 MG/DL (ref 60–115)
GLUCOSE BLD-MCNC: 283 MG/DL (ref 60–115)
GLUCOSE BLD-MCNC: 291 MG/DL (ref 60–115)
GLUCOSE BLD-MCNC: 334 MG/DL (ref 60–115)
INR BLD: 3.8
PERFORMED ON: ABNORMAL
PROTHROMBIN TIME: 40.3 SEC (ref 8.1–13.7)

## 2018-03-09 PROCEDURE — 6370000000 HC RX 637 (ALT 250 FOR IP): Performed by: INTERNAL MEDICINE

## 2018-03-09 PROCEDURE — 97116 GAIT TRAINING THERAPY: CPT

## 2018-03-09 PROCEDURE — 36415 COLL VENOUS BLD VENIPUNCTURE: CPT

## 2018-03-09 PROCEDURE — 2580000003 HC RX 258: Performed by: INTERNAL MEDICINE

## 2018-03-09 PROCEDURE — 99231 SBSQ HOSP IP/OBS SF/LOW 25: CPT | Performed by: PHYSICIAN ASSISTANT

## 2018-03-09 PROCEDURE — 85610 PROTHROMBIN TIME: CPT

## 2018-03-09 PROCEDURE — 6370000000 HC RX 637 (ALT 250 FOR IP): Performed by: HOSPITALIST

## 2018-03-09 RX ADMIN — OXYCODONE HYDROCHLORIDE AND ACETAMINOPHEN 1 TABLET: 5; 325 TABLET ORAL at 14:03

## 2018-03-09 RX ADMIN — ISOSORBIDE MONONITRATE 30 MG: 30 TABLET, EXTENDED RELEASE ORAL at 08:27

## 2018-03-09 RX ADMIN — INSULIN HUMAN 60 UNITS: 100 INJECTION, SUSPENSION SUBCUTANEOUS at 08:38

## 2018-03-09 RX ADMIN — POLYETHYLENE GLYCOL 3350 17 G: 17 POWDER, FOR SOLUTION ORAL at 08:26

## 2018-03-09 RX ADMIN — PROPRANOLOL HYDROCHLORIDE 60 MG: 60 CAPSULE, EXTENDED RELEASE ORAL at 08:27

## 2018-03-09 RX ADMIN — Medication 10 ML: at 08:27

## 2018-03-09 RX ADMIN — ASPIRIN 81 MG: 81 TABLET, COATED ORAL at 08:25

## 2018-03-09 RX ADMIN — POTASSIUM CHLORIDE 10 MEQ: 1500 TABLET, EXTENDED RELEASE ORAL at 08:26

## 2018-03-09 RX ADMIN — LEVOTHYROXINE SODIUM 150 MCG: 150 TABLET ORAL at 05:52

## 2018-03-09 RX ADMIN — DOCUSATE SODIUM 100 MG: 100 CAPSULE, LIQUID FILLED ORAL at 08:26

## 2018-03-09 RX ADMIN — HYDROCHLOROTHIAZIDE 25 MG: 25 TABLET ORAL at 08:27

## 2018-03-09 RX ADMIN — PANTOPRAZOLE SODIUM 40 MG: 40 TABLET, DELAYED RELEASE ORAL at 05:52

## 2018-03-09 ASSESSMENT — PAIN SCALES - GENERAL: PAINLEVEL_OUTOF10: 8

## 2018-03-09 NOTE — DISCHARGE INSTR - OTHER ORDERS
No coumadin today or sat   On sun take 2.5   Then go to Dr Samia Turpin s office on Monday for INR   And dosing of coumadin    Then INR weekly until office  visit

## 2018-03-09 NOTE — PROGRESS NOTES
chloride flush 0.9 % injection 10 mL  10 mL Intravenous 2 times per day Iqra Lay MD   10 mL at 03/09/18 0827    sodium chloride flush 0.9 % injection 10 mL  10 mL Intravenous PRN Iqra Lay MD        acetaminophen (TYLENOL) tablet 650 mg  650 mg Oral Q4H PRN Iqra Lay MD        traMADol Collie Rosas) tablet 100 mg  100 mg Oral Q6H PRN Iqra Lay MD   100 mg at 03/03/18 0948    docusate sodium (COLACE) capsule 100 mg  100 mg Oral BID qIra Lay MD   100 mg at 03/09/18 0826    ondansetron (ZOFRAN) injection 4 mg  4 mg Intravenous Q6H PRN Iqra Lay MD        morphine injection 2 mg  2 mg Intravenous Q2H PRN Iqra Lay MD   2 mg at 03/08/18 2001       OBJECTIVE  PHYSICAL EXAM:   BP (!) 116/55   Pulse 77   Temp 97.7 °F (36.5 °C) (Oral)   Resp 18   Ht 5' 8\" (1.727 m)   Wt 231 lb 0.7 oz (104.8 kg)   SpO2 95%   BMI 35.13 kg/m²   Body mass index is 35.13 kg/m². CONSTITUTIONAL:  awake, alert, cooperative, no apparent distress, and appears stated age  EYES:  Lids and lashes normal, pupils equal, round and reactive to light, extra ocular muscles intact, sclera clear, conjunctiva normal  LUNGS:  No increased work of breathing, good air exchange, clear to auscultation bilaterally, no crackles or wheezing  CARDIOVASCULAR:  Normal apical impulse, regular rate and rhythm, normal S1 and S2, no S3 or S4, and no murmur noted  ABDOMEN:  No scars, normal bowel sounds, soft, non-distended, non-tender, no masses palpated, no hepatosplenomegally  EXT: No c/c/e    DATA:   Diagnostic tests reviewed for today's visit:    Most recent labs and imaging results reviewed.      SIGNATURE: Sridhar Kam MD PATIENT NAME: Hailey Marin   DATE: March 9, 2018 MRN: [id number]   TIME: 1:26 PM PAGER: [phone]

## 2018-03-09 NOTE — PROGRESS NOTES
Reflexes:             Deep Tendon Reflexes:             Reflexes are absent in lower extremity             Plantar response:                Right:  downgoing               Left:  downgoing    Vascular:  Cardiac Exam:  normal         Cta Chest W Wo Contrast    Result Date: 3/2/2018  The EXAMINATION: CT scan of the chest with contrast (pulmonary embolism protocol) INDICATION:  shortness of breath. COMPARISON: November 7, 6684 TECHNIQUE: Helical CT was performed through the chest utilizing 100 cc of Isovue-370 intravenous contrast.  Images were obtained with bolus tracking in order to opacify the pulmonary arteries. There is no comparison available. Both MIP and 3D volume rendered reconstructions were performed. FINDINGS: There are no findings of central or proximal pulmonary emboli. There is diffuse scattered multiple nodular densities throughout the left and right lung parenchyma too numerous to count. There does appear to be increase in both size and number as compared to prior study. Again note is made of surgical staples in the left hilum and patient having undergone prior partial pneumonectomy. There is increasing areas of soft tissue densities within the left left lower lobe. . There is a persistent loculated left-sided pleural effusion. There is subcentimeter periaortic pretracheal and right perihilar adenopathy. In the field-of-view again note is made of a cystic area at the mid to lower pole of left kidney. Unchanged. Probable renal cyst. Osseous structures again show probable postsurgical change in the left seventh rib. 1.  NO EVIDENCE OF CENTRAL OR SEGMENTAL PULMONARY EMBOLISM. 2.  THERE ARE MULTIPLE SCATTERED BILATERAL PULMONARY NODULES WITHIN THE LUNG PARENCHYMA TOO NUMEROUS TO COUNT. FINDINGS ARE THAT OF METASTATIC DISEASE. THERE DOES APPEAR TO BE BOTH INCREASE IN NUMBER AND SIZE SUGGESTING INTERVAL PROGRESSION OF UNDERLYING  DISEASE.  3.  3. INCREASING SOFT TISSUE DENSITIES WITHIN THE LEFT LOWER LOBE. FINDINGS COULD REPRESENT COMBINATION AREAS OF CONSOLIDATION  BUT ASSOCIATED INFILTRATIVE WORSENING MALIGNANCY IS NOT EXCLUDED. 4.  OTHER FINDINGS DETAILED ABOVE All CT scans at this facility use dose modulation, iterative reconstruction, and/or weight based dosing when appropriate to reduce radiation dose to as low as reasonably achievable. No results for input(s): WBC, HGB, PLT in the last 72 hours. Recent Labs      03/07/18   1538   CREATININE  1.11*     No results for input(s): BILITOT, ALKPHOS, AST, ALT in the last 72 hours.   Lab Results   Component Value Date    PROTIME 35.2 03/07/2018    INR 3.3 03/07/2018     No results found for: LITHIUM, DILFRTOT, VALPROATE    ASSESSMENT AND PLAN    PN/myalgia  Diabetic neuropathy with chemotheraphty  Gait ataxia, secondary to PN  Needs aggerssive therapy  No new changes  Ambulated some  PT eavl notes  Not progressive

## 2018-03-20 ENCOUNTER — OFFICE VISIT (OUTPATIENT)
Dept: FAMILY MEDICINE CLINIC | Age: 73
End: 2018-03-20
Payer: MEDICARE

## 2018-03-20 VITALS
TEMPERATURE: 97.8 F | DIASTOLIC BLOOD PRESSURE: 82 MMHG | HEART RATE: 78 BPM | HEIGHT: 66 IN | SYSTOLIC BLOOD PRESSURE: 132 MMHG | BODY MASS INDEX: 38.89 KG/M2 | OXYGEN SATURATION: 97 % | RESPIRATION RATE: 26 BRPM | WEIGHT: 242 LBS

## 2018-03-20 DIAGNOSIS — T50.905A MEDICATION REACTION, INITIAL ENCOUNTER: Primary | ICD-10-CM

## 2018-03-20 DIAGNOSIS — L23.9 ALLERGIC DERMATITIS: ICD-10-CM

## 2018-03-20 PROCEDURE — 99213 OFFICE O/P EST LOW 20 MIN: CPT | Performed by: NURSE PRACTITIONER

## 2018-03-20 RX ORDER — HYDROCODONE BITARTRATE AND ACETAMINOPHEN 5; 325 MG/1; MG/1
TABLET ORAL
Refills: 0 | COMMUNITY
Start: 2018-03-12

## 2018-03-20 ASSESSMENT — ENCOUNTER SYMPTOMS
NAIL CHANGES: 0
DIARRHEA: 0
COUGH: 0
SORE THROAT: 0
RHINORRHEA: 0
SHORTNESS OF BREATH: 0
VOMITING: 0
EYE PAIN: 0

## 2018-03-20 NOTE — PROGRESS NOTES
Subjective  Juancarlos Mejia, 68 y.o. female presents today with:  Chief Complaint   Patient presents with    Rash     x3 days ago the Rash is itchy        Rash   This is a new problem. The problem has been gradually worsening since onset. The rash is diffuse. The rash is characterized by itchiness and redness. She was exposed to a new medication (recently started on norco for pain). Pertinent negatives include no anorexia, congestion, cough, diarrhea, eye pain, facial edema, fatigue, fever, joint pain, nail changes, rhinorrhea, shortness of breath, sore throat or vomiting. Treatments tried: atarax, hydrocortisone OTC, pepcid 20mg BID. The treatment provided no relief. Review of Systems   Constitutional: Negative for activity change, appetite change, chills, diaphoresis, fatigue and fever. HENT: Negative for congestion, rhinorrhea and sore throat. Eyes: Negative for pain. Respiratory: Negative for cough and shortness of breath. Gastrointestinal: Negative for anorexia, diarrhea and vomiting. Musculoskeletal: Negative for joint pain. Skin: Positive for rash. Negative for nail changes.        Past Medical History:   Diagnosis Date    Atherosclerosis of native coronary artery     Benign familial tremor     CAD (coronary artery disease)     Cancer (HCC)     COPD (chronic obstructive pulmonary disease) (HCC)     De Quervain's tenosynovitis, right     Diabetes mellitus ([address]     Diabetic neuropathy ([address]     DVT (deep venous thrombosis) ([address] 10/2016    left leg     Hypertension     [facility]     Metastatic lung cancer (metastasis from lung to other site) [facility])     bilat    Nuclear sclerosis of left eye     Osteoporosis     Peptic ulcer     Pseudophakia     Thyroid disease      Past Surgical History:   Procedure Laterality Date    BREAST SURGERY Left     biopsy    CORONARY ANGIOPLASTY WITH STENT PLACEMENT      EYE SURGERY      HYSTERECTOMY      LUNG REMOVAL, PARTIAL left 2014     Social History     Social History    Marital status:      Spouse name: N/A    Number of children: N/A    Years of education: N/A     Occupational History    Not on file. Social History Main Topics    Smoking status: Former Smoker    Smokeless tobacco: Never Used    Alcohol use No    Drug use: No    Sexual activity: Not on file     Other Topics Concern    Not on file     Social History Narrative    No narrative on file     History reviewed. No pertinent family history.   Allergies   Allergen Reactions    Penicillins      Current Outpatient Prescriptions   Medication Sig Dispense Refill    HYDROcodone-acetaminophen (NORCO) 5-325 MG per tablet TK 1 T PO Q 6 H PRN P  0    insulin NPH (NOVOLIN N) 100 UNIT/ML injection vial Inject 60 Units into the skin 2 times daily (before meals) 1 vial 3    insulin regular (HUMULIN R) 100 UNIT/ML injection Inject 20 Units into the skin 3 times daily (before meals) 30 units at each meals 1 vial 3    potassium chloride (KLOR-CON M) 10 MEQ extended release tablet Take 1 tablet by mouth daily 30 tablet 5    omeprazole (PRILOSEC) 40 MG delayed release capsule Take 40 mg by mouth daily      hydrOXYzine (ATARAX) 25 MG tablet Take 25 mg by mouth every 8 hours as needed for Itching      nortriptyline (PAMELOR) 25 MG capsule Take 25 mg by mouth nightly      diclofenac sodium 1 % GEL Apply 2 g topically 4 times daily      metFORMIN (GLUCOPHAGE-XR) 500 MG extended release tablet Take 1,000 mg by mouth 2 times daily      hydrochlorothiazide (HYDRODIURIL) 25 MG tablet Take 25 mg by mouth daily      levETIRAcetam (KEPPRA) 250 MG tablet Take 1 tablet by mouth 2 times daily 60 tablet 0    linaclotide (LINZESS) 145 MCG capsule Take 1 capsule by mouth every morning (before breakfast) 30 capsule 0    famotidine (PEPCID) 20 MG tablet Take 1 tablet by mouth 2 times daily 30 tablet 0    insulin NPH (NOVOLIN N) 100 UNIT/ML injection vial Inject 80 Units into the skin 2 times daily (before meals) (Patient taking differently: Inject 90 Units into the skin 2 times daily (before meals) ) 6 vial 3    Insulin Syringe-Needle U-100 ([employer] INSULIN SYR 1CC/30G) 30G X 5/16\" 1 ML MISC 1 each by Does not apply route daily 100 each 3    aspirin EC 81 MG EC tablet Take 1 tablet by mouth daily 30 tablet 3    warfarin (COUMADIN) 2.5 MG tablet Take 2.5 mg (one tab) today and 5mg (2 tabs on Thursday) and then pt is to f/u with Dr. Mary Ann Peres for further orders (Patient taking differently: Take 2.5 mg by mouth daily Indications: 5mg on wed and sat, 2.5mg on all other days Take 2.5 mg (one tab) today and 5mg (2 tabs on Thursday) and then pt is to f/u with Dr. Mary Ann Peres for further orders) 60 tablet 5    doxepin (SINEQUAN) 100 MG capsule Take 200 mg by mouth nightly      isosorbide mononitrate (IMDUR) 30 MG extended release tablet Take 30 mg by mouth daily      levothyroxine (SYNTHROID) 150 MCG tablet Take 150 mcg by mouth Daily      nitroGLYCERIN (NITROSTAT) 0.4 MG SL tablet Place 0.4 mg under the tongue every 5 minutes as needed for Chest pain      pravastatin (PRAVACHOL) 80 MG tablet Take 80 mg by mouth daily      primidone (MYSOLINE) 50 MG tablet Take 50 mg by mouth nightly      albuterol sulfate  (90 BASE) MCG/ACT inhaler Inhale 2 puffs into the lungs every 4 hours as needed for Wheezing       propranolol (INDERAL LA) 60 MG extended release capsule Take 60 mg by mouth 2 times daily        No current facility-administered medications for this visit. PMH, Surgical Hx, Family Hx, and Social Hx reviewed and updated. Health Maintenance reviewed. Objective    Vitals:    03/20/18 1928   BP: 132/82   Pulse: 78   Resp: 26   Temp: 97.8 °F (36.6 °C)   TempSrc: Tympanic   SpO2: 97%   Weight: 242 lb (109.8 kg)   Height: 5' 6\" (1.676 m)       Physical Exam   Constitutional: She is oriented to person, place, and time. She appears well-developed and well-nourished.  She appears distressed (scratching frequently). Cardiovascular: Normal rate, regular rhythm and normal heart sounds. Pulmonary/Chest: Effort normal and breath sounds normal.   Neurological: She is alert and oriented to person, place, and time. Skin: Skin is warm and dry. Rash noted. Rash is papular (widely diffuse, nonvesicle, no bleeding or drainage, from neck to ankles). She is not diaphoretic. Highly suspect medication reaction, hydrocodone induced rash and itching. Started after beginning norco recently and has progressed to cover most of her body. Assessment & Plan   1. Medication reaction, initial encounter     2. Allergic dermatitis       No orders of the defined types were placed in this encounter. No orders of the defined types were placed in this encounter. There are no discontinued medications. Return in about 1 day (around 3/21/2018). Due to the medication that you are currently taking, and the risks to your health due to medical history, no medications for reaction can be given from this clinic. Contact your paliative care provider and your PCP as soon as possible. Recommend stopping the Norco until you can speak with your provider. Continue to use the Pepcid, Atarax, and steroids that you have been provided. Monitor blood sugar closely. Seek care at the ER if you have any difficulty breathing or facial swelling. Reviewed with the patient: current clinical status, medications, activities and diet. Side effects, adverse effects of the medication prescribed today, as well as treatment plan/ rationale and result expectations have been discussed with the patient who expresses understanding and desires to proceed. Close follow up to evaluate treatment results and for coordination of care. I have reviewed the patient's medical history in detail and updated the computerized patient record.     Joon Larios NP

## 2018-04-17 ENCOUNTER — HOSPITAL ENCOUNTER (OUTPATIENT)
Dept: ULTRASOUND IMAGING | Age: 73
Discharge: HOME | End: 2018-04-19
Payer: MEDICARE

## 2018-04-17 DIAGNOSIS — O22.30 DVT (DEEP VEIN THROMBOSIS) IN PREGNANCY: ICD-10-CM

## 2018-04-17 PROCEDURE — 93971 EXTREMITY STUDY: CPT

## 2018-04-19 NOTE — DISCHARGE SUMMARY
Physician Discharge Summary     Patient ID:  Karmen Negrete  [id number]  59 y.o.  1945    Admit date: 3/2/2018    Discharge date and time: 3/9/2018  3:01 PM     Admitting Physician: Will Galloway MD     Discharge Physician: Dr. Ortega Rutherford    Admission Diagnoses: Chest pain [R07.9]  Chest pain [R07.9]    Discharge Diagnoses: Lung cancer with mets, uncontrolled diabetes and generalized weakness    Admission Condition: fair    Discharged Condition: good    Indication for Admission: Chest pain    Dayan Horton is a 67 y/o F with a PMH sig for CAD, Lung cancer with metastasis, DMII who presents with chest pain and weakness.      Lung cancer with Metastasis: With generalized weakness  - Hem/Onc consulted. Per Dr. David Flores, Metastatic Non-small cell Lung CA with gradually worsening multiple bilateral lung mets and now with L3 bone mets. She is symptomatically stable. I had extensive discussion with the pt and her granddaughter about the CT ,MRI and bone scan findings . I also discussed with them about possible chemotx with Gemzar including discussion about possible side-effects The pt remains undecided about chemotx. The pt is OK for discharge [facility] oncology standpoint. The pt will follow-up in our office in 3 weeks. Intermittent bilateral leg weakness due to peripheral neuropathy from her diabetes as well as previous chemotx. No spinal cord compression on MRI of spine. Pt is being planned for physical therapy thru [facility].   - Neurology consulted. Per Dr. Bryan Browne, Diabetic neuropathy with chemotheraphty. 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 Needs aggerssive therapy. No new changes. Ambulated some. PT eavl notes. Not progressive  - MRI of thoracic and lumbar spine showed metastatic lesion without any significant spinal cord compression   - Pt given IV steroids Per Hem/Onc  - PT/OT     Chest pain: ACS ruled out.   - Cardio consulted.  Per Dr. Girma Boland, Chest pain in a patient with coronary artery disease with percutaneous

## 2018-05-29 ENCOUNTER — APPOINTMENT (OUTPATIENT)
Dept: GENERAL RADIOLOGY | Age: 73
DRG: 542 | End: 2018-05-29
Payer: MEDICARE

## 2018-05-29 ENCOUNTER — HOSPITAL ENCOUNTER (INPATIENT)
Age: 73
LOS: 7 days | Discharge: HOSPICE | DRG: 542 | End: 2018-06-05
Attending: INTERNAL MEDICINE | Admitting: INTERNAL MEDICINE
Payer: MEDICARE

## 2018-05-29 ENCOUNTER — APPOINTMENT (OUTPATIENT)
Dept: CT IMAGING | Age: 73
DRG: 542 | End: 2018-05-29
Payer: MEDICARE

## 2018-05-29 DIAGNOSIS — J18.9 PNEUMONIA DUE TO ORGANISM: Primary | ICD-10-CM

## 2018-05-29 PROBLEM — R07.9 CHEST PAIN: Status: RESOLVED | Noted: 2018-03-02 | Resolved: 2018-05-29

## 2018-05-29 LAB
ALBUMIN SERPL-MCNC: 3.6 G/DL (ref 3.9–4.9)
ALP BLD-CCNC: 174 U/L (ref 40–130)
ALT SERPL-CCNC: 20 U/L (ref 0–33)
ANION GAP SERPL CALCULATED.3IONS-SCNC: 18 MEQ/L (ref 7–13)
AST SERPL-CCNC: 24 U/L (ref 0–35)
BASOPHILS ABSOLUTE: 0.1 K/UL (ref 0–0.2)
BASOPHILS RELATIVE PERCENT: 0.6 %
BILIRUB SERPL-MCNC: 0.4 MG/DL (ref 0–1.2)
BUN BLDV-MCNC: 18 MG/DL (ref 8–23)
CALCIUM SERPL-MCNC: 9.6 MG/DL (ref 8.6–10.2)
CHLORIDE BLD-SCNC: 93 MEQ/L (ref 98–107)
CK MB: 8.4 NG/ML (ref 0–3.8)
CO2: 21 MEQ/L (ref 22–29)
CREAT SERPL-MCNC: 1.05 MG/DL (ref 0.5–0.9)
CREATINE KINASE-MB INDEX: 2.1 % (ref 0–3.5)
EOSINOPHILS ABSOLUTE: 0.1 K/UL (ref 0–0.7)
EOSINOPHILS RELATIVE PERCENT: 0.7 %
GFR AFRICAN AMERICAN: >60
GFR NON-AFRICAN AMERICAN: 51.5
GLOBULIN: 2.8 G/DL (ref 2.3–3.5)
GLUCOSE BLD-MCNC: 310 MG/DL (ref 60–115)
GLUCOSE BLD-MCNC: 310 MG/DL (ref 74–109)
GLUCOSE BLD-MCNC: 338 MG/DL (ref 60–115)
HCT VFR BLD CALC: 40.9 % (ref 37–47)
HEMOGLOBIN: 13.8 G/DL (ref 12–16)
INR BLD: 3
LACTIC ACID: 1.5 MMOL/L (ref 0.5–2.2)
LACTIC ACID: 2.3 MMOL/L (ref 0.5–2.2)
LYMPHOCYTES ABSOLUTE: 1.4 K/UL (ref 1–4.8)
LYMPHOCYTES RELATIVE PERCENT: 12.3 %
MCH RBC QN AUTO: 27.5 PG (ref 27–31.3)
MCHC RBC AUTO-ENTMCNC: 33.7 % (ref 33–37)
MCV RBC AUTO: 81.7 FL (ref 82–100)
MONOCYTES ABSOLUTE: 1 K/UL (ref 0.2–0.8)
MONOCYTES RELATIVE PERCENT: 8.7 %
NEUTROPHILS ABSOLUTE: 8.7 K/UL (ref 1.4–6.5)
NEUTROPHILS RELATIVE PERCENT: 77.7 %
PDW BLD-RTO: 17 % (ref 11.5–14.5)
PERFORMED ON: ABNORMAL
PERFORMED ON: ABNORMAL
PLATELET # BLD: 255 K/UL (ref 130–400)
POTASSIUM SERPL-SCNC: 3.7 MEQ/L (ref 3.5–5.1)
PRO-BNP: 313 PG/ML
PROTHROMBIN TIME: 31.5 SEC (ref 9.6–12.3)
RBC # BLD: 5.01 M/UL (ref 4.2–5.4)
SODIUM BLD-SCNC: 132 MEQ/L (ref 132–144)
TOTAL CK: 402 U/L (ref 0–170)
TOTAL PROTEIN: 6.4 G/DL (ref 6.4–8.1)
TROPONIN: 0.02 NG/ML (ref 0–0.01)
TSH SERPL DL<=0.05 MIU/L-ACNC: 2.29 UIU/ML (ref 0.27–4.2)
WBC # BLD: 11.1 K/UL (ref 4.8–10.8)

## 2018-05-29 PROCEDURE — 85610 PROTHROMBIN TIME: CPT

## 2018-05-29 PROCEDURE — 94640 AIRWAY INHALATION TREATMENT: CPT

## 2018-05-29 PROCEDURE — 93005 ELECTROCARDIOGRAM TRACING: CPT

## 2018-05-29 PROCEDURE — 70450 CT HEAD/BRAIN W/O DYE: CPT

## 2018-05-29 PROCEDURE — 84484 ASSAY OF TROPONIN QUANT: CPT

## 2018-05-29 PROCEDURE — 83880 ASSAY OF NATRIURETIC PEPTIDE: CPT

## 2018-05-29 PROCEDURE — 96375 TX/PRO/DX INJ NEW DRUG ADDON: CPT

## 2018-05-29 PROCEDURE — 6360000002 HC RX W HCPCS: Performed by: INTERNAL MEDICINE

## 2018-05-29 PROCEDURE — 83605 ASSAY OF LACTIC ACID: CPT

## 2018-05-29 PROCEDURE — 1210000000 HC MED SURG R&B

## 2018-05-29 PROCEDURE — 85025 COMPLETE CBC W/AUTO DIFF WBC: CPT

## 2018-05-29 PROCEDURE — 96365 THER/PROPH/DIAG IV INF INIT: CPT

## 2018-05-29 PROCEDURE — 73560 X-RAY EXAM OF KNEE 1 OR 2: CPT

## 2018-05-29 PROCEDURE — 80053 COMPREHEN METABOLIC PANEL: CPT

## 2018-05-29 PROCEDURE — 87040 BLOOD CULTURE FOR BACTERIA: CPT

## 2018-05-29 PROCEDURE — 6360000002 HC RX W HCPCS: Performed by: NURSE PRACTITIONER

## 2018-05-29 PROCEDURE — 36415 COLL VENOUS BLD VENIPUNCTURE: CPT

## 2018-05-29 PROCEDURE — 99285 EMERGENCY DEPT VISIT HI MDM: CPT

## 2018-05-29 PROCEDURE — 71045 X-RAY EXAM CHEST 1 VIEW: CPT

## 2018-05-29 PROCEDURE — 82550 ASSAY OF CK (CPK): CPT

## 2018-05-29 PROCEDURE — 84443 ASSAY THYROID STIM HORMONE: CPT

## 2018-05-29 PROCEDURE — 82553 CREATINE MB FRACTION: CPT

## 2018-05-29 PROCEDURE — 99223 1ST HOSP IP/OBS HIGH 75: CPT | Performed by: INTERNAL MEDICINE

## 2018-05-29 PROCEDURE — 6370000000 HC RX 637 (ALT 250 FOR IP): Performed by: INTERNAL MEDICINE

## 2018-05-29 RX ORDER — ALBUTEROL SULFATE 2.5 MG/3ML
2.5 SOLUTION RESPIRATORY (INHALATION)
Status: DISCONTINUED | OUTPATIENT
Start: 2018-05-29 | End: 2018-05-29

## 2018-05-29 RX ORDER — ONDANSETRON 2 MG/ML
4 INJECTION INTRAMUSCULAR; INTRAVENOUS EVERY 6 HOURS PRN
Status: DISCONTINUED | OUTPATIENT
Start: 2018-05-29 | End: 2018-06-05 | Stop reason: HOSPADM

## 2018-05-29 RX ORDER — POTASSIUM CHLORIDE 20 MEQ/1
10 TABLET, EXTENDED RELEASE ORAL DAILY
Status: DISCONTINUED | OUTPATIENT
Start: 2018-05-29 | End: 2018-06-05 | Stop reason: HOSPADM

## 2018-05-29 RX ORDER — WARFARIN SODIUM 2.5 MG/1
2.5 TABLET ORAL
Status: DISCONTINUED | OUTPATIENT
Start: 2018-05-29 | End: 2018-05-31

## 2018-05-29 RX ORDER — ISOSORBIDE MONONITRATE 30 MG/1
30 TABLET, EXTENDED RELEASE ORAL DAILY
Status: DISCONTINUED | OUTPATIENT
Start: 2018-05-29 | End: 2018-06-05 | Stop reason: HOSPADM

## 2018-05-29 RX ORDER — PRIMIDONE 50 MG/1
50 TABLET ORAL NIGHTLY
Status: DISCONTINUED | OUTPATIENT
Start: 2018-05-29 | End: 2018-06-05 | Stop reason: HOSPADM

## 2018-05-29 RX ORDER — HYDROXYZINE HYDROCHLORIDE 25 MG/1
25 TABLET, FILM COATED ORAL EVERY 8 HOURS PRN
Status: DISCONTINUED | OUTPATIENT
Start: 2018-05-29 | End: 2018-06-05 | Stop reason: HOSPADM

## 2018-05-29 RX ORDER — PROPRANOLOL HCL 60 MG
60 CAPSULE, EXTENDED RELEASE 24HR ORAL 2 TIMES DAILY
Status: DISCONTINUED | OUTPATIENT
Start: 2018-05-29 | End: 2018-06-05 | Stop reason: HOSPADM

## 2018-05-29 RX ORDER — DOCUSATE SODIUM 100 MG/1
100 CAPSULE, LIQUID FILLED ORAL 2 TIMES DAILY
Status: DISCONTINUED | OUTPATIENT
Start: 2018-05-29 | End: 2018-06-05 | Stop reason: HOSPADM

## 2018-05-29 RX ORDER — HYDROCHLOROTHIAZIDE 25 MG/1
25 TABLET ORAL DAILY
Status: DISCONTINUED | OUTPATIENT
Start: 2018-05-29 | End: 2018-06-05 | Stop reason: HOSPADM

## 2018-05-29 RX ORDER — LEVOFLOXACIN 5 MG/ML
750 INJECTION, SOLUTION INTRAVENOUS EVERY 24 HOURS
Status: DISCONTINUED | OUTPATIENT
Start: 2018-05-29 | End: 2018-06-04

## 2018-05-29 RX ORDER — ONDANSETRON 2 MG/ML
4 INJECTION INTRAMUSCULAR; INTRAVENOUS ONCE
Status: COMPLETED | OUTPATIENT
Start: 2018-05-29 | End: 2018-05-29

## 2018-05-29 RX ORDER — LEVOTHYROXINE SODIUM 0.15 MG/1
150 TABLET ORAL DAILY
Status: DISCONTINUED | OUTPATIENT
Start: 2018-05-29 | End: 2018-06-05 | Stop reason: HOSPADM

## 2018-05-29 RX ORDER — METFORMIN HYDROCHLORIDE 500 MG/1
1000 TABLET, EXTENDED RELEASE ORAL 2 TIMES DAILY
Status: DISCONTINUED | OUTPATIENT
Start: 2018-05-29 | End: 2018-05-31

## 2018-05-29 RX ORDER — LEVOFLOXACIN 5 MG/ML
750 INJECTION, SOLUTION INTRAVENOUS ONCE
Status: COMPLETED | OUTPATIENT
Start: 2018-05-29 | End: 2018-05-29

## 2018-05-29 RX ORDER — PANTOPRAZOLE SODIUM 40 MG/1
40 TABLET, DELAYED RELEASE ORAL
Status: DISCONTINUED | OUTPATIENT
Start: 2018-05-30 | End: 2018-06-05 | Stop reason: HOSPADM

## 2018-05-29 RX ORDER — NITROGLYCERIN 0.4 MG/1
0.4 TABLET SUBLINGUAL EVERY 5 MIN PRN
Status: DISCONTINUED | OUTPATIENT
Start: 2018-05-29 | End: 2018-06-05 | Stop reason: HOSPADM

## 2018-05-29 RX ORDER — ALBUTEROL SULFATE 2.5 MG/3ML
2.5 SOLUTION RESPIRATORY (INHALATION) 3 TIMES DAILY
Status: DISCONTINUED | OUTPATIENT
Start: 2018-05-29 | End: 2018-06-04

## 2018-05-29 RX ORDER — METHADONE HYDROCHLORIDE 5 MG/1
5 TABLET ORAL EVERY 6 HOURS PRN
COMMUNITY

## 2018-05-29 RX ORDER — FAMOTIDINE 20 MG/1
20 TABLET, FILM COATED ORAL 2 TIMES DAILY
Status: DISCONTINUED | OUTPATIENT
Start: 2018-05-29 | End: 2018-06-05 | Stop reason: HOSPADM

## 2018-05-29 RX ORDER — WARFARIN SODIUM 2.5 MG/1
5 TABLET ORAL
Status: DISCONTINUED | OUTPATIENT
Start: 2018-05-30 | End: 2018-05-31

## 2018-05-29 RX ORDER — SODIUM CHLORIDE 0.9 % (FLUSH) 0.9 %
10 SYRINGE (ML) INJECTION EVERY 12 HOURS SCHEDULED
Status: DISCONTINUED | OUTPATIENT
Start: 2018-05-29 | End: 2018-06-05 | Stop reason: HOSPADM

## 2018-05-29 RX ORDER — LEVETIRACETAM 250 MG/1
250 TABLET ORAL 2 TIMES DAILY
Status: DISCONTINUED | OUTPATIENT
Start: 2018-05-29 | End: 2018-06-05 | Stop reason: HOSPADM

## 2018-05-29 RX ORDER — HYDROCODONE BITARTRATE AND ACETAMINOPHEN 5; 325 MG/1; MG/1
1 TABLET ORAL EVERY 6 HOURS PRN
Status: DISCONTINUED | OUTPATIENT
Start: 2018-05-29 | End: 2018-06-05 | Stop reason: HOSPADM

## 2018-05-29 RX ORDER — NORTRIPTYLINE HYDROCHLORIDE 25 MG/1
25 CAPSULE ORAL NIGHTLY
Status: DISCONTINUED | OUTPATIENT
Start: 2018-05-29 | End: 2018-06-05 | Stop reason: HOSPADM

## 2018-05-29 RX ORDER — DEXTROSE MONOHYDRATE 25 G/50ML
12.5 INJECTION, SOLUTION INTRAVENOUS PRN
Status: DISCONTINUED | OUTPATIENT
Start: 2018-05-29 | End: 2018-06-05 | Stop reason: HOSPADM

## 2018-05-29 RX ORDER — PRAVASTATIN SODIUM 80 MG/1
80 TABLET ORAL DAILY
Status: DISCONTINUED | OUTPATIENT
Start: 2018-05-29 | End: 2018-06-05 | Stop reason: HOSPADM

## 2018-05-29 RX ORDER — NICOTINE POLACRILEX 4 MG
15 LOZENGE BUCCAL PRN
Status: DISCONTINUED | OUTPATIENT
Start: 2018-05-29 | End: 2018-06-05 | Stop reason: HOSPADM

## 2018-05-29 RX ORDER — MORPHINE SULFATE 20 MG/ML
50 SOLUTION ORAL
Status: ON HOLD | COMMUNITY
End: 2018-06-04 | Stop reason: HOSPADM

## 2018-05-29 RX ORDER — SODIUM CHLORIDE 0.9 % (FLUSH) 0.9 %
10 SYRINGE (ML) INJECTION PRN
Status: DISCONTINUED | OUTPATIENT
Start: 2018-05-29 | End: 2018-06-05 | Stop reason: HOSPADM

## 2018-05-29 RX ORDER — DEXTROSE MONOHYDRATE 50 MG/ML
100 INJECTION, SOLUTION INTRAVENOUS PRN
Status: DISCONTINUED | OUTPATIENT
Start: 2018-05-29 | End: 2018-06-05 | Stop reason: HOSPADM

## 2018-05-29 RX ADMIN — DOXEPIN HYDROCHLORIDE 200 MG: 25 CAPSULE ORAL at 20:19

## 2018-05-29 RX ADMIN — PROPRANOLOL HYDROCHLORIDE 60 MG: 60 CAPSULE, EXTENDED RELEASE ORAL at 20:24

## 2018-05-29 RX ADMIN — LEVOFLOXACIN 750 MG: 5 INJECTION, SOLUTION INTRAVENOUS at 13:03

## 2018-05-29 RX ADMIN — INSULIN HUMAN 20 UNITS: 100 INJECTION, SOLUTION PARENTERAL at 21:37

## 2018-05-29 RX ADMIN — METFORMIN HYDROCHLORIDE 1000 MG: 500 TABLET, EXTENDED RELEASE ORAL at 20:20

## 2018-05-29 RX ADMIN — ONDANSETRON 4 MG: 2 INJECTION INTRAMUSCULAR; INTRAVENOUS at 13:02

## 2018-05-29 RX ADMIN — HYDROCHLOROTHIAZIDE 25 MG: 25 TABLET ORAL at 18:21

## 2018-05-29 RX ADMIN — LEVOTHYROXINE SODIUM 150 MCG: 150 TABLET ORAL at 18:21

## 2018-05-29 RX ADMIN — Medication 1 MG: at 13:02

## 2018-05-29 RX ADMIN — ISOSORBIDE MONONITRATE 30 MG: 30 TABLET, EXTENDED RELEASE ORAL at 18:21

## 2018-05-29 RX ADMIN — POTASSIUM CHLORIDE 10 MEQ: 20 TABLET, EXTENDED RELEASE ORAL at 20:26

## 2018-05-29 RX ADMIN — DOCUSATE SODIUM 100 MG: 100 CAPSULE, LIQUID FILLED ORAL at 20:25

## 2018-05-29 RX ADMIN — WARFARIN SODIUM 2.5 MG: 2.5 TABLET ORAL at 20:17

## 2018-05-29 RX ADMIN — PRAVASTATIN SODIUM 80 MG: 80 TABLET ORAL at 20:24

## 2018-05-29 RX ADMIN — NORTRIPTYLINE HYDROCHLORIDE 25 MG: 25 CAPSULE ORAL at 20:24

## 2018-05-29 RX ADMIN — ALBUTEROL SULFATE 2.5 MG: 2.5 SOLUTION RESPIRATORY (INHALATION) at 20:03

## 2018-05-29 RX ADMIN — LEVETIRACETAM 250 MG: 250 TABLET, FILM COATED ORAL at 20:17

## 2018-05-29 RX ADMIN — HYDROCODONE BITARTRATE AND ACETAMINOPHEN 1 TABLET: 5; 325 TABLET ORAL at 18:22

## 2018-05-29 RX ADMIN — PRIMIDONE 50 MG: 50 TABLET ORAL at 20:25

## 2018-05-29 ASSESSMENT — ENCOUNTER SYMPTOMS
COUGH: 1
VOMITING: 0
NAUSEA: 0
DIARRHEA: 0
ABDOMINAL PAIN: 0
SHORTNESS OF BREATH: 0

## 2018-05-29 ASSESSMENT — PAIN DESCRIPTION - DESCRIPTORS
DESCRIPTORS: ACHING
DESCRIPTORS: ACHING

## 2018-05-29 ASSESSMENT — PAIN DESCRIPTION - ORIENTATION
ORIENTATION: RIGHT;LEFT
ORIENTATION: RIGHT;LEFT
ORIENTATION: RIGHT

## 2018-05-29 ASSESSMENT — PAIN SCALES - GENERAL
PAINLEVEL_OUTOF10: 7
PAINLEVEL_OUTOF10: 2
PAINLEVEL_OUTOF10: 10
PAINLEVEL_OUTOF10: 10
PAINLEVEL_OUTOF10: 7
PAINLEVEL_OUTOF10: 6

## 2018-05-29 ASSESSMENT — PAIN DESCRIPTION - PAIN TYPE
TYPE: ACUTE PAIN

## 2018-05-29 ASSESSMENT — PAIN DESCRIPTION - PROGRESSION
CLINICAL_PROGRESSION: GRADUALLY IMPROVING
CLINICAL_PROGRESSION: GRADUALLY IMPROVING

## 2018-05-29 ASSESSMENT — PAIN DESCRIPTION - FREQUENCY
FREQUENCY: CONTINUOUS
FREQUENCY: CONTINUOUS

## 2018-05-29 ASSESSMENT — PAIN DESCRIPTION - LOCATION
LOCATION: KNEE

## 2018-05-30 ENCOUNTER — APPOINTMENT (OUTPATIENT)
Dept: MRI IMAGING | Age: 73
DRG: 542 | End: 2018-05-30
Payer: MEDICARE

## 2018-05-30 ENCOUNTER — APPOINTMENT (OUTPATIENT)
Dept: ULTRASOUND IMAGING | Age: 73
DRG: 542 | End: 2018-05-30
Payer: MEDICARE

## 2018-05-30 LAB
GLUCOSE BLD-MCNC: 263 MG/DL (ref 60–115)
GLUCOSE BLD-MCNC: 301 MG/DL (ref 60–115)
GLUCOSE BLD-MCNC: 313 MG/DL (ref 60–115)
GLUCOSE BLD-MCNC: 318 MG/DL (ref 60–115)
GLUCOSE BLD-MCNC: 324 MG/DL (ref 60–115)
HBA1C MFR BLD: 12.6 % (ref 4.8–5.9)
INR BLD: 3
PERFORMED ON: ABNORMAL
PROTHROMBIN TIME: 31.6 SEC (ref 9.6–12.3)

## 2018-05-30 PROCEDURE — 83036 HEMOGLOBIN GLYCOSYLATED A1C: CPT

## 2018-05-30 PROCEDURE — 94640 AIRWAY INHALATION TREATMENT: CPT

## 2018-05-30 PROCEDURE — 93005 ELECTROCARDIOGRAM TRACING: CPT

## 2018-05-30 PROCEDURE — 93971 EXTREMITY STUDY: CPT

## 2018-05-30 PROCEDURE — 1210000000 HC MED SURG R&B

## 2018-05-30 PROCEDURE — 6360000002 HC RX W HCPCS: Performed by: INTERNAL MEDICINE

## 2018-05-30 PROCEDURE — 95816 EEG AWAKE AND DROWSY: CPT

## 2018-05-30 PROCEDURE — 99232 SBSQ HOSP IP/OBS MODERATE 35: CPT | Performed by: INTERNAL MEDICINE

## 2018-05-30 PROCEDURE — 6370000000 HC RX 637 (ALT 250 FOR IP): Performed by: INTERNAL MEDICINE

## 2018-05-30 PROCEDURE — 36415 COLL VENOUS BLD VENIPUNCTURE: CPT

## 2018-05-30 PROCEDURE — 2700000000 HC OXYGEN THERAPY PER DAY

## 2018-05-30 PROCEDURE — 93010 ELECTROCARDIOGRAM REPORT: CPT | Performed by: INTERNAL MEDICINE

## 2018-05-30 PROCEDURE — 94760 N-INVAS EAR/PLS OXIMETRY 1: CPT

## 2018-05-30 PROCEDURE — 85610 PROTHROMBIN TIME: CPT

## 2018-05-30 PROCEDURE — 72148 MRI LUMBAR SPINE W/O DYE: CPT

## 2018-05-30 PROCEDURE — 2580000003 HC RX 258: Performed by: INTERNAL MEDICINE

## 2018-05-30 PROCEDURE — 6360000002 HC RX W HCPCS: Performed by: PSYCHIATRY & NEUROLOGY

## 2018-05-30 RX ORDER — LORAZEPAM 2 MG/ML
1 INJECTION INTRAMUSCULAR
Status: COMPLETED | OUTPATIENT
Start: 2018-05-30 | End: 2018-05-30

## 2018-05-30 RX ORDER — DEXAMETHASONE SODIUM PHOSPHATE 4 MG/ML
4 INJECTION, SOLUTION INTRA-ARTICULAR; INTRALESIONAL; INTRAMUSCULAR; INTRAVENOUS; SOFT TISSUE EVERY 8 HOURS
Status: DISCONTINUED | OUTPATIENT
Start: 2018-05-30 | End: 2018-06-04

## 2018-05-30 RX ADMIN — DEXAMETHASONE SODIUM PHOSPHATE 4 MG: 4 INJECTION, SOLUTION INTRAMUSCULAR; INTRAVENOUS at 11:30

## 2018-05-30 RX ADMIN — WARFARIN SODIUM 5 MG: 2.5 TABLET ORAL at 18:14

## 2018-05-30 RX ADMIN — NORTRIPTYLINE HYDROCHLORIDE 25 MG: 25 CAPSULE ORAL at 20:27

## 2018-05-30 RX ADMIN — DEXAMETHASONE SODIUM PHOSPHATE 4 MG: 4 INJECTION, SOLUTION INTRAMUSCULAR; INTRAVENOUS at 18:43

## 2018-05-30 RX ADMIN — FAMOTIDINE 20 MG: 20 TABLET ORAL at 09:43

## 2018-05-30 RX ADMIN — ALBUTEROL SULFATE 2.5 MG: 2.5 SOLUTION RESPIRATORY (INHALATION) at 08:28

## 2018-05-30 RX ADMIN — HYDROCODONE BITARTRATE AND ACETAMINOPHEN 1 TABLET: 5; 325 TABLET ORAL at 09:51

## 2018-05-30 RX ADMIN — INSULIN HUMAN 60 UNITS: 100 INJECTION, SUSPENSION SUBCUTANEOUS at 09:49

## 2018-05-30 RX ADMIN — ALBUTEROL SULFATE 2.5 MG: 2.5 SOLUTION RESPIRATORY (INHALATION) at 19:58

## 2018-05-30 RX ADMIN — LEVOTHYROXINE SODIUM 150 MCG: 150 TABLET ORAL at 06:30

## 2018-05-30 RX ADMIN — DOCUSATE SODIUM 100 MG: 100 CAPSULE, LIQUID FILLED ORAL at 20:33

## 2018-05-30 RX ADMIN — LEVOFLOXACIN 750 MG: 5 INJECTION, SOLUTION INTRAVENOUS at 15:52

## 2018-05-30 RX ADMIN — PROPRANOLOL HYDROCHLORIDE 60 MG: 60 CAPSULE, EXTENDED RELEASE ORAL at 09:44

## 2018-05-30 RX ADMIN — Medication 10 ML: at 20:33

## 2018-05-30 RX ADMIN — LEVETIRACETAM 250 MG: 250 TABLET, FILM COATED ORAL at 09:39

## 2018-05-30 RX ADMIN — DOCUSATE SODIUM 100 MG: 100 CAPSULE, LIQUID FILLED ORAL at 09:43

## 2018-05-30 RX ADMIN — PROPRANOLOL HYDROCHLORIDE 60 MG: 60 CAPSULE, EXTENDED RELEASE ORAL at 20:27

## 2018-05-30 RX ADMIN — HYDROCHLOROTHIAZIDE 25 MG: 25 TABLET ORAL at 09:44

## 2018-05-30 RX ADMIN — ISOSORBIDE MONONITRATE 30 MG: 30 TABLET, EXTENDED RELEASE ORAL at 09:44

## 2018-05-30 RX ADMIN — PRIMIDONE 50 MG: 50 TABLET ORAL at 20:27

## 2018-05-30 RX ADMIN — PRAVASTATIN SODIUM 80 MG: 80 TABLET ORAL at 20:27

## 2018-05-30 RX ADMIN — INSULIN HUMAN 20 UNITS: 100 INJECTION, SOLUTION PARENTERAL at 17:55

## 2018-05-30 RX ADMIN — POTASSIUM CHLORIDE 10 MEQ: 20 TABLET, EXTENDED RELEASE ORAL at 10:00

## 2018-05-30 RX ADMIN — INSULIN HUMAN 60 UNITS: 100 INJECTION, SUSPENSION SUBCUTANEOUS at 17:55

## 2018-05-30 RX ADMIN — LEVETIRACETAM 250 MG: 250 TABLET, FILM COATED ORAL at 20:27

## 2018-05-30 RX ADMIN — LORAZEPAM 1 MG: 2 INJECTION INTRAMUSCULAR; INTRAVENOUS at 11:29

## 2018-05-30 RX ADMIN — PANTOPRAZOLE SODIUM 40 MG: 40 TABLET, DELAYED RELEASE ORAL at 06:30

## 2018-05-30 RX ADMIN — INSULIN HUMAN 20 UNITS: 100 INJECTION, SOLUTION PARENTERAL at 09:48

## 2018-05-30 RX ADMIN — Medication 10 ML: at 11:30

## 2018-05-30 RX ADMIN — DOXEPIN HYDROCHLORIDE 200 MG: 25 CAPSULE ORAL at 20:27

## 2018-05-30 RX ADMIN — FAMOTIDINE 20 MG: 20 TABLET ORAL at 20:27

## 2018-05-30 RX ADMIN — INSULIN HUMAN 20 UNITS: 100 INJECTION, SOLUTION PARENTERAL at 14:07

## 2018-05-30 RX ADMIN — METFORMIN HYDROCHLORIDE 1000 MG: 500 TABLET, EXTENDED RELEASE ORAL at 09:40

## 2018-05-30 ASSESSMENT — PAIN DESCRIPTION - PROGRESSION
CLINICAL_PROGRESSION: GRADUALLY IMPROVING

## 2018-05-30 ASSESSMENT — ENCOUNTER SYMPTOMS
GASTROINTESTINAL NEGATIVE: 1
SPUTUM PRODUCTION: 1
COUGH: 1
WHEEZING: 0
EYES NEGATIVE: 1
SHORTNESS OF BREATH: 1

## 2018-05-30 ASSESSMENT — PAIN DESCRIPTION - LOCATION
LOCATION: KNEE;LEG
LOCATION: KNEE;LEG

## 2018-05-30 ASSESSMENT — PAIN DESCRIPTION - ORIENTATION
ORIENTATION: RIGHT
ORIENTATION: RIGHT

## 2018-05-30 ASSESSMENT — PAIN SCALES - GENERAL
PAINLEVEL_OUTOF10: 10
PAINLEVEL_OUTOF10: 9
PAINLEVEL_OUTOF10: 0

## 2018-05-30 ASSESSMENT — PAIN DESCRIPTION - FREQUENCY: FREQUENCY: CONTINUOUS

## 2018-05-30 ASSESSMENT — PAIN DESCRIPTION - DESCRIPTORS: DESCRIPTORS: ACHING;JABBING;SHARP

## 2018-05-30 ASSESSMENT — PAIN DESCRIPTION - PAIN TYPE
TYPE: ACUTE PAIN
TYPE: ACUTE PAIN

## 2018-05-31 LAB
GLUCOSE BLD-MCNC: 242 MG/DL (ref 60–115)
GLUCOSE BLD-MCNC: 248 MG/DL (ref 60–115)
GLUCOSE BLD-MCNC: 276 MG/DL (ref 60–115)
GLUCOSE BLD-MCNC: 366 MG/DL (ref 60–115)
GLUCOSE BLD-MCNC: 376 MG/DL (ref 60–115)
INR BLD: 4.8
PERFORMED ON: ABNORMAL
PROTHROMBIN TIME: 51.1 SEC (ref 9.6–12.3)

## 2018-05-31 PROCEDURE — 85610 PROTHROMBIN TIME: CPT

## 2018-05-31 PROCEDURE — 99232 SBSQ HOSP IP/OBS MODERATE 35: CPT | Performed by: INTERNAL MEDICINE

## 2018-05-31 PROCEDURE — 2580000003 HC RX 258: Performed by: INTERNAL MEDICINE

## 2018-05-31 PROCEDURE — 94760 N-INVAS EAR/PLS OXIMETRY 1: CPT

## 2018-05-31 PROCEDURE — 1210000000 HC MED SURG R&B

## 2018-05-31 PROCEDURE — 93010 ELECTROCARDIOGRAM REPORT: CPT | Performed by: INTERNAL MEDICINE

## 2018-05-31 PROCEDURE — 6360000002 HC RX W HCPCS: Performed by: INTERNAL MEDICINE

## 2018-05-31 PROCEDURE — 36415 COLL VENOUS BLD VENIPUNCTURE: CPT

## 2018-05-31 PROCEDURE — 2700000000 HC OXYGEN THERAPY PER DAY

## 2018-05-31 PROCEDURE — 94640 AIRWAY INHALATION TREATMENT: CPT

## 2018-05-31 PROCEDURE — 6370000000 HC RX 637 (ALT 250 FOR IP): Performed by: INTERNAL MEDICINE

## 2018-05-31 RX ADMIN — DOCUSATE SODIUM 100 MG: 100 CAPSULE, LIQUID FILLED ORAL at 10:00

## 2018-05-31 RX ADMIN — HYDROCODONE BITARTRATE AND ACETAMINOPHEN 1 TABLET: 5; 325 TABLET ORAL at 01:08

## 2018-05-31 RX ADMIN — INSULIN HUMAN 60 UNITS: 100 INJECTION, SUSPENSION SUBCUTANEOUS at 17:59

## 2018-05-31 RX ADMIN — DOCUSATE SODIUM 100 MG: 100 CAPSULE, LIQUID FILLED ORAL at 21:43

## 2018-05-31 RX ADMIN — HYDROCODONE BITARTRATE AND ACETAMINOPHEN 1 TABLET: 5; 325 TABLET ORAL at 21:43

## 2018-05-31 RX ADMIN — LEVETIRACETAM 250 MG: 250 TABLET, FILM COATED ORAL at 10:00

## 2018-05-31 RX ADMIN — ALBUTEROL SULFATE 2.5 MG: 2.5 SOLUTION RESPIRATORY (INHALATION) at 21:34

## 2018-05-31 RX ADMIN — NORTRIPTYLINE HYDROCHLORIDE 25 MG: 25 CAPSULE ORAL at 21:43

## 2018-05-31 RX ADMIN — FAMOTIDINE 20 MG: 20 TABLET ORAL at 21:43

## 2018-05-31 RX ADMIN — ALBUTEROL SULFATE 2.5 MG: 2.5 SOLUTION RESPIRATORY (INHALATION) at 07:49

## 2018-05-31 RX ADMIN — PANTOPRAZOLE SODIUM 40 MG: 40 TABLET, DELAYED RELEASE ORAL at 06:11

## 2018-05-31 RX ADMIN — INSULIN HUMAN 60 UNITS: 100 INJECTION, SUSPENSION SUBCUTANEOUS at 10:09

## 2018-05-31 RX ADMIN — DEXAMETHASONE SODIUM PHOSPHATE 4 MG: 4 INJECTION, SOLUTION INTRAMUSCULAR; INTRAVENOUS at 21:48

## 2018-05-31 RX ADMIN — PRIMIDONE 50 MG: 50 TABLET ORAL at 21:43

## 2018-05-31 RX ADMIN — POTASSIUM CHLORIDE 10 MEQ: 20 TABLET, EXTENDED RELEASE ORAL at 10:01

## 2018-05-31 RX ADMIN — Medication 10 ML: at 14:08

## 2018-05-31 RX ADMIN — LEVETIRACETAM 250 MG: 250 TABLET, FILM COATED ORAL at 21:44

## 2018-05-31 RX ADMIN — INSULIN HUMAN 20 UNITS: 100 INJECTION, SOLUTION PARENTERAL at 17:58

## 2018-05-31 RX ADMIN — INSULIN HUMAN 20 UNITS: 100 INJECTION, SOLUTION PARENTERAL at 10:09

## 2018-05-31 RX ADMIN — FAMOTIDINE 20 MG: 20 TABLET ORAL at 10:02

## 2018-05-31 RX ADMIN — PRAVASTATIN SODIUM 80 MG: 80 TABLET ORAL at 22:49

## 2018-05-31 RX ADMIN — INSULIN HUMAN 20 UNITS: 100 INJECTION, SOLUTION PARENTERAL at 12:31

## 2018-05-31 RX ADMIN — DOXEPIN HYDROCHLORIDE 200 MG: 25 CAPSULE ORAL at 21:43

## 2018-05-31 RX ADMIN — HYDROCHLOROTHIAZIDE 25 MG: 25 TABLET ORAL at 10:00

## 2018-05-31 RX ADMIN — LEVOTHYROXINE SODIUM 150 MCG: 150 TABLET ORAL at 06:11

## 2018-05-31 RX ADMIN — DEXAMETHASONE SODIUM PHOSPHATE 4 MG: 4 INJECTION, SOLUTION INTRAMUSCULAR; INTRAVENOUS at 14:08

## 2018-05-31 RX ADMIN — LEVOFLOXACIN 750 MG: 5 INJECTION, SOLUTION INTRAVENOUS at 14:45

## 2018-05-31 RX ADMIN — ISOSORBIDE MONONITRATE 30 MG: 30 TABLET, EXTENDED RELEASE ORAL at 10:00

## 2018-05-31 RX ADMIN — Medication 10 ML: at 10:16

## 2018-05-31 RX ADMIN — ALBUTEROL SULFATE 2.5 MG: 2.5 SOLUTION RESPIRATORY (INHALATION) at 15:23

## 2018-05-31 RX ADMIN — Medication 10 ML: at 21:48

## 2018-05-31 RX ADMIN — PROPRANOLOL HYDROCHLORIDE 60 MG: 60 CAPSULE, EXTENDED RELEASE ORAL at 21:44

## 2018-05-31 RX ADMIN — DEXAMETHASONE SODIUM PHOSPHATE 4 MG: 4 INJECTION, SOLUTION INTRAMUSCULAR; INTRAVENOUS at 01:10

## 2018-05-31 RX ADMIN — PROPRANOLOL HYDROCHLORIDE 60 MG: 60 CAPSULE, EXTENDED RELEASE ORAL at 10:00

## 2018-05-31 ASSESSMENT — ENCOUNTER SYMPTOMS
VOMITING: 0
NAUSEA: 0

## 2018-05-31 ASSESSMENT — PAIN SCALES - GENERAL
PAINLEVEL_OUTOF10: 0
PAINLEVEL_OUTOF10: 5
PAINLEVEL_OUTOF10: 7
PAINLEVEL_OUTOF10: 10

## 2018-06-01 LAB
GLUCOSE BLD-MCNC: 302 MG/DL (ref 60–115)
GLUCOSE BLD-MCNC: 308 MG/DL (ref 60–115)
GLUCOSE BLD-MCNC: 347 MG/DL (ref 60–115)
INR BLD: 7.9
PERFORMED ON: ABNORMAL
PROTHROMBIN TIME: 84.6 SEC (ref 9.6–12.3)

## 2018-06-01 PROCEDURE — 2580000003 HC RX 258: Performed by: INTERNAL MEDICINE

## 2018-06-01 PROCEDURE — 85610 PROTHROMBIN TIME: CPT

## 2018-06-01 PROCEDURE — 6360000002 HC RX W HCPCS: Performed by: INTERNAL MEDICINE

## 2018-06-01 PROCEDURE — 94664 DEMO&/EVAL PT USE INHALER: CPT

## 2018-06-01 PROCEDURE — 94760 N-INVAS EAR/PLS OXIMETRY 1: CPT

## 2018-06-01 PROCEDURE — 6370000000 HC RX 637 (ALT 250 FOR IP): Performed by: INTERNAL MEDICINE

## 2018-06-01 PROCEDURE — 99232 SBSQ HOSP IP/OBS MODERATE 35: CPT | Performed by: INTERNAL MEDICINE

## 2018-06-01 PROCEDURE — 2700000000 HC OXYGEN THERAPY PER DAY

## 2018-06-01 PROCEDURE — 36415 COLL VENOUS BLD VENIPUNCTURE: CPT

## 2018-06-01 PROCEDURE — 1210000000 HC MED SURG R&B

## 2018-06-01 PROCEDURE — 94640 AIRWAY INHALATION TREATMENT: CPT

## 2018-06-01 RX ADMIN — LEVETIRACETAM 250 MG: 250 TABLET, FILM COATED ORAL at 09:19

## 2018-06-01 RX ADMIN — HYDROCODONE BITARTRATE AND ACETAMINOPHEN 1 TABLET: 5; 325 TABLET ORAL at 17:24

## 2018-06-01 RX ADMIN — ALBUTEROL SULFATE 2.5 MG: 2.5 SOLUTION RESPIRATORY (INHALATION) at 19:50

## 2018-06-01 RX ADMIN — ALBUTEROL SULFATE 2.5 MG: 2.5 SOLUTION RESPIRATORY (INHALATION) at 13:10

## 2018-06-01 RX ADMIN — INSULIN HUMAN 20 UNITS: 100 INJECTION, SOLUTION PARENTERAL at 12:23

## 2018-06-01 RX ADMIN — DOCUSATE SODIUM 100 MG: 100 CAPSULE, LIQUID FILLED ORAL at 22:29

## 2018-06-01 RX ADMIN — LEVOFLOXACIN 750 MG: 5 INJECTION, SOLUTION INTRAVENOUS at 15:13

## 2018-06-01 RX ADMIN — PANTOPRAZOLE SODIUM 40 MG: 40 TABLET, DELAYED RELEASE ORAL at 06:19

## 2018-06-01 RX ADMIN — ISOSORBIDE MONONITRATE 30 MG: 30 TABLET, EXTENDED RELEASE ORAL at 09:19

## 2018-06-01 RX ADMIN — DOCUSATE SODIUM 100 MG: 100 CAPSULE, LIQUID FILLED ORAL at 09:19

## 2018-06-01 RX ADMIN — LEVOTHYROXINE SODIUM 150 MCG: 150 TABLET ORAL at 06:19

## 2018-06-01 RX ADMIN — INSULIN HUMAN 20 UNITS: 100 INJECTION, SOLUTION PARENTERAL at 09:22

## 2018-06-01 RX ADMIN — DEXAMETHASONE SODIUM PHOSPHATE 4 MG: 4 INJECTION, SOLUTION INTRAMUSCULAR; INTRAVENOUS at 09:32

## 2018-06-01 RX ADMIN — PROPRANOLOL HYDROCHLORIDE 60 MG: 60 CAPSULE, EXTENDED RELEASE ORAL at 09:18

## 2018-06-01 RX ADMIN — Medication 10 ML: at 09:28

## 2018-06-01 RX ADMIN — FAMOTIDINE 20 MG: 20 TABLET ORAL at 22:29

## 2018-06-01 RX ADMIN — DOXEPIN HYDROCHLORIDE 200 MG: 25 CAPSULE ORAL at 22:28

## 2018-06-01 RX ADMIN — PROPRANOLOL HYDROCHLORIDE 60 MG: 60 CAPSULE, EXTENDED RELEASE ORAL at 22:29

## 2018-06-01 RX ADMIN — INSULIN HUMAN 20 UNITS: 100 INJECTION, SOLUTION PARENTERAL at 16:27

## 2018-06-01 RX ADMIN — INSULIN HUMAN 60 UNITS: 100 INJECTION, SUSPENSION SUBCUTANEOUS at 16:27

## 2018-06-01 RX ADMIN — HYDROCHLOROTHIAZIDE 25 MG: 25 TABLET ORAL at 09:19

## 2018-06-01 RX ADMIN — DEXAMETHASONE SODIUM PHOSPHATE 4 MG: 4 INJECTION, SOLUTION INTRAMUSCULAR; INTRAVENOUS at 02:56

## 2018-06-01 RX ADMIN — Medication 10 ML: at 02:57

## 2018-06-01 RX ADMIN — ALBUTEROL SULFATE 2.5 MG: 2.5 SOLUTION RESPIRATORY (INHALATION) at 07:39

## 2018-06-01 RX ADMIN — NORTRIPTYLINE HYDROCHLORIDE 25 MG: 25 CAPSULE ORAL at 22:28

## 2018-06-01 RX ADMIN — INSULIN HUMAN 60 UNITS: 100 INJECTION, SUSPENSION SUBCUTANEOUS at 09:22

## 2018-06-01 RX ADMIN — FAMOTIDINE 20 MG: 20 TABLET ORAL at 09:19

## 2018-06-01 RX ADMIN — DEXAMETHASONE SODIUM PHOSPHATE 4 MG: 4 INJECTION, SOLUTION INTRAMUSCULAR; INTRAVENOUS at 17:24

## 2018-06-01 RX ADMIN — LEVETIRACETAM 250 MG: 250 TABLET, FILM COATED ORAL at 22:28

## 2018-06-01 RX ADMIN — Medication 10 ML: at 22:33

## 2018-06-01 RX ADMIN — PRIMIDONE 50 MG: 50 TABLET ORAL at 22:29

## 2018-06-01 RX ADMIN — MAGNESIUM HYDROXIDE 30 ML: 400 SUSPENSION ORAL at 22:29

## 2018-06-01 RX ADMIN — PRAVASTATIN SODIUM 80 MG: 80 TABLET ORAL at 09:18

## 2018-06-01 RX ADMIN — POTASSIUM CHLORIDE 10 MEQ: 20 TABLET, EXTENDED RELEASE ORAL at 09:19

## 2018-06-01 ASSESSMENT — ENCOUNTER SYMPTOMS
VOMITING: 0
NAUSEA: 0

## 2018-06-01 ASSESSMENT — PAIN SCALES - GENERAL
PAINLEVEL_OUTOF10: 0
PAINLEVEL_OUTOF10: 10

## 2018-06-02 LAB
GLUCOSE BLD-MCNC: 199 MG/DL (ref 60–115)
GLUCOSE BLD-MCNC: 206 MG/DL (ref 60–115)
GLUCOSE BLD-MCNC: 215 MG/DL (ref 60–115)
GLUCOSE BLD-MCNC: 276 MG/DL (ref 60–115)
GLUCOSE BLD-MCNC: 301 MG/DL (ref 60–115)
INR BLD: 5.7
PERFORMED ON: ABNORMAL
PROTHROMBIN TIME: 60.8 SEC (ref 9.6–12.3)

## 2018-06-02 PROCEDURE — 6370000000 HC RX 637 (ALT 250 FOR IP): Performed by: INTERNAL MEDICINE

## 2018-06-02 PROCEDURE — 94640 AIRWAY INHALATION TREATMENT: CPT

## 2018-06-02 PROCEDURE — 36415 COLL VENOUS BLD VENIPUNCTURE: CPT

## 2018-06-02 PROCEDURE — 2580000003 HC RX 258: Performed by: INTERNAL MEDICINE

## 2018-06-02 PROCEDURE — 85610 PROTHROMBIN TIME: CPT

## 2018-06-02 PROCEDURE — 6360000002 HC RX W HCPCS: Performed by: INTERNAL MEDICINE

## 2018-06-02 PROCEDURE — 1210000000 HC MED SURG R&B

## 2018-06-02 PROCEDURE — 2700000000 HC OXYGEN THERAPY PER DAY

## 2018-06-02 RX ORDER — PHYTONADIONE 5 MG/1
5 TABLET ORAL ONCE
Status: COMPLETED | OUTPATIENT
Start: 2018-06-02 | End: 2018-06-02

## 2018-06-02 RX ORDER — ALPRAZOLAM 0.25 MG/1
0.25 TABLET ORAL 3 TIMES DAILY PRN
Status: DISCONTINUED | OUTPATIENT
Start: 2018-06-02 | End: 2018-06-05 | Stop reason: HOSPADM

## 2018-06-02 RX ADMIN — INSULIN HUMAN 20 UNITS: 100 INJECTION, SOLUTION PARENTERAL at 10:00

## 2018-06-02 RX ADMIN — ALPRAZOLAM 0.25 MG: 0.25 TABLET ORAL at 16:01

## 2018-06-02 RX ADMIN — PROPRANOLOL HYDROCHLORIDE 60 MG: 60 CAPSULE, EXTENDED RELEASE ORAL at 20:46

## 2018-06-02 RX ADMIN — LEVOFLOXACIN 750 MG: 5 INJECTION, SOLUTION INTRAVENOUS at 15:59

## 2018-06-02 RX ADMIN — INSULIN HUMAN 60 UNITS: 100 INJECTION, SUSPENSION SUBCUTANEOUS at 18:13

## 2018-06-02 RX ADMIN — DOCUSATE SODIUM 100 MG: 100 CAPSULE, LIQUID FILLED ORAL at 20:46

## 2018-06-02 RX ADMIN — PANTOPRAZOLE SODIUM 40 MG: 40 TABLET, DELAYED RELEASE ORAL at 05:45

## 2018-06-02 RX ADMIN — DOXEPIN HYDROCHLORIDE 200 MG: 25 CAPSULE ORAL at 20:47

## 2018-06-02 RX ADMIN — DOCUSATE SODIUM 100 MG: 100 CAPSULE, LIQUID FILLED ORAL at 09:53

## 2018-06-02 RX ADMIN — FAMOTIDINE 20 MG: 20 TABLET ORAL at 09:53

## 2018-06-02 RX ADMIN — ALBUTEROL SULFATE 2.5 MG: 2.5 SOLUTION RESPIRATORY (INHALATION) at 13:29

## 2018-06-02 RX ADMIN — LEVETIRACETAM 250 MG: 250 TABLET, FILM COATED ORAL at 20:47

## 2018-06-02 RX ADMIN — NORTRIPTYLINE HYDROCHLORIDE 25 MG: 25 CAPSULE ORAL at 20:45

## 2018-06-02 RX ADMIN — ALBUTEROL SULFATE 2.5 MG: 2.5 SOLUTION RESPIRATORY (INHALATION) at 08:25

## 2018-06-02 RX ADMIN — PRAVASTATIN SODIUM 80 MG: 80 TABLET ORAL at 09:54

## 2018-06-02 RX ADMIN — LEVETIRACETAM 250 MG: 250 TABLET, FILM COATED ORAL at 09:54

## 2018-06-02 RX ADMIN — DEXAMETHASONE SODIUM PHOSPHATE 4 MG: 4 INJECTION, SOLUTION INTRAMUSCULAR; INTRAVENOUS at 02:06

## 2018-06-02 RX ADMIN — HYDROCHLOROTHIAZIDE 25 MG: 25 TABLET ORAL at 09:54

## 2018-06-02 RX ADMIN — PRIMIDONE 50 MG: 50 TABLET ORAL at 20:47

## 2018-06-02 RX ADMIN — INSULIN HUMAN 60 UNITS: 100 INJECTION, SUSPENSION SUBCUTANEOUS at 10:00

## 2018-06-02 RX ADMIN — Medication 10 ML: at 02:07

## 2018-06-02 RX ADMIN — ISOSORBIDE MONONITRATE 30 MG: 30 TABLET, EXTENDED RELEASE ORAL at 09:52

## 2018-06-02 RX ADMIN — HYDROCODONE BITARTRATE AND ACETAMINOPHEN 1 TABLET: 5; 325 TABLET ORAL at 09:56

## 2018-06-02 RX ADMIN — PHYTONADIONE 5 MG: 5 TABLET ORAL at 12:31

## 2018-06-02 RX ADMIN — ALBUTEROL SULFATE 2.5 MG: 2.5 SOLUTION RESPIRATORY (INHALATION) at 19:45

## 2018-06-02 RX ADMIN — DEXAMETHASONE SODIUM PHOSPHATE 4 MG: 4 INJECTION, SOLUTION INTRAMUSCULAR; INTRAVENOUS at 10:35

## 2018-06-02 RX ADMIN — Medication 10 ML: at 10:06

## 2018-06-02 RX ADMIN — Medication 10 ML: at 20:48

## 2018-06-02 RX ADMIN — LEVOTHYROXINE SODIUM 150 MCG: 150 TABLET ORAL at 05:45

## 2018-06-02 RX ADMIN — ALPRAZOLAM 0.25 MG: 0.25 TABLET ORAL at 20:46

## 2018-06-02 RX ADMIN — DEXAMETHASONE SODIUM PHOSPHATE 4 MG: 4 INJECTION, SOLUTION INTRAMUSCULAR; INTRAVENOUS at 18:11

## 2018-06-02 RX ADMIN — POTASSIUM CHLORIDE 10 MEQ: 20 TABLET, EXTENDED RELEASE ORAL at 09:53

## 2018-06-02 RX ADMIN — INSULIN HUMAN 20 UNITS: 100 INJECTION, SOLUTION PARENTERAL at 18:13

## 2018-06-02 RX ADMIN — INSULIN HUMAN 20 UNITS: 100 INJECTION, SOLUTION PARENTERAL at 12:26

## 2018-06-02 RX ADMIN — PROPRANOLOL HYDROCHLORIDE 60 MG: 60 CAPSULE, EXTENDED RELEASE ORAL at 09:53

## 2018-06-02 RX ADMIN — FAMOTIDINE 20 MG: 20 TABLET ORAL at 20:47

## 2018-06-02 ASSESSMENT — PAIN SCALES - GENERAL
PAINLEVEL_OUTOF10: 8
PAINLEVEL_OUTOF10: 0
PAINLEVEL_OUTOF10: 8
PAINLEVEL_OUTOF10: 2
PAINLEVEL_OUTOF10: 0
PAINLEVEL_OUTOF10: 5

## 2018-06-02 ASSESSMENT — PAIN DESCRIPTION - LOCATION: LOCATION: LEG

## 2018-06-02 ASSESSMENT — PAIN DESCRIPTION - ORIENTATION: ORIENTATION: RIGHT;LEFT

## 2018-06-02 ASSESSMENT — PAIN DESCRIPTION - PAIN TYPE: TYPE: ACUTE PAIN

## 2018-06-03 LAB
BLOOD CULTURE, ROUTINE: NORMAL
CULTURE, BLOOD 2: NORMAL
GLUCOSE BLD-MCNC: 281 MG/DL (ref 60–115)
GLUCOSE BLD-MCNC: 313 MG/DL (ref 60–115)
GLUCOSE BLD-MCNC: 333 MG/DL (ref 60–115)
GLUCOSE BLD-MCNC: 371 MG/DL (ref 60–115)
INR BLD: 1.9
PERFORMED ON: ABNORMAL
PROTHROMBIN TIME: 20 SEC (ref 9.6–12.3)

## 2018-06-03 PROCEDURE — 2700000000 HC OXYGEN THERAPY PER DAY

## 2018-06-03 PROCEDURE — 6370000000 HC RX 637 (ALT 250 FOR IP): Performed by: INTERNAL MEDICINE

## 2018-06-03 PROCEDURE — 2580000003 HC RX 258: Performed by: INTERNAL MEDICINE

## 2018-06-03 PROCEDURE — 6360000002 HC RX W HCPCS: Performed by: INTERNAL MEDICINE

## 2018-06-03 PROCEDURE — 85610 PROTHROMBIN TIME: CPT

## 2018-06-03 PROCEDURE — 36415 COLL VENOUS BLD VENIPUNCTURE: CPT

## 2018-06-03 PROCEDURE — 94640 AIRWAY INHALATION TREATMENT: CPT

## 2018-06-03 PROCEDURE — 1210000000 HC MED SURG R&B

## 2018-06-03 RX ADMIN — INSULIN HUMAN 20 UNITS: 100 INJECTION, SOLUTION PARENTERAL at 17:54

## 2018-06-03 RX ADMIN — DEXAMETHASONE SODIUM PHOSPHATE 4 MG: 4 INJECTION, SOLUTION INTRAMUSCULAR; INTRAVENOUS at 02:06

## 2018-06-03 RX ADMIN — HYDROCODONE BITARTRATE AND ACETAMINOPHEN 1 TABLET: 5; 325 TABLET ORAL at 22:54

## 2018-06-03 RX ADMIN — ALBUTEROL SULFATE 2.5 MG: 2.5 SOLUTION RESPIRATORY (INHALATION) at 13:25

## 2018-06-03 RX ADMIN — INSULIN HUMAN 20 UNITS: 100 INJECTION, SOLUTION PARENTERAL at 10:55

## 2018-06-03 RX ADMIN — DOCUSATE SODIUM 100 MG: 100 CAPSULE, LIQUID FILLED ORAL at 10:53

## 2018-06-03 RX ADMIN — DOCUSATE SODIUM 100 MG: 100 CAPSULE, LIQUID FILLED ORAL at 22:53

## 2018-06-03 RX ADMIN — INSULIN HUMAN 60 UNITS: 100 INJECTION, SUSPENSION SUBCUTANEOUS at 10:54

## 2018-06-03 RX ADMIN — HYDROCODONE BITARTRATE AND ACETAMINOPHEN 1 TABLET: 5; 325 TABLET ORAL at 10:19

## 2018-06-03 RX ADMIN — HYDROCHLOROTHIAZIDE 25 MG: 25 TABLET ORAL at 10:52

## 2018-06-03 RX ADMIN — ALPRAZOLAM 0.25 MG: 0.25 TABLET ORAL at 18:21

## 2018-06-03 RX ADMIN — PROPRANOLOL HYDROCHLORIDE 60 MG: 60 CAPSULE, EXTENDED RELEASE ORAL at 10:51

## 2018-06-03 RX ADMIN — PROPRANOLOL HYDROCHLORIDE 60 MG: 60 CAPSULE, EXTENDED RELEASE ORAL at 22:53

## 2018-06-03 RX ADMIN — NORTRIPTYLINE HYDROCHLORIDE 25 MG: 25 CAPSULE ORAL at 22:54

## 2018-06-03 RX ADMIN — LEVETIRACETAM 250 MG: 250 TABLET, FILM COATED ORAL at 22:53

## 2018-06-03 RX ADMIN — Medication 10 ML: at 10:00

## 2018-06-03 RX ADMIN — LEVOFLOXACIN 750 MG: 5 INJECTION, SOLUTION INTRAVENOUS at 16:33

## 2018-06-03 RX ADMIN — POTASSIUM CHLORIDE 10 MEQ: 20 TABLET, EXTENDED RELEASE ORAL at 10:51

## 2018-06-03 RX ADMIN — DOXEPIN HYDROCHLORIDE 200 MG: 25 CAPSULE ORAL at 22:54

## 2018-06-03 RX ADMIN — DEXAMETHASONE SODIUM PHOSPHATE 4 MG: 4 INJECTION, SOLUTION INTRAMUSCULAR; INTRAVENOUS at 17:51

## 2018-06-03 RX ADMIN — ISOSORBIDE MONONITRATE 30 MG: 30 TABLET, EXTENDED RELEASE ORAL at 11:02

## 2018-06-03 RX ADMIN — PANTOPRAZOLE SODIUM 40 MG: 40 TABLET, DELAYED RELEASE ORAL at 06:09

## 2018-06-03 RX ADMIN — ALBUTEROL SULFATE 2.5 MG: 2.5 SOLUTION RESPIRATORY (INHALATION) at 08:09

## 2018-06-03 RX ADMIN — PRIMIDONE 50 MG: 50 TABLET ORAL at 22:54

## 2018-06-03 RX ADMIN — FAMOTIDINE 20 MG: 20 TABLET ORAL at 22:54

## 2018-06-03 RX ADMIN — INSULIN HUMAN 60 UNITS: 100 INJECTION, SUSPENSION SUBCUTANEOUS at 17:56

## 2018-06-03 RX ADMIN — DEXAMETHASONE SODIUM PHOSPHATE 4 MG: 4 INJECTION, SOLUTION INTRAMUSCULAR; INTRAVENOUS at 10:20

## 2018-06-03 RX ADMIN — PRAVASTATIN SODIUM 80 MG: 80 TABLET ORAL at 10:51

## 2018-06-03 RX ADMIN — LEVETIRACETAM 250 MG: 250 TABLET, FILM COATED ORAL at 10:50

## 2018-06-03 RX ADMIN — ALBUTEROL SULFATE 2.5 MG: 2.5 SOLUTION RESPIRATORY (INHALATION) at 19:50

## 2018-06-03 RX ADMIN — INSULIN HUMAN 20 UNITS: 100 INJECTION, SOLUTION PARENTERAL at 13:12

## 2018-06-03 RX ADMIN — LEVOTHYROXINE SODIUM 150 MCG: 150 TABLET ORAL at 06:09

## 2018-06-03 RX ADMIN — ALPRAZOLAM 0.25 MG: 0.25 TABLET ORAL at 10:52

## 2018-06-03 RX ADMIN — FAMOTIDINE 20 MG: 20 TABLET ORAL at 10:52

## 2018-06-03 ASSESSMENT — PAIN SCALES - GENERAL
PAINLEVEL_OUTOF10: 8
PAINLEVEL_OUTOF10: 0
PAINLEVEL_OUTOF10: 0
PAINLEVEL_OUTOF10: 9
PAINLEVEL_OUTOF10: 0

## 2018-06-03 ASSESSMENT — PAIN DESCRIPTION - PAIN TYPE: TYPE: ACUTE PAIN

## 2018-06-03 ASSESSMENT — PAIN DESCRIPTION - ORIENTATION: ORIENTATION: RIGHT;LEFT

## 2018-06-03 ASSESSMENT — PAIN DESCRIPTION - LOCATION: LOCATION: LEG

## 2018-06-04 ENCOUNTER — APPOINTMENT (OUTPATIENT)
Dept: GENERAL RADIOLOGY | Age: 73
DRG: 542 | End: 2018-06-04
Payer: MEDICARE

## 2018-06-04 LAB
ALBUMIN SERPL-MCNC: 3.4 G/DL (ref 3.9–4.9)
ALP BLD-CCNC: 141 U/L (ref 40–130)
ALT SERPL-CCNC: 16 U/L (ref 0–33)
ANION GAP SERPL CALCULATED.3IONS-SCNC: 13 MEQ/L (ref 7–13)
AST SERPL-CCNC: 14 U/L (ref 0–35)
BILIRUB SERPL-MCNC: 0.3 MG/DL (ref 0–1.2)
BUN BLDV-MCNC: 34 MG/DL (ref 8–23)
CALCIUM SERPL-MCNC: 8.5 MG/DL (ref 8.6–10.2)
CHLORIDE BLD-SCNC: 93 MEQ/L (ref 98–107)
CO2: 26 MEQ/L (ref 22–29)
CREAT SERPL-MCNC: 1.11 MG/DL (ref 0.5–0.9)
GFR AFRICAN AMERICAN: 58.4
GFR NON-AFRICAN AMERICAN: 48.3
GLOBULIN: 2.7 G/DL (ref 2.3–3.5)
GLUCOSE BLD-MCNC: 265 MG/DL (ref 60–115)
GLUCOSE BLD-MCNC: 303 MG/DL (ref 60–115)
GLUCOSE BLD-MCNC: 364 MG/DL (ref 74–109)
GLUCOSE BLD-MCNC: 373 MG/DL (ref 60–115)
GLUCOSE BLD-MCNC: 395 MG/DL (ref 60–115)
HCT VFR BLD CALC: 39 % (ref 37–47)
HEMOGLOBIN: 12.9 G/DL (ref 12–16)
INR BLD: 1.2
MCH RBC QN AUTO: 27.4 PG (ref 27–31.3)
MCHC RBC AUTO-ENTMCNC: 33 % (ref 33–37)
MCV RBC AUTO: 83.2 FL (ref 82–100)
PDW BLD-RTO: 16.9 % (ref 11.5–14.5)
PERFORMED ON: ABNORMAL
PLATELET # BLD: 301 K/UL (ref 130–400)
POTASSIUM SERPL-SCNC: 4 MEQ/L (ref 3.5–5.1)
PROTHROMBIN TIME: 13 SEC (ref 9.6–12.3)
RBC # BLD: 4.69 M/UL (ref 4.2–5.4)
SODIUM BLD-SCNC: 132 MEQ/L (ref 132–144)
TOTAL PROTEIN: 6.1 G/DL (ref 6.4–8.1)
WBC # BLD: 16.3 K/UL (ref 4.8–10.8)

## 2018-06-04 PROCEDURE — 36415 COLL VENOUS BLD VENIPUNCTURE: CPT

## 2018-06-04 PROCEDURE — 80053 COMPREHEN METABOLIC PANEL: CPT

## 2018-06-04 PROCEDURE — 1210000000 HC MED SURG R&B

## 2018-06-04 PROCEDURE — 6360000002 HC RX W HCPCS: Performed by: INTERNAL MEDICINE

## 2018-06-04 PROCEDURE — 85610 PROTHROMBIN TIME: CPT

## 2018-06-04 PROCEDURE — 94760 N-INVAS EAR/PLS OXIMETRY 1: CPT

## 2018-06-04 PROCEDURE — 94640 AIRWAY INHALATION TREATMENT: CPT

## 2018-06-04 PROCEDURE — 85027 COMPLETE CBC AUTOMATED: CPT

## 2018-06-04 PROCEDURE — 2700000000 HC OXYGEN THERAPY PER DAY

## 2018-06-04 PROCEDURE — 6360000002 HC RX W HCPCS: Performed by: HOSPITALIST

## 2018-06-04 PROCEDURE — 94664 DEMO&/EVAL PT USE INHALER: CPT

## 2018-06-04 PROCEDURE — 6370000000 HC RX 637 (ALT 250 FOR IP): Performed by: INTERNAL MEDICINE

## 2018-06-04 PROCEDURE — 2580000003 HC RX 258: Performed by: INTERNAL MEDICINE

## 2018-06-04 PROCEDURE — 71045 X-RAY EXAM CHEST 1 VIEW: CPT

## 2018-06-04 RX ORDER — ALBUTEROL SULFATE 2.5 MG/3ML
2.5 SOLUTION RESPIRATORY (INHALATION) EVERY 4 HOURS PRN
Status: DISCONTINUED | OUTPATIENT
Start: 2018-06-04 | End: 2018-06-05 | Stop reason: HOSPADM

## 2018-06-04 RX ORDER — LEVOFLOXACIN 500 MG/1
500 TABLET, FILM COATED ORAL DAILY
Qty: 5 TABLET | Refills: 0 | Status: SHIPPED | OUTPATIENT
Start: 2018-06-04 | End: 2018-06-09

## 2018-06-04 RX ORDER — LEVOFLOXACIN 500 MG/1
500 TABLET, FILM COATED ORAL DAILY
Status: DISCONTINUED | OUTPATIENT
Start: 2018-06-04 | End: 2018-06-05 | Stop reason: HOSPADM

## 2018-06-04 RX ORDER — METHADONE HYDROCHLORIDE 5 MG/1
5 TABLET ORAL EVERY 6 HOURS PRN
Status: DISCONTINUED | OUTPATIENT
Start: 2018-06-04 | End: 2018-06-04

## 2018-06-04 RX ORDER — MORPHINE SULFATE 20 MG/ML
50 SOLUTION ORAL
Status: DISCONTINUED | OUTPATIENT
Start: 2018-06-04 | End: 2018-06-04

## 2018-06-04 RX ADMIN — DEXAMETHASONE SODIUM PHOSPHATE 4 MG: 4 INJECTION, SOLUTION INTRAMUSCULAR; INTRAVENOUS at 13:07

## 2018-06-04 RX ADMIN — HYDROCODONE BITARTRATE AND ACETAMINOPHEN 1 TABLET: 5; 325 TABLET ORAL at 13:08

## 2018-06-04 RX ADMIN — LEVETIRACETAM 250 MG: 250 TABLET, FILM COATED ORAL at 13:08

## 2018-06-04 RX ADMIN — HYDROCODONE BITARTRATE AND ACETAMINOPHEN 1 TABLET: 5; 325 TABLET ORAL at 21:37

## 2018-06-04 RX ADMIN — FAMOTIDINE 20 MG: 20 TABLET ORAL at 13:09

## 2018-06-04 RX ADMIN — PROPRANOLOL HYDROCHLORIDE 60 MG: 60 CAPSULE, EXTENDED RELEASE ORAL at 13:07

## 2018-06-04 RX ADMIN — PRAVASTATIN SODIUM 80 MG: 80 TABLET ORAL at 10:00

## 2018-06-04 RX ADMIN — POTASSIUM CHLORIDE 10 MEQ: 20 TABLET, EXTENDED RELEASE ORAL at 09:59

## 2018-06-04 RX ADMIN — DOXEPIN HYDROCHLORIDE 200 MG: 25 CAPSULE ORAL at 21:35

## 2018-06-04 RX ADMIN — LEVETIRACETAM 250 MG: 250 TABLET, FILM COATED ORAL at 21:37

## 2018-06-04 RX ADMIN — LEVOTHYROXINE SODIUM 150 MCG: 150 TABLET ORAL at 06:30

## 2018-06-04 RX ADMIN — ISOSORBIDE MONONITRATE 30 MG: 30 TABLET, EXTENDED RELEASE ORAL at 09:58

## 2018-06-04 RX ADMIN — PROPRANOLOL HYDROCHLORIDE 60 MG: 60 CAPSULE, EXTENDED RELEASE ORAL at 21:36

## 2018-06-04 RX ADMIN — ALBUTEROL SULFATE 2.5 MG: 2.5 SOLUTION RESPIRATORY (INHALATION) at 20:06

## 2018-06-04 RX ADMIN — HYDROCHLOROTHIAZIDE 25 MG: 25 TABLET ORAL at 09:59

## 2018-06-04 RX ADMIN — DOCUSATE SODIUM 100 MG: 100 CAPSULE, LIQUID FILLED ORAL at 21:36

## 2018-06-04 RX ADMIN — LEVOFLOXACIN 750 MG: 5 INJECTION, SOLUTION INTRAVENOUS at 16:35

## 2018-06-04 RX ADMIN — NORTRIPTYLINE HYDROCHLORIDE 25 MG: 25 CAPSULE ORAL at 21:36

## 2018-06-04 RX ADMIN — DEXAMETHASONE SODIUM PHOSPHATE 4 MG: 4 INJECTION, SOLUTION INTRAMUSCULAR; INTRAVENOUS at 06:30

## 2018-06-04 RX ADMIN — PANTOPRAZOLE SODIUM 40 MG: 40 TABLET, DELAYED RELEASE ORAL at 06:30

## 2018-06-04 RX ADMIN — Medication 10 ML: at 16:39

## 2018-06-04 RX ADMIN — PRIMIDONE 50 MG: 50 TABLET ORAL at 21:36

## 2018-06-04 RX ADMIN — INSULIN HUMAN 20 UNITS: 100 INJECTION, SOLUTION PARENTERAL at 13:35

## 2018-06-04 RX ADMIN — Medication 10 ML: at 21:39

## 2018-06-04 RX ADMIN — INSULIN HUMAN 20 UNITS: 100 INJECTION, SOLUTION PARENTERAL at 10:23

## 2018-06-04 RX ADMIN — DOCUSATE SODIUM 100 MG: 100 CAPSULE, LIQUID FILLED ORAL at 13:08

## 2018-06-04 RX ADMIN — INSULIN HUMAN 20 UNITS: 100 INJECTION, SOLUTION PARENTERAL at 18:06

## 2018-06-04 RX ADMIN — INSULIN HUMAN 60 UNITS: 100 INJECTION, SUSPENSION SUBCUTANEOUS at 18:20

## 2018-06-04 RX ADMIN — FAMOTIDINE 20 MG: 20 TABLET ORAL at 21:37

## 2018-06-04 ASSESSMENT — ENCOUNTER SYMPTOMS
NAUSEA: 0
VOMITING: 0

## 2018-06-04 ASSESSMENT — PAIN SCALES - GENERAL
PAINLEVEL_OUTOF10: 8
PAINLEVEL_OUTOF10: 8

## 2018-06-04 ASSESSMENT — PAIN DESCRIPTION - PROGRESSION: CLINICAL_PROGRESSION: GRADUALLY IMPROVING

## 2018-06-05 VITALS
HEIGHT: 68 IN | OXYGEN SATURATION: 96 % | TEMPERATURE: 97.3 F | SYSTOLIC BLOOD PRESSURE: 134 MMHG | DIASTOLIC BLOOD PRESSURE: 57 MMHG | BODY MASS INDEX: 36.68 KG/M2 | HEART RATE: 67 BPM | RESPIRATION RATE: 23 BRPM | WEIGHT: 242 LBS

## 2018-06-05 LAB
EKG ATRIAL RATE: 102 BPM
EKG ATRIAL RATE: 68 BPM
EKG ATRIAL RATE: 80 BPM
EKG P AXIS: 29 DEGREES
EKG P AXIS: 34 DEGREES
EKG P AXIS: 38 DEGREES
EKG P-R INTERVAL: 164 MS
EKG P-R INTERVAL: 176 MS
EKG P-R INTERVAL: 182 MS
EKG Q-T INTERVAL: 338 MS
EKG Q-T INTERVAL: 390 MS
EKG Q-T INTERVAL: 396 MS
EKG QRS DURATION: 82 MS
EKG QRS DURATION: 84 MS
EKG QRS DURATION: 88 MS
EKG QTC CALCULATION (BAZETT): 414 MS
EKG QTC CALCULATION (BAZETT): 440 MS
EKG QTC CALCULATION (BAZETT): 456 MS
EKG R AXIS: 11 DEGREES
EKG R AXIS: 20 DEGREES
EKG R AXIS: 22 DEGREES
EKG T AXIS: 39 DEGREES
EKG T AXIS: 59 DEGREES
EKG T AXIS: 81 DEGREES
EKG VENTRICULAR RATE: 102 BPM
EKG VENTRICULAR RATE: 68 BPM
EKG VENTRICULAR RATE: 80 BPM
GLUCOSE BLD-MCNC: 155 MG/DL (ref 60–115)
GLUCOSE BLD-MCNC: 163 MG/DL (ref 60–115)
INR BLD: 1.2
PERFORMED ON: ABNORMAL
PERFORMED ON: ABNORMAL
PROTHROMBIN TIME: 12.3 SEC (ref 9.6–12.3)

## 2018-06-05 PROCEDURE — 2580000003 HC RX 258: Performed by: INTERNAL MEDICINE

## 2018-06-05 PROCEDURE — 93005 ELECTROCARDIOGRAM TRACING: CPT

## 2018-06-05 PROCEDURE — 2700000000 HC OXYGEN THERAPY PER DAY

## 2018-06-05 PROCEDURE — 6370000000 HC RX 637 (ALT 250 FOR IP): Performed by: INTERNAL MEDICINE

## 2018-06-05 PROCEDURE — 36415 COLL VENOUS BLD VENIPUNCTURE: CPT

## 2018-06-05 PROCEDURE — 85610 PROTHROMBIN TIME: CPT

## 2018-06-05 RX ADMIN — LEVOTHYROXINE SODIUM 150 MCG: 150 TABLET ORAL at 06:54

## 2018-06-05 RX ADMIN — ISOSORBIDE MONONITRATE 30 MG: 30 TABLET, EXTENDED RELEASE ORAL at 08:26

## 2018-06-05 RX ADMIN — PRAVASTATIN SODIUM 80 MG: 80 TABLET ORAL at 08:26

## 2018-06-05 RX ADMIN — Medication 10 ML: at 08:27

## 2018-06-05 RX ADMIN — PROPRANOLOL HYDROCHLORIDE 60 MG: 60 CAPSULE, EXTENDED RELEASE ORAL at 08:26

## 2018-06-05 RX ADMIN — INSULIN HUMAN 60 UNITS: 100 INJECTION, SUSPENSION SUBCUTANEOUS at 08:29

## 2018-06-05 RX ADMIN — PANTOPRAZOLE SODIUM 40 MG: 40 TABLET, DELAYED RELEASE ORAL at 06:54

## 2018-06-05 RX ADMIN — POTASSIUM CHLORIDE 10 MEQ: 20 TABLET, EXTENDED RELEASE ORAL at 08:27

## 2018-06-05 RX ADMIN — HYDROCHLOROTHIAZIDE 25 MG: 25 TABLET ORAL at 08:26

## 2018-06-05 RX ADMIN — LEVETIRACETAM 250 MG: 250 TABLET, FILM COATED ORAL at 08:27

## 2018-06-05 RX ADMIN — DOCUSATE SODIUM 100 MG: 100 CAPSULE, LIQUID FILLED ORAL at 08:27

## 2018-06-05 RX ADMIN — INSULIN HUMAN 20 UNITS: 100 INJECTION, SOLUTION PARENTERAL at 08:30

## 2018-06-05 RX ADMIN — LEVOFLOXACIN 500 MG: 500 TABLET, FILM COATED ORAL at 08:27

## 2018-06-05 RX ADMIN — FAMOTIDINE 20 MG: 20 TABLET ORAL at 08:27

## 2018-06-05 RX ADMIN — ALPRAZOLAM 0.25 MG: 0.25 TABLET ORAL at 11:15

## 2018-06-05 ASSESSMENT — PAIN DESCRIPTION - PROGRESSION
CLINICAL_PROGRESSION: GRADUALLY IMPROVING

## 2018-06-05 ASSESSMENT — PAIN SCALES - GENERAL: PAINLEVEL_OUTOF10: 0

## 2018-06-05 ASSESSMENT — ENCOUNTER SYMPTOMS
NAUSEA: 0
SHORTNESS OF BREATH: 0
VOMITING: 0

## 2018-06-08 PROCEDURE — 93010 ELECTROCARDIOGRAM REPORT: CPT | Performed by: INTERNAL MEDICINE
